# Patient Record
Sex: FEMALE | Race: OTHER | NOT HISPANIC OR LATINO | Employment: FULL TIME | URBAN - METROPOLITAN AREA
[De-identification: names, ages, dates, MRNs, and addresses within clinical notes are randomized per-mention and may not be internally consistent; named-entity substitution may affect disease eponyms.]

---

## 2017-01-23 ENCOUNTER — APPOINTMENT (OUTPATIENT)
Dept: RADIOLOGY | Facility: MEDICAL CENTER | Age: 57
End: 2017-01-23
Attending: SPECIALIST
Payer: COMMERCIAL

## 2017-02-09 ENCOUNTER — HOSPITAL ENCOUNTER (OUTPATIENT)
Dept: RADIOLOGY | Facility: MEDICAL CENTER | Age: 57
End: 2017-02-09
Attending: SPECIALIST
Payer: COMMERCIAL

## 2017-02-09 DIAGNOSIS — D25.9 UTERINE LEIOMYOMA, UNSPECIFIED LOCATION: ICD-10-CM

## 2017-02-09 PROCEDURE — 76830 TRANSVAGINAL US NON-OB: CPT

## 2017-04-05 ENCOUNTER — HOSPITAL ENCOUNTER (OUTPATIENT)
Dept: LAB | Facility: MEDICAL CENTER | Age: 57
End: 2017-04-05
Attending: FAMILY MEDICINE
Payer: COMMERCIAL

## 2017-04-05 DIAGNOSIS — E55.9 VITAMIN D INSUFFICIENCY: ICD-10-CM

## 2017-04-05 DIAGNOSIS — E03.9 HYPOTHYROIDISM, UNSPECIFIED TYPE: ICD-10-CM

## 2017-04-05 DIAGNOSIS — E11.8 TYPE 2 DIABETES MELLITUS WITH COMPLICATION, UNSPECIFIED LONG TERM INSULIN USE STATUS: ICD-10-CM

## 2017-04-05 DIAGNOSIS — E78.5 DYSLIPIDEMIA: ICD-10-CM

## 2017-04-05 LAB
25(OH)D3 SERPL-MCNC: 40 NG/ML (ref 30–100)
ALBUMIN SERPL BCP-MCNC: 4.1 G/DL (ref 3.2–4.9)
ALBUMIN/GLOB SERPL: 1.4 G/DL
ALP SERPL-CCNC: 60 U/L (ref 30–99)
ALT SERPL-CCNC: 23 U/L (ref 2–50)
ANION GAP SERPL CALC-SCNC: 6 MMOL/L (ref 0–11.9)
AST SERPL-CCNC: 20 U/L (ref 12–45)
BASOPHILS # BLD AUTO: 0.5 % (ref 0–1.8)
BASOPHILS # BLD: 0.03 K/UL (ref 0–0.12)
BILIRUB SERPL-MCNC: 0.5 MG/DL (ref 0.1–1.5)
BUN SERPL-MCNC: 12 MG/DL (ref 8–22)
CALCIUM SERPL-MCNC: 9.1 MG/DL (ref 8.5–10.5)
CHLORIDE SERPL-SCNC: 101 MMOL/L (ref 96–112)
CHOLEST SERPL-MCNC: 145 MG/DL (ref 100–199)
CO2 SERPL-SCNC: 27 MMOL/L (ref 20–33)
CREAT SERPL-MCNC: 0.57 MG/DL (ref 0.5–1.4)
EOSINOPHIL # BLD AUTO: 0.12 K/UL (ref 0–0.51)
EOSINOPHIL NFR BLD: 2.1 % (ref 0–6.9)
ERYTHROCYTE [DISTWIDTH] IN BLOOD BY AUTOMATED COUNT: 39.2 FL (ref 35.9–50)
EST. AVERAGE GLUCOSE BLD GHB EST-MCNC: 154 MG/DL
GFR SERPL CREATININE-BSD FRML MDRD: >60 ML/MIN/1.73 M 2
GLOBULIN SER CALC-MCNC: 3 G/DL (ref 1.9–3.5)
GLUCOSE SERPL-MCNC: 136 MG/DL (ref 65–99)
HBA1C MFR BLD: 7 % (ref 0–5.6)
HCT VFR BLD AUTO: 37.9 % (ref 37–47)
HDLC SERPL-MCNC: 38 MG/DL
HGB BLD-MCNC: 12.6 G/DL (ref 12–16)
IMM GRANULOCYTES # BLD AUTO: 0.01 K/UL (ref 0–0.11)
IMM GRANULOCYTES NFR BLD AUTO: 0.2 % (ref 0–0.9)
LDLC SERPL CALC-MCNC: 74 MG/DL
LYMPHOCYTES # BLD AUTO: 2.36 K/UL (ref 1–4.8)
LYMPHOCYTES NFR BLD: 40.5 % (ref 22–41)
MCH RBC QN AUTO: 28.4 PG (ref 27–33)
MCHC RBC AUTO-ENTMCNC: 33.2 G/DL (ref 33.6–35)
MCV RBC AUTO: 85.6 FL (ref 81.4–97.8)
MONOCYTES # BLD AUTO: 0.36 K/UL (ref 0–0.85)
MONOCYTES NFR BLD AUTO: 6.2 % (ref 0–13.4)
NEUTROPHILS # BLD AUTO: 2.94 K/UL (ref 2–7.15)
NEUTROPHILS NFR BLD: 50.5 % (ref 44–72)
NRBC # BLD AUTO: 0 K/UL
NRBC BLD AUTO-RTO: 0 /100 WBC
PLATELET # BLD AUTO: 331 K/UL (ref 164–446)
PMV BLD AUTO: 9.9 FL (ref 9–12.9)
POTASSIUM SERPL-SCNC: 4.1 MMOL/L (ref 3.6–5.5)
PROT SERPL-MCNC: 7.1 G/DL (ref 6–8.2)
RBC # BLD AUTO: 4.43 M/UL (ref 4.2–5.4)
SODIUM SERPL-SCNC: 134 MMOL/L (ref 135–145)
T4 FREE SERPL-MCNC: 1.29 NG/DL (ref 0.53–1.43)
TRIGL SERPL-MCNC: 164 MG/DL (ref 0–149)
TSH SERPL DL<=0.005 MIU/L-ACNC: 3.37 UIU/ML (ref 0.3–3.7)
WBC # BLD AUTO: 5.8 K/UL (ref 4.8–10.8)

## 2017-04-05 PROCEDURE — 82570 ASSAY OF URINE CREATININE: CPT

## 2017-04-05 PROCEDURE — 82043 UR ALBUMIN QUANTITATIVE: CPT

## 2017-04-05 PROCEDURE — 83036 HEMOGLOBIN GLYCOSYLATED A1C: CPT

## 2017-04-05 PROCEDURE — 84443 ASSAY THYROID STIM HORMONE: CPT

## 2017-04-05 PROCEDURE — 36415 COLL VENOUS BLD VENIPUNCTURE: CPT

## 2017-04-05 PROCEDURE — 84439 ASSAY OF FREE THYROXINE: CPT

## 2017-04-05 PROCEDURE — 80061 LIPID PANEL: CPT

## 2017-04-05 PROCEDURE — 82306 VITAMIN D 25 HYDROXY: CPT

## 2017-04-05 PROCEDURE — 80053 COMPREHEN METABOLIC PANEL: CPT

## 2017-04-05 PROCEDURE — 85025 COMPLETE CBC W/AUTO DIFF WBC: CPT

## 2017-04-06 LAB
CREAT UR-MCNC: 27.1 MG/DL
MICROALBUMIN UR-MCNC: <0.7 MG/DL
MICROALBUMIN/CREAT UR: NORMAL MG/G (ref 0–30)

## 2017-04-07 ENCOUNTER — OFFICE VISIT (OUTPATIENT)
Dept: MEDICAL GROUP | Age: 57
End: 2017-04-07
Payer: COMMERCIAL

## 2017-04-07 VITALS
DIASTOLIC BLOOD PRESSURE: 72 MMHG | SYSTOLIC BLOOD PRESSURE: 134 MMHG | OXYGEN SATURATION: 97 % | WEIGHT: 139 LBS | HEART RATE: 83 BPM | BODY MASS INDEX: 25.58 KG/M2 | HEIGHT: 62 IN | TEMPERATURE: 97.1 F

## 2017-04-07 DIAGNOSIS — Z23 NEED FOR HEPATITIS B VACCINATION: ICD-10-CM

## 2017-04-07 DIAGNOSIS — E78.5 DYSLIPIDEMIA: ICD-10-CM

## 2017-04-07 DIAGNOSIS — E11.9 TYPE 2 DIABETES MELLITUS WITHOUT COMPLICATION, WITHOUT LONG-TERM CURRENT USE OF INSULIN (HCC): ICD-10-CM

## 2017-04-07 DIAGNOSIS — E78.1 HYPERTRIGLYCERIDEMIA: ICD-10-CM

## 2017-04-07 DIAGNOSIS — E03.9 HYPOTHYROIDISM, UNSPECIFIED TYPE: ICD-10-CM

## 2017-04-07 DIAGNOSIS — R20.2 TINGLING IN EXTREMITIES: ICD-10-CM

## 2017-04-07 DIAGNOSIS — R21 FACIAL RASH: ICD-10-CM

## 2017-04-07 PROCEDURE — 99214 OFFICE O/P EST MOD 30 MIN: CPT | Mod: 25 | Performed by: FAMILY MEDICINE

## 2017-04-07 PROCEDURE — 90746 HEPB VACCINE 3 DOSE ADULT IM: CPT | Performed by: FAMILY MEDICINE

## 2017-04-07 PROCEDURE — 90471 IMMUNIZATION ADMIN: CPT | Performed by: FAMILY MEDICINE

## 2017-04-07 RX ORDER — LANCETS 30 GAUGE
EACH MISCELLANEOUS
Qty: 100 EACH | Refills: 3 | Status: SHIPPED
Start: 2017-04-07 | End: 2017-06-14 | Stop reason: SDUPTHER

## 2017-04-07 ASSESSMENT — PAIN SCALES - GENERAL: PAINLEVEL: NO PAIN

## 2017-04-07 ASSESSMENT — PATIENT HEALTH QUESTIONNAIRE - PHQ9: CLINICAL INTERPRETATION OF PHQ2 SCORE: 0

## 2017-04-07 NOTE — MR AVS SNAPSHOT
"        Shona Sharpl   2017 10:40 AM   Office Visit   MRN: [id number]    Department:  [address] [place] [address]   Dept Phone:  [phone]    Description:  Female : 1960   Provider:  Laisha Tovar M.D.           Reason for Visit     Diabetes DM follow up/Lab review      Allergies as of 2017     Allergen Noted Reactions    Pcn [Penicillins] 2010         You were diagnosed with     Type 2 diabetes mellitus without complication, without long-term current use of insulin (CMS-HCC)   [3081744]       Hypothyroidism, unspecified type   [7024166]       Dyslipidemia   [813239]       Need for hepatitis B vaccination   [347336]         Vital Signs     Blood Pressure Pulse Temperature Height Weight Body Mass Index    134/72 mmHg 83 36.2 °C (97.1 °F) 1.568 m (5' 1.75\") 63.05 kg (139 lb) 25.64 kg/m2    Oxygen Saturation Smoking Status                97% Never Smoker           Basic Information     Date Of Birth Sex Race Ethnicity Preferred Language    1960 Female Other Non- English      Your appointments     2017  4:20 PM   Established Patient with Laisha Tovar M.D.   [address] [address] [phone]           You will be receiving a confirmation call a few days before your appointment from our automated call confirmation system.              Problem List              ICD-10-CM Priority Class Noted - Resolved    Fibroid uterus D25.9   2010 - Present    Iron defic anemia NEC    2010 - Present    Hypothyroidism E03.9   2010 - Present    Abdominal pain, chronic, right lower quadrant R10.31, G89.29   9/15/2011 - Present    GERD (gastroesophageal reflux disease) K21.9   2012 - Present    Suprapubic discomfort R10.2   2012 - Present    Dyslipidemia E78.5   2013 - Present    Diabetes mellitus, type 2 (CMS-HCC) E11.9   2014 - Present    Postmenopausal Z78.0   2014 - Present   " Vitamin D insufficiency E55.9   12/29/2015 - Present    Hypertriglyceridemia E78.1   6/16/2016 - Present    Visit for screening mammogram Z12.31   10/10/2016 - Present    Cervical cancer screening Z12.4   10/10/2016 - Present      Health Maintenance        Date Due Completion Dates    IMM HEP B VACCINE (1 of 3 - Primary Series) 1960 ---    RETINAL SCREENING 8/9/1977 ---    DIABETES MONOFILAMENT / LE EXAM 6/10/2016 6/10/2015, 6/10/2015 (Done), 6/23/2014 (Done)    Override on 6/10/2015: Done    Override on 6/23/2014: Done    PAP SMEAR 6/23/2017 6/23/2014, 7/20/2012, 9/17/2010    A1C SCREENING 10/5/2017 4/5/2017, 9/26/2016, 6/10/2016, 12/11/2015, 6/8/2015, 12/3/2014, 4/22/2014, 8/23/2013, 5/9/2013, 6/8/2012, 9/2/2011, 4/13/2011    MAMMOGRAM 10/20/2017 10/20/2016, 7/24/2014, 5/17/2013, 5/13/2013, 5/13/2013, 9/7/2011 (Done), 8/19/2009, 8/19/2009, 6/16/2008, 6/16/2008, 4/19/2007    Override on 9/7/2011: Done (per patient)    FASTING LIPID PROFILE 4/5/2018 4/5/2017, 9/26/2016, 6/10/2016, 12/11/2015, 6/8/2015, 12/3/2014, 4/22/2014, 8/23/2013, 5/9/2013, 6/8/2012, 9/2/2011, 9/23/2010    URINE ACR / MICROALBUMIN 4/5/2018 4/5/2017, 6/10/2016, 6/8/2015, 4/22/2014, 5/9/2013, 9/2/2011    SERUM CREATININE 4/5/2018 4/5/2017, 9/26/2016, 6/10/2016, 12/11/2015, 6/8/2015, 12/3/2014, 4/22/2014, 8/23/2013, 5/9/2013, 6/8/2012, 9/2/2011, 9/23/2010    COLONOSCOPY 4/23/2020 4/23/2010    IMM DTaP/Tdap/Td Vaccine (2 - Td) 6/23/2024 6/23/2014            Current Immunizations     13-VALENT PCV PREVNAR 6/10/2015    Hepatitis B Vaccine Recombivax (Adol/Adult)  Incomplete    Influenza TIV (IM) 9/30/2015    Influenza Vaccine Quad Inj (Pf) 9/15/2016  9:48 AM    Influenza Vaccine Quad Inj (Preserved) 11/11/2014    Pneumococcal polysaccharide vaccine (PPSV-23) 8/28/2013    Tdap Vaccine 6/23/2014      Below and/or attached are the medications your provider expects you to take. Review all of your home medications and newly ordered medications with  your provider and/or pharmacist. Follow medication instructions as directed by your provider and/or pharmacist. Please keep your medication list with you and share with your provider. Update the information when medications are discontinued, doses are changed, or new medications (including over-the-counter products) are added; and carry medication information at all times in the event of emergency situations     Allergies:  PCN - (reactions not documented)               Medications  Valid as of: April 07, 2017 - 11:32 AM    Generic Name Brand Name Tablet Size Instructions for use    Calcium-Vitamin D   Take 1 Tab by mouth every 48 hours as needed.        Cholecalciferol (Tab) cholecalciferol 1000 UNIT Take 2,000 Units by mouth every day.        Diclofenac Sodium (Gel) Diclofenac Sodium 1 % Apply 4 g to affected area(s) 3 times a day as needed.        Glucose Blood (Strip) glucose blood  100 Strips by Other route every day.        Lancets (Misc) Lancets  Lancets order: Lancets for Accucheck Compact meter. Sig: use daily and prn ssx high or low sugar. #100 RF x 3        Levothyroxine Sodium (Tab) SYNTHROID 75 MCG TAKE 1 TABLET BY MOUTH EVERY DAY        MetFORMIN HCl (TABLET SR 24 HR) GLUCOPHAGE  MG Take 1 Tab by mouth every day.        Multiple Vitamins-Minerals   Take 1 Tab by mouth every day.        Omega-3 Fatty Acids   Take  by mouth.        Triamcinolone Acetonide (Ointment) KENALOG 0.1 % Apply 1 Application to affected area(s) 2 times a day.        .                 Medicines prescribed today were sent to:     [facility][id number] - [place] - [address]    [address] [address]    Phone: [phone] Fax: [phone]    Open 24 Hours?: No      Medication refill instructions:       If your prescription bottle indicates you have medication refills left, it is not necessary to call your provider’s office. Please contact your pharmacy and they will refill your medication.    If your  prescription bottle indicates you do not have any refills left, you may request refills at any time through one of the following ways: The online [employer] system (except Urgent Care), by calling your provider’s office, or by asking your pharmacy to contact your provider’s office with a refill request. Medication refills are processed only during regular business hours and may not be available until the next business day. Your provider may request additional information or to have a follow-up visit with you prior to refilling your medication.   *Please Note: Medication refills are assigned a new Rx number when refilled electronically. Your pharmacy may indicate that no refills were authorized even though a new prescription for the same medication is available at the pharmacy. Please request the medicine by name with the pharmacy before contacting your provider for a refill.        Your To Do List     Future Labs/Procedures Complete By Expires    COMP METABOLIC PANEL  As directed 10/5/2017    HEMOGLOBIN A1C  As directed 4/7/2018    LIPID PROFILE  As directed 10/5/2017         [employer] Access Code: Activation code not generated  Current [employer] Status: Active

## 2017-04-07 NOTE — Clinical Note
[facility]  Laisha Tovar M.D.  [address]  [place] [address]  Fax: [phone]   Authorization for Release/Disclosure of   Protected Health Information   Name: ADELFO KAUFMAN : 1960 SSN: [id number]   Address: [address] [place] [address]   [place] [address] Phone:    [phone] (home)    I authorize the entity listed below to release/disclose the PHI below to:   [facility]/Laisha Tovar M.D. and Laisha Tovar M.D.   Provider or Entity Name:     Address   City, State, Zip   Phone:      Fax:     Reason for request: continuity of care   Information to be released:    [  ] LAST COLONOSCOPY,  including any PATH REPORT and follow-up  [  ] LAST FIT/COLOGUARD RESULT [  ] LAST DEXA  [  ] LAST MAMMOGRAM  [  ] LAST PAP  [  ] LAST LABS [  ] RETINA EXAM REPORT  [  ] IMMUNIZATION RECORDS  [  ] Release all info      [  ] Check here and initial the line next to each item to release ALL health information INCLUDING  _____ Care and treatment for drug and / or alcohol abuse  _____ HIV testing, infection status, or AIDS  _____ Genetic Testing    DATES OF SERVICE OR TIME PERIOD TO BE DISCLOSED: _____________  I understand and acknowledge that:  * This Authorization may be revoked at any time by you in writing, except if your health information has already been used or disclosed.  * Your health information that will be used or disclosed as a result of you signing this authorization could be re-disclosed by the recipient. If this occurs, your re-disclosed health information may no longer be protected by State or Federal laws.  * You may refuse to sign this Authorization. Your refusal will not affect your ability to obtain treatment.  * This Authorization becomes effective upon signing and will  on (date) __________.      If no date is indicated, this Authorization will  one (1) year from the signature date.    Name: Adelfo Kaufman    Signature:   Date:     2017       PLEASE FAX  REQUESTED RECORDS BACK TO: [phone]

## 2017-04-07 NOTE — PROGRESS NOTES
SUBJECTIVE:      Chief Complaint   Patient presents with   • Diabetes     DM follow up/Lab review       HPI:     Shona Garcia is a 56 y.o. female here for follow up of diabetes and labs.     Diabetes- not on medication therapy currently. Last visit started metformin- tried for 2 weeks, felt she had some breathing issues on medication, thus stopped taking medication and symptoms improved. She is taking a bitter melon supplement.   In the past she was on lisinopril 2.5 mg for two weeks but had dry cough.     Dyslipidemia/Hypertriglyceridemia- takes fish oil on occasion. Otherwise not on medication therapy, has been diet controlled.     Occasionally tingling in finger tips and toes, but is not persistent and seems to pass. Uncertain if she is feeling anxious when symptoms occur.     Has noticed a rash in the middle of her forehead near her eyebrow for past 2-3 years. States it tends to be better with cold water. Not dry, itchy or flaky.   States he friend has lupus and she was concerned. No bleeding or scabbing.     Hypothyroidism- labs stable on levothyroxine 75 mcg daily. Has tolerated well.       Diabetes Health Maintenance  On aspirin: has stomach issues on asa.    Foot exam: Due  Eye exam: done about 6 months ago  Vaccinations:    Influenza: 2015; Pneumonia: PPSV 23- 2013; Td/Adacel: 2014  Educated about routine foot exam: recommend routine check       Ref. Range 4/5/2017 07:58   WBC Latest Ref Range: 4.8-10.8 K/uL 5.8   RBC Latest Ref Range: 4.20-5.40 M/uL 4.43   Hemoglobin Latest Ref Range: 12.0-16.0 g/dL 12.6   Hematocrit Latest Ref Range: 37.0-47.0 % 37.9   MCV Latest Ref Range: 81.4-97.8 fL 85.6   MCH Latest Ref Range: 27.0-33.0 pg 28.4   MCHC Latest Ref Range: 33.6-35.0 g/dL 33.2 (L)   RDW Latest Ref Range: 35.9-50.0 fL 39.2   Platelet Count Latest Ref Range: 164-446 K/uL 331   MPV Latest Ref Range: 9.0-12.9 fL 9.9   Neutrophils-Polys Latest Ref Range: 44.00-72.00 % 50.50   Neutrophils  (Absolute) Latest Ref Range: 2.00-7.15 K/uL 2.94   Lymphocytes Latest Ref Range: 22.00-41.00 % 40.50   Lymphs (Absolute) Latest Ref Range: 1.00-4.80 K/uL 2.36   Monocytes Latest Ref Range: 0.00-13.40 % 6.20   Monos (Absolute) Latest Ref Range: 0.00-0.85 K/uL 0.36   Eosinophils Latest Ref Range: 0.00-6.90 % 2.10   Eos (Absolute) Latest Ref Range: 0.00-0.51 K/uL 0.12   Basophils Latest Ref Range: 0.00-1.80 % 0.50   Baso (Absolute) Latest Ref Range: 0.00-0.12 K/uL 0.03   Immature Granulocytes Latest Ref Range: 0.00-0.90 % 0.20   Immature Granulocytes (abs) Latest Ref Range: 0.00-0.11 K/uL 0.01   Nucleated RBC Latest Units: /100 WBC 0.00   NRBC (Absolute) Latest Units: K/uL 0.00   Sodium Latest Ref Range: 135-145 mmol/L 134 (L)   Potassium Latest Ref Range: 3.6-5.5 mmol/L 4.1   Chloride Latest Ref Range:  mmol/L 101   Co2 Latest Ref Range: 20-33 mmol/L 27   Anion Gap Latest Ref Range: 0.0-11.9  6.0   Glucose Latest Ref Range: 65-99 mg/dL 136 (H)   Bun Latest Ref Range: 8-22 mg/dL 12   Creatinine Latest Ref Range: 0.50-1.40 mg/dL 0.57   GFR If  Latest Ref Range: >60 mL/min/1.73 m 2 >60   GFR If Non  Latest Ref Range: >60 mL/min/1.73 m 2 >60   Calcium Latest Ref Range: 8.5-10.5 mg/dL 9.1   AST(SGOT) Latest Ref Range: 12-45 U/L 20   ALT(SGPT) Latest Ref Range: 2-50 U/L 23   Alkaline Phosphatase Latest Ref Range: 30-99 U/L 60   Total Bilirubin Latest Ref Range: 0.1-1.5 mg/dL 0.5   Albumin Latest Ref Range: 3.2-4.9 g/dL 4.1   Total Protein Latest Ref Range: 6.0-8.2 g/dL 7.1   Globulin Latest Ref Range: 1.9-3.5 g/dL 3.0   A-G Ratio Latest Units: g/dL 1.4   Glycohemoglobin Latest Ref Range: 0.0-5.6 % 7.0 (H)   Estim. Avg Glu Latest Units: mg/dL 154   Cholesterol,Tot Latest Ref Range: 100-199 mg/dL 145   Triglycerides Latest Ref Range: 0-149 mg/dL 164 (H)   HDL Latest Ref Range: >=40 mg/dL 38 (A)   LDL Latest Ref Range: <100 mg/dL 74   Micro Alb Creat Ratio Latest Ref Range: 0-30 mg/g see  "below   Creatinine, Urine Latest Units: mg/dL 27.10   Microalbumin, Urine Random Latest Units: mg/dL <0.7   25-Hydroxy   Vitamin D 25 Latest Ref Range:  ng/mL 40   TSH Latest Ref Range: 0.300-3.700 uIU/mL 3.370   Free T-4 Latest Ref Range: 0.53-1.43 ng/dL 1.29       ROS:  Denies any recent fevers or chills. No nausea or vomiting. No diarrhea. No chest pains or shortness of breath. No lower extremity edema.    Current Outpatient Prescriptions on File Prior to Visit   Medication Sig Dispense Refill   • levothyroxine (SYNTHROID) 75 MCG Tab TAKE 1 TABLET BY MOUTH EVERY DAY 30 Tab 3   • vitamin D (CHOLECALCIFEROL) 1000 UNIT TABS Take 2,000 Units by mouth every day.     • Diclofenac Sodium 1 % GEL Apply 4 g to affected area(s) 3 times a day as needed. 1 Tube 2   • triamcinolone acetonide (KENALOG) 0.1 % OINT Apply 1 Application to affected area(s) 2 times a day. 1 Tube 1   • CALCIUM-VITAMIN D PO Take 1 Tab by mouth every 48 hours as needed.     • Multiple Vitamins-Minerals (WOMENS MULTI PO) Take 1 Tab by mouth every day.     • metformin ER (GLUCOPHAGE XR) 500 MG TABLET SR 24 HR Take 1 Tab by mouth every day. 90 Tab 0     No current facility-administered medications on file prior to visit.       Allergies   Allergen Reactions   • Pcn [Penicillins]        Past Medical History   Diagnosis Date   • Uterine fibroid 1986   • Headache(784.0)    • Anemia    • Blood transfusion 1986     Brother's blood   • Allergy    • Measles    • Varicella    • Heart murmur    • GERD (gastroesophageal reflux disease)      Off and on with certain foods.   • Thyroid disease    • Pyelonephritis 2002   • Diabetes mellitus type II 4/18/2011   • Dyslipidemia 8/28/2013   • Uterine fibroid      has seen gynecology   • S/P colonoscopy 2010     negative-repeat 10 years       OBJECTIVE:   /72 mmHg  Pulse 83  Temp(Src) 36.2 °C (97.1 °F)  Ht 1.568 m (5' 1.75\")  Wt 63.05 kg (139 lb)  BMI 25.64 kg/m2  SpO2 97%  General: Well-developed " well-nourished female, no acute distress  HEENT: mid forehead and between eyebrows with large slightly thickened/raised area, erythematous patch.   Neck: supple, no lymphadenopathy- cervical or supraclavicular, no thyromegaly  Cardiovascular: regular rate and rhythm, no murmurs, gallops, rubs  Lungs: clear to auscultation bilaterally, no wheezes, crackles, or rhonchi  Abdomen: +bowel sounds, soft, nontender, nondistended, no rebound, no guarding, no hepatosplenomegaly  Extremities: no cyanosis, clubbing, edema  Skin: Warm and dry  Psych: appropriate mood and affect  Diabetic foot exam: 2+ pedal pulses, no lesions noted, sensation intact with 10 out of 10 monofilament test.      ASSESSMENT/PLAN:    56 year old female.     1. Type 2 diabetes mellitus without complication, without long-term current use of insulin (HCC)- slightly increased hga1c compared to prior.   -Reviewed recommendations. Recommend diabetic diet, routine exercise. Discussed consideration of new medication therapy, did not tolerate metformin. She will continue supplement therapy. Monitor labs, repeat in 3 months.   glucose blood (ACCU-CHEK COMPACT PLUS) strip    COMP METABOLIC PANEL    HEMOGLOBIN A1C    Hep B Adult 20+    Diabetic Monofilament Lower Extremity Exam   2. Dyslipidemia- diet controlled. Stable. Monitor.   COMP METABOLIC PANEL    LIPID PROFILE   3. Hypertriglyceridemia- stable. Discussed dietary changes, continue omega 3 supplement.    4. Tingling in extremities- intermittent. Unclear if possibly associated with anxiety or start of peripheral neuropathy. No significant findings on foot exam today. Monitor at this time.      5. Facial rash -unclear etiology. Slightly hive-like appearance. Appears benign at this time.   -Recommend moisturize adequately. Continue to monitor. Consider dermatology referral as needed in future.     6. Hypothyroidism, unspecified type - stable, continue current medications.      7. Need for hepatitis B  vaccination  Hep B vaccine       Return in about 3 months (around 7/7/2017).

## 2017-04-17 RX ORDER — LEVOTHYROXINE SODIUM 0.07 MG/1
TABLET ORAL
Refills: 3 | OUTPATIENT
Start: 2017-04-17

## 2017-04-20 ENCOUNTER — PATIENT MESSAGE (OUTPATIENT)
Dept: MEDICAL GROUP | Age: 57
End: 2017-04-20

## 2017-04-20 NOTE — TELEPHONE ENCOUNTER
Prescription Question   4/20/2017 9:17 AM Reply   To: [address]     From: Shona Garcia     Created: 4/20/2017 9:17 AM       *-*-*This message has not been handled.*-*-*     Good Morning Dr. Tovar!    Please send a prescription order to [facility] at the [facility], for my medicine LEVOTHYROXINE 75 MCG when you have a chance. Pharmacy said refill requires authorization. I appreciate your help a lot!    Thank you,   Shona

## 2017-04-21 RX ORDER — LEVOTHYROXINE SODIUM 0.07 MG/1
TABLET ORAL
Qty: 30 TAB | Refills: 3 | Status: SHIPPED | OUTPATIENT
Start: 2017-04-21 | End: 2017-08-11 | Stop reason: SDUPTHER

## 2017-05-30 ENCOUNTER — TELEPHONE (OUTPATIENT)
Dept: MEDICAL GROUP | Age: 57
End: 2017-05-30

## 2017-05-30 NOTE — TELEPHONE ENCOUNTER
1. Caller Name:   [id number] - [place] - [address]  [address] [address]  Phone: [phone] Fax: [phone]        Pharmacy would like to verify how many times a day the pt is testing.

## 2017-05-31 NOTE — TELEPHONE ENCOUNTER
Phone Number Called:   [id number] - [place], [place] - [address]  [address]  [place] [address]  Phone: [phone] Fax: [phone]    Message: Pharmacy notified.    Left Message for patient to call back: no

## 2017-06-14 DIAGNOSIS — E11.9 TYPE 2 DIABETES MELLITUS WITHOUT COMPLICATION, WITHOUT LONG-TERM CURRENT USE OF INSULIN (HCC): ICD-10-CM

## 2017-06-14 RX ORDER — LANCETS 30 GAUGE
EACH MISCELLANEOUS
Qty: 100 EACH | Refills: 3 | Status: SHIPPED | OUTPATIENT
Start: 2017-06-14 | End: 2017-07-13 | Stop reason: SDUPTHER

## 2017-06-14 NOTE — TELEPHONE ENCOUNTER
----- Message from Your Healthcare Team sent at 6/14/2017  3:56 PM PDT -----  Regarding: Prescription Question  Contact: [phone]  Dear Dr. Tovar,    The prescription you ordered for my diabetic supplies is not approved by my insurance.    Per the [facility] provider [facility] need a new prescription & authorization direct from my doctor.   I do my blood test 2 to 3 times daily so I need a 'One Touch' machine, 90 strips, lancets for one month. They said they can do mail order for 90days if the doctor authorizes.      Can you please email the above supplies prescription direct to ‘[facility]?       Thank you  Luz

## 2017-07-07 ENCOUNTER — HOSPITAL ENCOUNTER (OUTPATIENT)
Dept: LAB | Facility: MEDICAL CENTER | Age: 57
End: 2017-07-07
Attending: FAMILY MEDICINE
Payer: COMMERCIAL

## 2017-07-07 DIAGNOSIS — E78.5 DYSLIPIDEMIA: ICD-10-CM

## 2017-07-07 DIAGNOSIS — E11.9 TYPE 2 DIABETES MELLITUS WITHOUT COMPLICATION, WITHOUT LONG-TERM CURRENT USE OF INSULIN (HCC): ICD-10-CM

## 2017-07-07 LAB
ALBUMIN SERPL BCP-MCNC: 4 G/DL (ref 3.2–4.9)
ALBUMIN/GLOB SERPL: 1.3 G/DL
ALP SERPL-CCNC: 56 U/L (ref 30–99)
ALT SERPL-CCNC: 20 U/L (ref 2–50)
ANION GAP SERPL CALC-SCNC: 8 MMOL/L (ref 0–11.9)
AST SERPL-CCNC: 18 U/L (ref 12–45)
BILIRUB SERPL-MCNC: 0.5 MG/DL (ref 0.1–1.5)
BUN SERPL-MCNC: 10 MG/DL (ref 8–22)
CALCIUM SERPL-MCNC: 9.2 MG/DL (ref 8.5–10.5)
CHLORIDE SERPL-SCNC: 101 MMOL/L (ref 96–112)
CHOLEST SERPL-MCNC: 155 MG/DL (ref 100–199)
CO2 SERPL-SCNC: 25 MMOL/L (ref 20–33)
CREAT SERPL-MCNC: 0.61 MG/DL (ref 0.5–1.4)
EST. AVERAGE GLUCOSE BLD GHB EST-MCNC: 160 MG/DL
GFR SERPL CREATININE-BSD FRML MDRD: >60 ML/MIN/1.73 M 2
GLOBULIN SER CALC-MCNC: 3 G/DL (ref 1.9–3.5)
GLUCOSE SERPL-MCNC: 135 MG/DL (ref 65–99)
HBA1C MFR BLD: 7.2 % (ref 0–5.6)
HDLC SERPL-MCNC: 39 MG/DL
LDLC SERPL CALC-MCNC: 62 MG/DL
POTASSIUM SERPL-SCNC: 4.4 MMOL/L (ref 3.6–5.5)
PROT SERPL-MCNC: 7 G/DL (ref 6–8.2)
SODIUM SERPL-SCNC: 134 MMOL/L (ref 135–145)
TRIGL SERPL-MCNC: 270 MG/DL (ref 0–149)

## 2017-07-07 PROCEDURE — 80053 COMPREHEN METABOLIC PANEL: CPT

## 2017-07-07 PROCEDURE — 36415 COLL VENOUS BLD VENIPUNCTURE: CPT

## 2017-07-07 PROCEDURE — 80061 LIPID PANEL: CPT

## 2017-07-07 PROCEDURE — 83036 HEMOGLOBIN GLYCOSYLATED A1C: CPT

## 2017-07-12 ENCOUNTER — TELEPHONE (OUTPATIENT)
Dept: MEDICAL GROUP | Age: 57
End: 2017-07-12

## 2017-07-12 NOTE — TELEPHONE ENCOUNTER
ESTABLISHED PATIENT PRE-VISIT PLANNING     Note: Patient will not be contacted if there is no indication to call.     1.  Reviewed notes from the last few office visits within the medical group: Yes    2.  If any orders were placed at last visit or intended to be done for this visit (i.e. 6 mos follow-up), do we have Results/Consult Notes?        •  Labs - Labs ordered, completed and results are in chart.       •  Imaging - Imaging was not ordered at last office visit.       •  Referrals - No referrals were ordered at last office visit.    3. Is this appointment scheduled as a Hospital Follow-Up? No    4.  Immunizations were updated in Epic using [employer]?: Epic matches [employer]       •  [employer] Recommendations: FLU, HEPATITIS A , HEPATITIS B, MMR , TD and VARICELLA (Chicken Pox)     5.  Patient is due for the following Health Maintenance Topics:   Health Maintenance Due   Topic Date Due   • RETINAL SCREENING  08/09/1977   • IMM HEP B VACCINE (2 of 3 - Primary Series) 05/05/2017   • PAP SMEAR  06/23/2017           6.  Patient was NOT informed to arrive 15 min prior to their scheduled appointment and bring in their medication bottles.

## 2017-07-13 ENCOUNTER — OFFICE VISIT (OUTPATIENT)
Dept: MEDICAL GROUP | Age: 57
End: 2017-07-13
Payer: COMMERCIAL

## 2017-07-13 VITALS
WEIGHT: 136.8 LBS | BODY MASS INDEX: 25.17 KG/M2 | HEIGHT: 62 IN | HEART RATE: 78 BPM | SYSTOLIC BLOOD PRESSURE: 120 MMHG | DIASTOLIC BLOOD PRESSURE: 86 MMHG | TEMPERATURE: 97.2 F | OXYGEN SATURATION: 98 %

## 2017-07-13 DIAGNOSIS — E78.5 DYSLIPIDEMIA: ICD-10-CM

## 2017-07-13 DIAGNOSIS — E11.9 TYPE 2 DIABETES MELLITUS WITHOUT COMPLICATION, WITHOUT LONG-TERM CURRENT USE OF INSULIN (HCC): ICD-10-CM

## 2017-07-13 DIAGNOSIS — R21 RASH AND NONSPECIFIC SKIN ERUPTION: ICD-10-CM

## 2017-07-13 DIAGNOSIS — Z23 NEED FOR VACCINATION: ICD-10-CM

## 2017-07-13 DIAGNOSIS — R51.9 NONINTRACTABLE EPISODIC HEADACHE, UNSPECIFIED HEADACHE TYPE: ICD-10-CM

## 2017-07-13 DIAGNOSIS — E78.1 HYPERTRIGLYCERIDEMIA: ICD-10-CM

## 2017-07-13 PROCEDURE — 90471 IMMUNIZATION ADMIN: CPT | Performed by: FAMILY MEDICINE

## 2017-07-13 PROCEDURE — 99214 OFFICE O/P EST MOD 30 MIN: CPT | Mod: 25 | Performed by: FAMILY MEDICINE

## 2017-07-13 PROCEDURE — 90746 HEPB VACCINE 3 DOSE ADULT IM: CPT | Performed by: FAMILY MEDICINE

## 2017-07-13 RX ORDER — BLOOD-GLUCOSE METER
EACH MISCELLANEOUS
Refills: 3 | COMMUNITY
Start: 2017-07-03 | End: 2018-07-19

## 2017-07-13 NOTE — MR AVS SNAPSHOT
"Shona Garcia   2017 4:20 PM   Office Visit   MRN: [id number]    Department:  [address] [place] [address]   Dept Phone:  [phone]    Description:  Female : 1960   Provider:  Laisha Tovar M.D.           Reason for Visit     Diabetes Mellitus     Results Blood work done on 17    Headache last 2-3 weeks she has had a headache 2-3 times per week.      Allergies as of 2017     Allergen Noted Reactions    Pcn [Penicillins] 2010         You were diagnosed with     Need for vaccination   [052160]       Rash and nonspecific skin eruption   [499309]       Type 2 diabetes mellitus with complication, unspecified long term insulin use status (CMS-HCC)   [1315224]       Type 2 diabetes mellitus without complication, without long-term current use of insulin (CMS-HCC)   [3551296]         Vital Signs     Blood Pressure Pulse Temperature Height Weight Body Mass Index    120/86 mmHg 78 36.2 °C (97.2 °F) 1.568 m (5' 1.75\") 62.052 kg (136 lb 12.8 oz) 25.24 kg/m2    Oxygen Saturation Smoking Status                98% Never Smoker           Basic Information     Date Of Birth Sex Race Ethnicity Preferred Language    1960 Female Other Non- English      Your appointments     Oct 19, 2017  4:20 PM   Established Patient with Laisha Tovar M.D.   [address] [id number] [phone]           You will be receiving a confirmation call a few days before your appointment from our automated call confirmation system.              Problem List              ICD-10-CM Priority Class Noted - Resolved    Fibroid uterus D25.9   2010 - Present    Iron defic anemia NEC    2010 - Present    Hypothyroidism E03.9   2010 - Present    Abdominal pain, chronic, right lower quadrant R10.31, G89.29   9/15/2011 - Present    GERD (gastroesophageal reflux disease) K21.9   2012 - Present    Suprapubic discomfort R10.2   " 8/16/2012 - Present    Dyslipidemia E78.5   8/28/2013 - Present    Diabetes mellitus, type 2 (CMS-HCC) E11.9   4/21/2014 - Present    Postmenopausal Z78.0   6/23/2014 - Present    Vitamin D insufficiency E55.9   12/29/2015 - Present    Hypertriglyceridemia E78.1   6/16/2016 - Present    Visit for screening mammogram Z12.31   10/10/2016 - Present    Cervical cancer screening Z12.4   10/10/2016 - Present      Health Maintenance        Date Due Completion Dates    RETINAL SCREENING 8/9/1977 ---    IMM HEP B VACCINE (2 of 3 - Primary Series) 5/5/2017 4/7/2017    PAP SMEAR 6/23/2017 6/23/2014, 7/20/2012, 9/17/2010    IMM INFLUENZA (1) 9/1/2017 9/15/2016, 9/30/2015, 11/11/2014    MAMMOGRAM 10/20/2017 10/20/2016, 7/24/2014, 5/17/2013, 9/7/2011 (Done), 8/19/2009, 8/19/2009, 6/16/2008, 6/16/2008, 4/19/2007    Override on 9/7/2011: Done (per patient)    A1C SCREENING 1/7/2018 7/7/2017, 4/5/2017, 9/26/2016, 6/10/2016, 12/11/2015, 6/8/2015, 12/3/2014, 4/22/2014, 8/23/2013, 5/9/2013, 6/8/2012, 9/2/2011, 4/13/2011    URINE ACR / MICROALBUMIN 4/5/2018 4/5/2017, 6/10/2016, 6/8/2015, 4/22/2014, 5/9/2013, 9/2/2011    DIABETES MONOFILAMENT / LE EXAM 4/8/2018 4/8/2017, 6/10/2015, 6/10/2015 (Done), 6/23/2014 (Done)    Override on 6/10/2015: Done    Override on 6/23/2014: Done    FASTING LIPID PROFILE 7/7/2018 7/7/2017, 4/5/2017, 9/26/2016, 6/10/2016, 12/11/2015, 6/8/2015, 12/3/2014, 4/22/2014, 8/23/2013, 5/9/2013, 6/8/2012, 9/2/2011, 9/23/2010    SERUM CREATININE 7/7/2018 7/7/2017, 4/5/2017, 9/26/2016, 6/10/2016, 12/11/2015, 6/8/2015, 12/3/2014, 4/22/2014, 8/23/2013, 5/9/2013, 6/8/2012, 9/2/2011, 9/23/2010    COLONOSCOPY 4/23/2020 4/23/2010    IMM DTaP/Tdap/Td Vaccine (2 - Td) 6/23/2024 6/23/2014            Current Immunizations     13-VALENT PCV PREVNAR 6/10/2015    Hepatitis B Vaccine Recombivax (Adol/Adult)  Incomplete, 4/7/2017    Influenza TIV (IM) 9/30/2015    Influenza Vaccine Quad Inj (Pf) 9/15/2016  9:48 AM    Influenza Vaccine  Quad Inj (Preserved) 11/11/2014    Pneumococcal polysaccharide vaccine (PPSV-23) 8/28/2013    Tdap Vaccine 6/23/2014      Below and/or attached are the medications your provider expects you to take. Review all of your home medications and newly ordered medications with your provider and/or pharmacist. Follow medication instructions as directed by your provider and/or pharmacist. Please keep your medication list with you and share with your provider. Update the information when medications are discontinued, doses are changed, or new medications (including over-the-counter products) are added; and carry medication information at all times in the event of emergency situations     Allergies:  PCN - (reactions not documented)               Medications  Valid as of: July 13, 2017 -  5:24 PM    Generic Name Brand Name Tablet Size Instructions for use    Blood Glucose Monitoring Suppl (Misc) Blood Glucose Monitoring Suppl SUPPLIES Sig: use daily and prn ssx high or low sugar #100 RF x 3; 1. The OneTouch Verio Flex® meter- one time; 2. OneTouch Verio® test strips - # 90, RF #3; 3. OneTouch UltraSoft® lancets  -# 90, RF 3        Blood Glucose Monitoring Suppl (Kit) ONETOUCH VERIO W/DEVICE USE TO TEST HIGH OR LOW SUGAR DAILY AS NEEDED        Calcium-Vitamin D   Take 1 Tab by mouth every 48 hours as needed.        Cholecalciferol (Tab) cholecalciferol 1000 UNIT Take 2,000 Units by mouth every day.        Diclofenac Sodium (Gel) Diclofenac Sodium 1 % Apply 4 g to affected area(s) 3 times a day as needed.        Glucose Blood (Strip) glucose blood  100 Strips by Other route every day.        Lancets (Misc) Lancets  Lancets order: Lancets for Accucheck Compact meter. Sig: use daily and prn ssx high or low sugar. #100 RF x 3        Levothyroxine Sodium (Tab) SYNTHROID 75 MCG TAKE 1 TABLET BY MOUTH EVERY DAY        MetFORMIN HCl (TABLET SR 24 HR) GLUCOPHAGE  MG Take 1 Tab by mouth every day.        MetFORMIN HCl (Tab)  GLUCOPHAGE 500 MG Take 1 Tab by mouth every day.        Multiple Vitamins-Minerals   Take 1 Tab by mouth every day.        Omega-3 Fatty Acids   Take  by mouth.        Triamcinolone Acetonide (Ointment) KENALOG 0.1 % Apply 1 Application to affected area(s) 2 times a day.        .                 Medicines prescribed today were sent to:     [facility][id number] - [place], [place] - [address]    [address] [place] [address]    Phone: [phone] Fax: [phone]    Open 24 Hours?: No    [facility] - [place], [place] - [address] [facility] [address] [facility]    [address] [facility] [address]    Phone: [phone] Fax: [phone]    Open 24 Hours?: No      Medication refill instructions:       If your prescription bottle indicates you have medication refills left, it is not necessary to call your provider’s office. Please contact your pharmacy and they will refill your medication.    If your prescription bottle indicates you do not have any refills left, you may request refills at any time through one of the following ways: The online [email or web address] system (except Urgent Care), by calling your provider’s office, or by asking your pharmacy to contact your provider’s office with a refill request. Medication refills are processed only during regular business hours and may not be available until the next business day. Your provider may request additional information or to have a follow-up visit with you prior to refilling your medication.   *Please Note: Medication refills are assigned a new Rx number when refilled electronically. Your pharmacy may indicate that no refills were authorized even though a new prescription for the same medication is available at the pharmacy. Please request the medicine by name with the pharmacy before contacting your provider for a refill.        Your To Do List     Future Labs/Procedures Complete By Expires    ANTI-NUCLEAR ANTIBODY SERUM  As directed 7/13/2018      Referral      A referral request has been sent to our patient care coordination department. Please allow 3-5 business days for us to process this request and contact you either by phone or mail. If you do not hear from us by the 5th business day, please call us at [phone].           [employer] Access Code: Activation code not generated  Current [employer] Status: Active

## 2017-07-13 NOTE — PROGRESS NOTES
SUBJECTIVE:        Chief Complaint   Patient presents with   • Diabetes Mellitus   • Results     Blood work done on 07/07/17   • Headache     last 2-3 weeks she has had a headache 2-3 times per week.       HPI:     Shona Garcia is a 56 y.o. female here for follow up of diabetes and labs.     Diabetes- was eating more fried food at work lately. Slightly increase hgba1c. Previously felt she may have had some breathing issues after starting metformin xr, which seemed to stop after she discontinued. She would like to retry therapy as her brother is on the medication.  She has taken bitter melon supplements.   In the past she was on lisinopril 2.5 mg for two weeks but had dry cough.     Dyslipidemia/Hypertriglyceridemia- takes fish oil on occasion a few times a week. She is otherwise not on medication therapy, has been diet controlled.   She would like to start a probiotic.     Getting some headaches about 2-3 times a week lately. Sometimes on side and sometimes all over. Gets headaches with sun exposure. Similar to before. Sometimes advil helps. Achy. Sometimes sensitive to light.  Stress is worse. Saturdays- more so with headaches, forgets to drink water.    Rash in mid forehead area between eye brow region. She has had the rash for a while- years Would like to have lab work testing for possible lupus. No unusual joint pain symptoms. May have occasional right lower leg numbness if sitting for a while, but that improves. Occasional tingling in toes.     Will see gynecology- has fibroid. Will plan to have pap completed then.       Diabetes Health Maintenance  On aspirin: has stomach issues on asa.    Foot exam: 4/2017  Eye exam: mild cataracts- last week had exam.   Vaccinations:    Influenza: 2015; Pneumonia: PPSV 23- 2013; Td/Adacel: 2014  Educated about routine foot exam: recommend routine check         Ref. Range 7/7/2017 08:13   Sodium Latest Ref Range: 135-145 mmol/L 134 (L)   Potassium Latest Ref  Range: 3.6-5.5 mmol/L 4.4   Chloride Latest Ref Range:  mmol/L 101   Co2 Latest Ref Range: 20-33 mmol/L 25   Anion Gap Latest Ref Range: 0.0-11.9  8.0   Glucose Latest Ref Range: 65-99 mg/dL 135 (H)   Bun Latest Ref Range: 8-22 mg/dL 10   Creatinine Latest Ref Range: 0.50-1.40 mg/dL 0.61   GFR If  Latest Ref Range: >60 mL/min/1.73 m 2 >60   GFR If Non  Latest Ref Range: >60 mL/min/1.73 m 2 >60   Calcium Latest Ref Range: 8.5-10.5 mg/dL 9.2   AST(SGOT) Latest Ref Range: 12-45 U/L 18   ALT(SGPT) Latest Ref Range: 2-50 U/L 20   Alkaline Phosphatase Latest Ref Range: 30-99 U/L 56   Total Bilirubin Latest Ref Range: 0.1-1.5 mg/dL 0.5   Albumin Latest Ref Range: 3.2-4.9 g/dL 4.0   Total Protein Latest Ref Range: 6.0-8.2 g/dL 7.0   Globulin Latest Ref Range: 1.9-3.5 g/dL 3.0   A-G Ratio Latest Units: g/dL 1.3   Glycohemoglobin Latest Ref Range: 0.0-5.6 % 7.2 (H)   Estim. Avg Glu Latest Units: mg/dL 160   Cholesterol,Tot Latest Ref Range: 100-199 mg/dL 155   Triglycerides Latest Ref Range: 0-149 mg/dL 270 (H)   HDL Latest Ref Range: >=40 mg/dL 39 (A)   LDL Latest Ref Range: <100 mg/dL 62       ROS:  Denies any recent fevers or chills. No nausea or vomiting. No diarrhea. No chest pains or shortness of breath. No lower extremity edema.    Current Outpatient Prescriptions on File Prior to Visit   Medication Sig Dispense Refill   • glucose blood (ACCU-CHEK COMPACT PLUS) strip 100 Strips by Other route every day. 100 Strip 3   • levothyroxine (SYNTHROID) 75 MCG Tab TAKE 1 TABLET BY MOUTH EVERY DAY 30 Tab 3   • Omega-3 Fatty Acids (OMEGA 3 PO) Take  by mouth.     • vitamin D (CHOLECALCIFEROL) 1000 UNIT TABS Take 2,000 Units by mouth every day.     • triamcinolone acetonide (KENALOG) 0.1 % OINT Apply 1 Application to affected area(s) 2 times a day. 1 Tube 1   • Multiple Vitamins-Minerals (WOMENS MULTI PO) Take 1 Tab by mouth every day.     • metformin ER (GLUCOPHAGE XR) 500 MG TABLET SR 24 HR  "Take 1 Tab by mouth every day. (Patient not taking: Reported on 7/13/2017) 90 Tab 0   • Diclofenac Sodium 1 % GEL Apply 4 g to affected area(s) 3 times a day as needed. (Patient not taking: Reported on 7/13/2017) 1 Tube 2   • CALCIUM-VITAMIN D PO Take 1 Tab by mouth every 48 hours as needed.       No current facility-administered medications on file prior to visit.       Allergies   Allergen Reactions   • Pcn [Penicillins]        Patient Active Problem List    Diagnosis Date Noted   • Type 2 diabetes mellitus without complication, without long-term current use of insulin ([facility]) 07/14/2017   • Nonintractable episodic headache 07/14/2017   • Visit for screening mammogram 10/10/2016   • Cervical cancer screening 10/10/2016   • Hypertriglyceridemia 06/16/2016   • Vitamin D insufficiency 12/29/2015   • Postmenopausal 06/23/2014   • Diabetes mellitus, type 2 (CMS-HCC) 04/21/2014   • Dyslipidemia 08/28/2013   • Suprapubic discomfort 08/16/2012   • GERD (gastroesophageal reflux disease) 06/05/2012   • Abdominal pain, chronic, right lower quadrant 09/15/2011   • Fibroid uterus 09/14/2010   • Iron defic anemia NEC 09/14/2010   • Hypothyroidism 09/14/2010       Past Medical History   Diagnosis Date   • Uterine fibroid 1986   • Headache    • Anemia    • Blood transfusion 1986     Brother's blood   • Allergy    • Measles    • Varicella    • Heart murmur    • GERD (gastroesophageal reflux disease)      Off and on with certain foods.   • Thyroid disease    • Pyelonephritis 2002   • Diabetes mellitus type II 4/18/2011   • Dyslipidemia 8/28/2013   • Uterine fibroid      has seen gynecology   • S/P colonoscopy 2010     negative-repeat 10 years             OBJECTIVE:   /86 mmHg  Pulse 78  Temp(Src) 36.2 °C (97.2 °F)  Ht 1.568 m (5' 1.75\")  Wt 62.052 kg (136 lb 12.8 oz)  BMI 25.24 kg/m2  SpO2 98%  General: Well-developed well-nourished female, no acute distress  Neck: supple, no lymphadenopathy- cervical or supraclavicular, " no thyromegaly  Cardiovascular: regular rate and rhythm, no murmurs, gallops, rubs  Lungs: clear to auscultation bilaterally, no wheezes, crackles, or rhonchi  Abdomen: +bowel sounds, soft, nontender, nondistended, no rebound, no guarding, no hepatosplenomegaly  Extremities: no cyanosis, clubbing, edema  Skin: Warm and dry. Mid forehead- glabella region with slightly raised patch of erythema   Psych: appropriate mood and affect      ASSESSMENT/PLAN:    56 y.o.female    1. Type 2 diabetes mellitus without complication, without long-term current use of insulin (HCC) - slightly elevated hgba1c. Will give trial of metformin- 500 mg daily. Monitor glucose daily.  metformin (GLUCOPHAGE) 500 MG Tab    Blood Glucose Monitoring Suppl SUPPLIES Misc    Lancets Misc   2. Dyslipidemia - uncontrolled- recommend diet changes, increase fish oils to 2 times a day as tolerated. Monitor. Routine exercise.     3. Hypertriglyceridemia - as above.     4. Nonintractable episodic headache, unspecified headache type- appears multifactorial- stress related and associated with lack of fluid intake.   -Recommend stress modification and adequate fluid intake. OTC medication as needed. Monitor.     5. Rash and nonspecific skin eruption- unclear etiology of rash. Obtain lab. Refer to dermatology otherwise, may consider biopsy of area.   ANTI-NUCLEAR ANTIBODY SERUM    REFERRAL TO DERMATOLOGY   6. Need for vaccination  Hepatitis B Vaccine Adult IM   Patient to obtain pap with gynecology.     Return in about 3 months (around 10/13/2017).    This medical record contains text that has been entered with the assistance of computer voice recognition and dictation software.  Therefore, it may contain unintended errors in text, spelling, punctuation, or grammar.

## 2017-07-14 PROBLEM — R51.9 NONINTRACTABLE EPISODIC HEADACHE: Status: ACTIVE | Noted: 2017-07-14

## 2017-07-14 PROBLEM — E11.9 TYPE 2 DIABETES MELLITUS WITHOUT COMPLICATION, WITHOUT LONG-TERM CURRENT USE OF INSULIN (HCC): Status: ACTIVE | Noted: 2017-07-14

## 2017-07-14 RX ORDER — LANCETS 30 GAUGE
EACH MISCELLANEOUS
Qty: 100 EACH | Refills: 3 | Status: SHIPPED | OUTPATIENT
Start: 2017-07-14 | End: 2017-12-08 | Stop reason: SDUPTHER

## 2017-08-14 RX ORDER — LEVOTHYROXINE SODIUM 0.07 MG/1
TABLET ORAL
Qty: 30 TAB | Refills: 6 | Status: SHIPPED | OUTPATIENT
Start: 2017-08-14 | End: 2018-03-14 | Stop reason: SDUPTHER

## 2017-09-20 ENCOUNTER — APPOINTMENT (OUTPATIENT)
Dept: SOCIAL WORK | Facility: CLINIC | Age: 57
End: 2017-09-20

## 2017-09-20 PROCEDURE — 90686 IIV4 VACC NO PRSV 0.5 ML IM: CPT | Performed by: REGISTERED NURSE

## 2017-10-16 ENCOUNTER — HOSPITAL ENCOUNTER (OUTPATIENT)
Dept: LAB | Facility: MEDICAL CENTER | Age: 57
End: 2017-10-16
Attending: FAMILY MEDICINE
Payer: COMMERCIAL

## 2017-10-16 DIAGNOSIS — E11.8 TYPE 2 DIABETES MELLITUS WITH COMPLICATION, UNSPECIFIED LONG TERM INSULIN USE STATUS: ICD-10-CM

## 2017-10-16 DIAGNOSIS — E78.5 DYSLIPIDEMIA: ICD-10-CM

## 2017-10-16 DIAGNOSIS — R21 RASH AND NONSPECIFIC SKIN ERUPTION: ICD-10-CM

## 2017-10-16 LAB
ALBUMIN SERPL BCP-MCNC: 3.9 G/DL (ref 3.2–4.9)
ALBUMIN/GLOB SERPL: 1.3 G/DL
ALP SERPL-CCNC: 54 U/L (ref 30–99)
ALT SERPL-CCNC: 17 U/L (ref 2–50)
ANION GAP SERPL CALC-SCNC: 7 MMOL/L (ref 0–11.9)
AST SERPL-CCNC: 18 U/L (ref 12–45)
BILIRUB SERPL-MCNC: 0.5 MG/DL (ref 0.1–1.5)
BUN SERPL-MCNC: 8 MG/DL (ref 8–22)
CALCIUM SERPL-MCNC: 9.3 MG/DL (ref 8.5–10.5)
CHLORIDE SERPL-SCNC: 100 MMOL/L (ref 96–112)
CHOLEST SERPL-MCNC: 143 MG/DL (ref 100–199)
CO2 SERPL-SCNC: 25 MMOL/L (ref 20–33)
CREAT SERPL-MCNC: 0.5 MG/DL (ref 0.5–1.4)
EST. AVERAGE GLUCOSE BLD GHB EST-MCNC: 154 MG/DL
GFR SERPL CREATININE-BSD FRML MDRD: >60 ML/MIN/1.73 M 2
GLOBULIN SER CALC-MCNC: 2.9 G/DL (ref 1.9–3.5)
GLUCOSE SERPL-MCNC: 110 MG/DL (ref 65–99)
HBA1C MFR BLD: 7 % (ref 0–5.6)
HDLC SERPL-MCNC: 41 MG/DL
LDLC SERPL CALC-MCNC: 71 MG/DL
POTASSIUM SERPL-SCNC: 4.1 MMOL/L (ref 3.6–5.5)
PROT SERPL-MCNC: 6.8 G/DL (ref 6–8.2)
SODIUM SERPL-SCNC: 132 MMOL/L (ref 135–145)
TRIGL SERPL-MCNC: 154 MG/DL (ref 0–149)

## 2017-10-16 PROCEDURE — 36415 COLL VENOUS BLD VENIPUNCTURE: CPT

## 2017-10-16 PROCEDURE — 83036 HEMOGLOBIN GLYCOSYLATED A1C: CPT

## 2017-10-16 PROCEDURE — 80053 COMPREHEN METABOLIC PANEL: CPT

## 2017-10-16 PROCEDURE — 86038 ANTINUCLEAR ANTIBODIES: CPT

## 2017-10-16 PROCEDURE — 80061 LIPID PANEL: CPT

## 2017-10-17 LAB — NUCLEAR IGG SER QL IA: NORMAL

## 2017-10-18 ENCOUNTER — TELEPHONE (OUTPATIENT)
Dept: MEDICAL GROUP | Age: 57
End: 2017-10-18

## 2017-10-19 ENCOUNTER — OFFICE VISIT (OUTPATIENT)
Dept: MEDICAL GROUP | Age: 57
End: 2017-10-19
Payer: COMMERCIAL

## 2017-10-19 VITALS
DIASTOLIC BLOOD PRESSURE: 90 MMHG | WEIGHT: 132.6 LBS | SYSTOLIC BLOOD PRESSURE: 140 MMHG | OXYGEN SATURATION: 98 % | BODY MASS INDEX: 24.4 KG/M2 | TEMPERATURE: 97.8 F | HEIGHT: 62 IN | HEART RATE: 61 BPM

## 2017-10-19 DIAGNOSIS — E11.8 TYPE 2 DIABETES MELLITUS WITH COMPLICATION, UNSPECIFIED LONG TERM INSULIN USE STATUS: ICD-10-CM

## 2017-10-19 DIAGNOSIS — R03.0 ELEVATED BP WITHOUT DIAGNOSIS OF HYPERTENSION: ICD-10-CM

## 2017-10-19 PROCEDURE — 99214 OFFICE O/P EST MOD 30 MIN: CPT | Performed by: FAMILY MEDICINE

## 2017-10-19 RX ORDER — MECLIZINE HYDROCHLORIDE 25 MG/1
25 TABLET ORAL 3 TIMES DAILY PRN
Qty: 30 TAB | Refills: 0 | Status: SHIPPED | OUTPATIENT
Start: 2017-10-19 | End: 2017-10-26 | Stop reason: SDUPTHER

## 2017-10-19 NOTE — PROGRESS NOTES
SUBJECTIVE:      Chief Complaint   Patient presents with   • Dizziness       HPI:     Shona Garcia is a 57 y.o. female here for symptoms of dizziness. She is here with her  today.   States she was trying to get up was dizzy since this morning. Notices symptoms when she turns her head. Notices significant spinning sensation and feels she may fall over. No headache currently. No eye pain or visual changes otherwise.   No chest pain or shortness of breath.  No nausea/vomiting.   Has had some neck pain, using a lower pillow and is better today.   Sometimes feels her head is numb in the morning.   BP was 155/88s this morning. Last 2 weeks with not enough sleep, has been stressed. Has had about 4 hours of sleep at night. BP has been normal in the past.   Next week has eye doctor appointment- possible glaucoma.   She has diabetes- no significant low glucose levels.       ROS:  Denies any recent fevers or chills. No nausea or vomiting. No diarrhea. No chest pains or shortness of breath. No lower extremity edema.    Current Outpatient Prescriptions on File Prior to Visit   Medication Sig Dispense Refill   • levothyroxine (SYNTHROID) 75 MCG Tab TAKE 1 TABLET BY MOUTH EVERY DAY 30 Tab 6   • Blood Glucose Monitoring Suppl SUPPLIES Misc Sig: use daily and prn ssx high or low sugar two times a day. OneTouch Verio test strips  # 90, RF #3; 1 Each 3   • Lancets Misc OneTouch UltraSoft  lancets # 90, RF 3 Sig: use daily and prn ssx high or low sugar two times a day. #100 RF x 3 100 Each 3   • Blood Glucose Monitoring Suppl (ONETOUCH VERIO) W/DEVICE Kit USE TO TEST HIGH OR LOW SUGAR DAILY AS NEEDED  3   • metformin (GLUCOPHAGE) 500 MG Tab Take 1 Tab by mouth every day. 30 Tab 3   • glucose blood (ACCU-CHEK COMPACT PLUS) strip 100 Strips by Other route every day. 100 Strip 3   • Omega-3 Fatty Acids (OMEGA 3 PO) Take  by mouth.     • vitamin D (CHOLECALCIFEROL) 1000 UNIT TABS Take 2,000 Units by mouth every day.      • triamcinolone acetonide (KENALOG) 0.1 % OINT Apply 1 Application to affected area(s) 2 times a day. 1 Tube 1   • Multiple Vitamins-Minerals (WOMENS MULTI PO) Take 1 Tab by mouth every day.     • metformin ER (GLUCOPHAGE XR) 500 MG TABLET SR 24 HR Take 1 Tab by mouth every day. (Patient not taking: Reported on 7/13/2017) 90 Tab 0   • Diclofenac Sodium 1 % GEL Apply 4 g to affected area(s) 3 times a day as needed. 1 Tube 2   • CALCIUM-VITAMIN D PO Take 1 Tab by mouth every 48 hours as needed.       No current facility-administered medications on file prior to visit.        Allergies   Allergen Reactions   • Pcn [Penicillins]        Patient Active Problem List    Diagnosis Date Noted   • Type 2 diabetes mellitus without complication, without long-term current use of insulin ([facility]) 07/14/2017   • Nonintractable episodic headache 07/14/2017   • Visit for screening mammogram 10/10/2016   • Cervical cancer screening 10/10/2016   • Hypertriglyceridemia 06/16/2016   • Vitamin D insufficiency 12/29/2015   • Postmenopausal 06/23/2014   • Diabetes mellitus, type 2 (CMS-[facility]) 04/21/2014   • Dyslipidemia 08/28/2013   • Suprapubic discomfort 08/16/2012   • GERD (gastroesophageal reflux disease) 06/05/2012   • Abdominal pain, chronic, right lower quadrant 09/15/2011   • Fibroid uterus 09/14/2010   • Iron defic anemia NEC 09/14/2010   • Hypothyroidism 09/14/2010       Past Medical History:   Diagnosis Date   • Allergy    • Anemia    • Blood transfusion 1986    Brother's blood   • Diabetes mellitus type II 4/18/2011   • Dyslipidemia 8/28/2013   • GERD (gastroesophageal reflux disease)     Off and on with certain foods.   • Headache(784.0)    • Heart murmur    • Measles    • Pyelonephritis 2002   • S/P colonoscopy 2010    negative-repeat 10 years   • Thyroid disease    • Uterine fibroid 1986   • Uterine fibroid     has seen gynecology   • Varicella            OBJECTIVE:   /90   Pulse 61   Temp 36.6 °C (97.8 °F)   Ht 1.568  "m (5' 1.75\")   Wt 60.1 kg (132 lb 9.6 oz)   SpO2 98%   BMI 24.45 kg/m²   General: Well-developed well-nourished female, no acute distress  Neck: supple, no lymphadenopathy- cervical or supraclavicular, no thyromegaly  Cardiovascular: regular rate and rhythm, no murmurs, gallops, rubs  Lungs: clear to auscultation bilaterally, no wheezes, crackles, or rhonchi  Abdomen: +bowel sounds, soft, nontender, nondistended, no rebound, no guarding, no hepatosplenomegaly  Extremities: no cyanosis, clubbing, edema  Skin: Warm and dry  Psych: appropriate mood and affect    + to right side Hannacroix Hallpike maneuver is strongly positive.   Epley maneuver done in clinic.      Ref. Range 10/16/2017 08:23   Sodium Latest Ref Range: 135 - 145 mmol/L 132 (L)   Potassium Latest Ref Range: 3.6 - 5.5 mmol/L 4.1   Chloride Latest Ref Range: 96 - 112 mmol/L 100   Co2 Latest Ref Range: 20 - 33 mmol/L 25   Anion Gap Latest Ref Range: 0.0 - 11.9  7.0   Glucose Latest Ref Range: 65 - 99 mg/dL 110 (H)   Bun Latest Ref Range: 8 - 22 mg/dL 8   Creatinine Latest Ref Range: 0.50 - 1.40 mg/dL 0.50   GFR If  Latest Ref Range: >60 mL/min/1.73 m 2 >60   GFR If Non  Latest Ref Range: >60 mL/min/1.73 m 2 >60   Calcium Latest Ref Range: 8.5 - 10.5 mg/dL 9.3   AST(SGOT) Latest Ref Range: 12 - 45 U/L 18   ALT(SGPT) Latest Ref Range: 2 - 50 U/L 17   Alkaline Phosphatase Latest Ref Range: 30 - 99 U/L 54   Total Bilirubin Latest Ref Range: 0.1 - 1.5 mg/dL 0.5   Albumin Latest Ref Range: 3.2 - 4.9 g/dL 3.9   Total Protein Latest Ref Range: 6.0 - 8.2 g/dL 6.8   Globulin Latest Ref Range: 1.9 - 3.5 g/dL 2.9   A-G Ratio Latest Units: g/dL 1.3   Glycohemoglobin Latest Ref Range: 0.0 - 5.6 % 7.0 (H)   Estim. Avg Glu Latest Units: mg/dL 154   Cholesterol,Tot Latest Ref Range: 100 - 199 mg/dL 143   Triglycerides Latest Ref Range: 0 - 149 mg/dL 154 (H)   HDL Latest Ref Range: >=40 mg/dL 41   LDL Latest Ref Range: <100 mg/dL 71   Antinuclear " Antibody Latest Ref Range: None Detected  None Detected     ASSESSMENT/PLAN:    57 y.o.female    1. Positional vertigo of right ear- appears due to BPPV, but symptoms are moderate to severe at this time. Recommend use of meclizine therapy. Fall precautions. Rest.  Handout of home epley exercises given to patient. Monitor.  Meclizine 25 mg   2. Elevated BP without diagnosis of hypertension-mild, likely due to recent vertigo symptoms and discomfort.  Will monitor at this time.     3. Type 2 diabetes mellitus with complication, unspecified long term insulin use status (CMS-[facility]) - stable. Monitor glucose levels routinely.         Return in about 1 week (around 10/26/2017).    This medical record contains text that has been entered with the assistance of computer voice recognition and dictation software.  Therefore, it may contain unintended errors in text, spelling, punctuation, or grammar.      > 25 minutes spent with this patient of which > 15 minutes spent on counseling and coordination of care.

## 2017-10-19 NOTE — TELEPHONE ENCOUNTER
ESTABLISHED PATIENT PRE-VISIT PLANNING     Note: Patient will not be contacted if there is no indication to call.     1.  Reviewed notes from the last few office visits within the medical group: Yes    2.  If any orders were placed at last visit or intended to be done for this visit (i.e. 6 mos follow-up), do we have Results/Consult Notes?        •  Labs - Labs ordered, completed on 10/16 and results are in chart.   Note: If patient appointment is for lab review and patient did not complete labs,                check with provider if OK to reschedule patient until labs completed.       •  Imaging - scheduled       •  Referrals - No referrals were ordered at last office visit.    3. Is this appointment scheduled as a Hospital Follow-Up? No    4.  Immunizations were updated in Epic using WebIZ?: Epic matches WebIZ       •  Web Iz Recommendations: HEPATITIS A , MMR , TD and VARICELLA (Chicken Pox)     5.  Patient is due for the following Health Maintenance Topics:   Health Maintenance Due   Topic Date Due   • PAP SMEAR  06/23/2017   • IMM HEP B VACCINE (3 of 3 - Primary Series) 09/07/2017           6.  Patient was NOT informed to arrive 15 min prior to their scheduled appointment and bring in their medication bottles.

## 2017-10-23 ENCOUNTER — HOSPITAL ENCOUNTER (OUTPATIENT)
Dept: RADIOLOGY | Facility: MEDICAL CENTER | Age: 57
End: 2017-10-23
Attending: FAMILY MEDICINE
Payer: COMMERCIAL

## 2017-10-23 DIAGNOSIS — Z12.39 BREAST CANCER SCREENING: ICD-10-CM

## 2017-10-23 PROCEDURE — G0202 SCR MAMMO BI INCL CAD: HCPCS

## 2017-10-24 ENCOUNTER — TELEPHONE (OUTPATIENT)
Dept: MEDICAL GROUP | Age: 57
End: 2017-10-24

## 2017-10-24 NOTE — LETTER
October 25, 2017        Shona Garcia  [address] [place] [address]        Dear Anathasia:    Dr. Tovar's office has been unable to reach you. Please call our office at [phone]. Thank you.      If you have any questions or concerns, please don't hesitate to call.        Sincerely,        Alphonso Gray Ass't      Electronically Signed

## 2017-10-26 ENCOUNTER — OFFICE VISIT (OUTPATIENT)
Dept: MEDICAL GROUP | Age: 57
End: 2017-10-26
Payer: COMMERCIAL

## 2017-10-26 VITALS
OXYGEN SATURATION: 98 % | BODY MASS INDEX: 25.06 KG/M2 | SYSTOLIC BLOOD PRESSURE: 120 MMHG | WEIGHT: 136.2 LBS | HEIGHT: 62 IN | HEART RATE: 73 BPM | TEMPERATURE: 98 F | DIASTOLIC BLOOD PRESSURE: 78 MMHG

## 2017-10-26 DIAGNOSIS — E11.9 TYPE 2 DIABETES MELLITUS WITHOUT COMPLICATION, WITHOUT LONG-TERM CURRENT USE OF INSULIN (HCC): ICD-10-CM

## 2017-10-26 DIAGNOSIS — R03.0 ELEVATED BP WITHOUT DIAGNOSIS OF HYPERTENSION: ICD-10-CM

## 2017-10-26 PROCEDURE — 99214 OFFICE O/P EST MOD 30 MIN: CPT | Performed by: FAMILY MEDICINE

## 2017-10-26 RX ORDER — LOSARTAN POTASSIUM 25 MG/1
25 TABLET ORAL DAILY
Qty: 30 TAB | Refills: 3 | Status: SHIPPED | OUTPATIENT
Start: 2017-10-26 | End: 2018-01-18

## 2017-10-26 RX ORDER — MECLIZINE HYDROCHLORIDE 25 MG/1
25 TABLET ORAL 3 TIMES DAILY PRN
Qty: 30 TAB | Refills: 0 | Status: SHIPPED | OUTPATIENT
Start: 2017-10-26 | End: 2018-01-18

## 2017-10-26 NOTE — PROGRESS NOTES
Reviewed with patient recent mammogram which is negative but with dense breast tissue. Recommend annual routine screening.  Discussed with patient option of obtaining sonocine ultrasound if desired.

## 2017-10-26 NOTE — PROGRESS NOTES
SUBJECTIVE:        Chief Complaint   Patient presents with   • Vertigo     follow up       HPI:     Shona Garcia is a 57 y.o. female here for follow-up of vertigo. She is here today with her .  States that her symptoms are currently about 50% better. She notices some symptoms when she looks up or down. She has been doing the home Epley exercises about twice a day currently. She has been taking meclizine 3 times a day which she has tolerated well.  After last visit her blood pressure was in the 150s a couple times.  states he did give her losartan a couple times, but since then her blood pressure has improved and has been mostly in the 110s to 120s that has not given her any further medication at this time. She does not have a prior history of hypertension.   is concerned the metformin could be causing her vertigo. She has since discontinued metformin at this time. She was taking bitter melon previously and will plan to restart on the plantar melon supplement.  She is asking about taking biotin supplement gummies.       ROS:  Denies any recent fevers or chills. No nausea or vomiting. No diarrhea. No chest pains or shortness of breath. No lower extremity edema.    Current Outpatient Prescriptions on File Prior to Visit   Medication Sig Dispense Refill   • meclizine (ANTIVERT) 25 MG Tab Take 1 Tab by mouth 3 times a day as needed. 30 Tab 0   • levothyroxine (SYNTHROID) 75 MCG Tab TAKE 1 TABLET BY MOUTH EVERY DAY 30 Tab 6   • Blood Glucose Monitoring Suppl SUPPLIES Misc Sig: use daily and prn ssx high or low sugar two times a day. OneTouch Verio test strips  # 90, RF #3; 1 Each 3   • Lancets Misc OneTouch UltraSoft  lancets # 90, RF 3 Sig: use daily and prn ssx high or low sugar two times a day. #100 RF x 3 100 Each 3   • Blood Glucose Monitoring Suppl (ONETOUCH VERIO) W/DEVICE Kit USE TO TEST HIGH OR LOW SUGAR DAILY AS NEEDED  3   • glucose blood (ACCU-CHEK COMPACT PLUS) strip 100  Strips by Other route every day. 100 Strip 3   • Omega-3 Fatty Acids (OMEGA 3 PO) Take  by mouth.     • vitamin D (CHOLECALCIFEROL) 1000 UNIT TABS Take 2,000 Units by mouth every day.     • CALCIUM-VITAMIN D PO Take 1 Tab by mouth every 48 hours as needed.     • Multiple Vitamins-Minerals (WOMENS MULTI PO) Take 1 Tab by mouth every day.     • metformin (GLUCOPHAGE) 500 MG Tab Take 1 Tab by mouth every day. 30 Tab 3   • metformin ER (GLUCOPHAGE XR) 500 MG TABLET SR 24 HR Take 1 Tab by mouth every day. (Patient not taking: Reported on 7/13/2017) 90 Tab 0   • Diclofenac Sodium 1 % GEL Apply 4 g to affected area(s) 3 times a day as needed. 1 Tube 2   • triamcinolone acetonide (KENALOG) 0.1 % OINT Apply 1 Application to affected area(s) 2 times a day. 1 Tube 1     No current facility-administered medications on file prior to visit.        Allergies   Allergen Reactions   • Pcn [Penicillins]        Patient Active Problem List    Diagnosis Date Noted   • Type 2 diabetes mellitus without complication, without long-term current use of insulin ([facility]) 07/14/2017   • Nonintractable episodic headache 07/14/2017   • Visit for screening mammogram 10/10/2016   • Cervical cancer screening 10/10/2016   • Hypertriglyceridemia 06/16/2016   • Vitamin D insufficiency 12/29/2015   • Postmenopausal 06/23/2014   • Diabetes mellitus, type 2 (CMS-[facility]) 04/21/2014   • Dyslipidemia 08/28/2013   • Suprapubic discomfort 08/16/2012   • GERD (gastroesophageal reflux disease) 06/05/2012   • Abdominal pain, chronic, right lower quadrant 09/15/2011   • Fibroid uterus 09/14/2010   • Iron defic anemia NEC 09/14/2010   • Hypothyroidism 09/14/2010       Past Medical History:   Diagnosis Date   • Allergy    • Anemia    • Blood transfusion 1986    Brother's blood   • Diabetes mellitus type II 4/18/2011   • Dyslipidemia 8/28/2013   • GERD (gastroesophageal reflux disease)     Off and on with certain foods.   • Headache(784.0)    • Heart murmur    • Measles   "  • Pyelonephritis 2002   • S/P colonoscopy 2010    negative-repeat 10 years   • Thyroid disease    • Uterine fibroid 1986   • Uterine fibroid     has seen gynecology   • Varicella          OBJECTIVE:   /78   Pulse 73   Temp 36.7 °C (98 °F)   Ht 1.568 m (5' 1.75\")   Wt 61.8 kg (136 lb 3.2 oz)   SpO2 98%   BMI 25.11 kg/m²   General: Well-developed well-nourished female, no acute distress  Neck: supple, no lymphadenopathy- cervical or supraclavicular, no thyromegaly  Cardiovascular: regular rate and rhythm, no murmurs, gallops, rubs  Lungs: clear to auscultation bilaterally, no wheezes, crackles, or rhonchi  Extremities: no cyanosis, clubbing, edema  Skin: Warm and dry  Psych: appropriate mood and affect        ASSESSMENT/PLAN:    57 y.o.female    1. Positional vertigo of right ear- Currently with good improvement in symptoms. She will continue with home exercises. Meclizine as needed. We'll continue to monitor at this time.     2. Type 2 diabetes mellitus without complication, without long-term current use of insulin ([facility])- she is currently off metformin due to recent symptoms of vertigo.   She will hold off on medication therapy at this time. Her diabetes has been overall well controlled. She will restart that her balance appointment at this time. Monitor glucose levels. Diabetic diet discussed. Routine exercises tolerated.     3. Elevated BP without diagnosis of hypertension- Blood pressure appears to have normalized. Discussed taking losartan 25 mg daily tolerated due to patient's diabetes. Continue to monitor blood pressure.           Return in about 3 weeks (around 11/16/2017).    This medical record contains text that has been entered with the assistance of computer voice recognition and dictation software.  Therefore, it may contain unintended errors in text, spelling, punctuation, or grammar.                          "

## 2017-10-30 ENCOUNTER — TELEPHONE (OUTPATIENT)
Dept: MEDICAL GROUP | Age: 57
End: 2017-10-30

## 2017-10-30 NOTE — TELEPHONE ENCOUNTER
Notes Recorded by Laisha Tovar M.D. on 10/28/2017 at 11:25 AM PDT  Please let pt know mammogram negative, but she has dense breasts which can lower sensitivity of the test. Recommend routine screening in one year.

## 2017-11-30 ENCOUNTER — OFFICE VISIT (OUTPATIENT)
Dept: MEDICAL GROUP | Age: 57
End: 2017-11-30
Payer: COMMERCIAL

## 2017-11-30 VITALS
BODY MASS INDEX: 25.28 KG/M2 | HEART RATE: 87 BPM | DIASTOLIC BLOOD PRESSURE: 68 MMHG | SYSTOLIC BLOOD PRESSURE: 110 MMHG | TEMPERATURE: 97.9 F | HEIGHT: 62 IN | WEIGHT: 137.4 LBS | OXYGEN SATURATION: 98 %

## 2017-11-30 DIAGNOSIS — E11.8 TYPE 2 DIABETES MELLITUS WITH COMPLICATION, UNSPECIFIED LONG TERM INSULIN USE STATUS: ICD-10-CM

## 2017-11-30 DIAGNOSIS — E78.5 DYSLIPIDEMIA: ICD-10-CM

## 2017-11-30 DIAGNOSIS — E03.9 HYPOTHYROIDISM, UNSPECIFIED TYPE: ICD-10-CM

## 2017-11-30 DIAGNOSIS — Z23 NEED FOR HEPATITIS B VACCINATION: ICD-10-CM

## 2017-11-30 DIAGNOSIS — R01.1 CARDIAC MURMUR: ICD-10-CM

## 2017-11-30 PROCEDURE — 90471 IMMUNIZATION ADMIN: CPT | Performed by: FAMILY MEDICINE

## 2017-11-30 PROCEDURE — 99214 OFFICE O/P EST MOD 30 MIN: CPT | Mod: 25 | Performed by: FAMILY MEDICINE

## 2017-11-30 PROCEDURE — 90746 HEPB VACCINE 3 DOSE ADULT IM: CPT | Performed by: FAMILY MEDICINE

## 2017-12-01 NOTE — PROGRESS NOTES
SUBJECTIVE:        Chief Complaint   Patient presents with   • Vertigo       HPI:     Shona Garcia is a 57 y.o. female here for BPPV.   She has been doing her exercises at home.   She is 90% better currently.   Occasionally with looking up or turning to right side may have symptoms.   Sometimes if she does not sleep well her symptoms can be worse and further provoked. Occasionally takes meclizine as needed. No significant headaches or hearing changes currently.     Cardiac murmur- she has had a murmur for some time, but does not recall her last echocardiogram. No chest pain or shortness of breath. No significant palpitations.     Diabetes- there was a concern the metformin could have triggered her vertigo thus she is off the medication and back on bitter melon supplements.   Off losartan currently. BP was stable. Was on medication for a month. She will stay off medications for now due to her recent symptoms as above.   She will need future labs for her hyperlipidemia.   Hypothyroidism- stable on current therapy.     States her brother had a brain tumor, he had seizures but was a benign tumor.   Occasionally ha without drinking not enough or being stressed.       Diabetes Health Maintenance  On aspirin: has stomach issues on asa.    Foot exam: 4/2017  Eye exam: mild cataracts-had eye exam.   Vaccinations:    Influenza: 2015; Pneumonia: PPSV 23- 2013; Td/Adacel: 2014, Hep B due.   Educated about routine foot exam: recommend routine check  Pap-per Dr. Westfall    ROS:  Denies any recent fevers or chills. No nausea or vomiting. No diarrhea. No chest pains or shortness of breath. No lower extremity edema.    Current Outpatient Prescriptions on File Prior to Visit   Medication Sig Dispense Refill   • meclizine (ANTIVERT) 25 MG Tab Take 1 Tab by mouth 3 times a day as needed. 30 Tab 0   • levothyroxine (SYNTHROID) 75 MCG Tab TAKE 1 TABLET BY MOUTH EVERY DAY 30 Tab 6   • Blood Glucose Monitoring Suppl  SUPPLIES Misc Sig: use daily and prn ssx high or low sugar two times a day. OneTouch Verio test strips  # 90, RF #3; 1 Each 3   • Lancets Misc OneTouch UltraSoft  lancets # 90, RF 3 Sig: use daily and prn ssx high or low sugar two times a day. #100 RF x 3 100 Each 3   • Blood Glucose Monitoring Suppl (ONETOUCH VERIO) W/DEVICE Kit USE TO TEST HIGH OR LOW SUGAR DAILY AS NEEDED  3   • glucose blood (ACCU-CHEK COMPACT PLUS) strip 100 Strips by Other route every day. 100 Strip 3   • Omega-3 Fatty Acids (OMEGA 3 PO) Take  by mouth.     • vitamin D (CHOLECALCIFEROL) 1000 UNIT TABS Take 2,000 Units by mouth every day.     • Diclofenac Sodium 1 % GEL Apply 4 g to affected area(s) 3 times a day as needed. 1 Tube 2   • triamcinolone acetonide (KENALOG) 0.1 % OINT Apply 1 Application to affected area(s) 2 times a day. 1 Tube 1   • CALCIUM-VITAMIN D PO Take 1 Tab by mouth every 48 hours as needed.     • Multiple Vitamins-Minerals (WOMENS MULTI PO) Take 1 Tab by mouth every day.     • losartan (COZAAR) 25 MG Tab Take 1 Tab by mouth every day. (Patient not taking: Reported on 11/30/2017) 30 Tab 3   • metformin (GLUCOPHAGE) 500 MG Tab Take 1 Tab by mouth every day. (Patient not taking: Reported on 11/30/2017) 30 Tab 3   • metformin ER (GLUCOPHAGE XR) 500 MG TABLET SR 24 HR Take 1 Tab by mouth every day. (Patient not taking: Reported on 11/30/2017) 90 Tab 0     No current facility-administered medications on file prior to visit.        Allergies   Allergen Reactions   • Pcn [Penicillins]        Patient Active Problem List    Diagnosis Date Noted   • Type 2 diabetes mellitus without complication, without long-term current use of insulin ([facility]) 07/14/2017   • Nonintractable episodic headache 07/14/2017   • Visit for screening mammogram 10/10/2016   • Cervical cancer screening 10/10/2016   • Hypertriglyceridemia 06/16/2016   • Vitamin D insufficiency 12/29/2015   • Postmenopausal 06/23/2014   • Diabetes mellitus, type 2 (CMS-[facility])  "04/21/2014   • Dyslipidemia 08/28/2013   • Suprapubic discomfort 08/16/2012   • GERD (gastroesophageal reflux disease) 06/05/2012   • Abdominal pain, chronic, right lower quadrant 09/15/2011   • Fibroid uterus 09/14/2010   • Iron defic anemia NEC 09/14/2010   • Hypothyroidism 09/14/2010       Past Medical History:   Diagnosis Date   • Allergy    • Anemia    • Blood transfusion 1986    Brother's blood   • Diabetes mellitus type II 4/18/2011   • Dyslipidemia 8/28/2013   • GERD (gastroesophageal reflux disease)     Off and on with certain foods.   • Headache(784.0)    • Heart murmur    • Measles    • Pyelonephritis 2002   • S/P colonoscopy 2010    negative-repeat 10 years   • Thyroid disease    • Uterine fibroid 1986   • Uterine fibroid     has seen gynecology   • Varicella              OBJECTIVE:   /68   Pulse 87   Temp 36.6 °C (97.9 °F)   Ht 1.568 m (5' 1.75\")   Wt 62.3 kg (137 lb 6.4 oz)   SpO2 98%   BMI 25.33 kg/m²   General: Well-developed well-nourished female, no acute distress  Neck: supple, no lymphadenopathy- cervical or supraclavicular, no thyromegaly  Cardiovascular: regular rate and rhythm, 2/6 SHANKAR, no gallops, rubs  Lungs: clear to auscultation bilaterally, no wheezes, crackles, or rhonchi  Abdomen: +bowel sounds, soft, nontender, nondistended, no rebound, no guarding, no hepatosplenomegaly  Extremities: no cyanosis, clubbing, edema  Skin: Warm and dry  Psych: appropriate mood and affect    Dru hallpike to right- improved.      Ref. Range 10/16/2017 08:23 10/23/2017 16:33   Sodium Latest Ref Range: 135 - 145 mmol/L 132 (L)    Potassium Latest Ref Range: 3.6 - 5.5 mmol/L 4.1    Chloride Latest Ref Range: 96 - 112 mmol/L 100    Co2 Latest Ref Range: 20 - 33 mmol/L 25    Anion Gap Latest Ref Range: 0.0 - 11.9  7.0    Glucose Latest Ref Range: 65 - 99 mg/dL 110 (H)    Bun Latest Ref Range: 8 - 22 mg/dL 8    Creatinine Latest Ref Range: 0.50 - 1.40 mg/dL 0.50    GFR If  Latest Ref " Range: >60 mL/min/1.73 m 2 >60    GFR If Non  Latest Ref Range: >60 mL/min/1.73 m 2 >60    Calcium Latest Ref Range: 8.5 - 10.5 mg/dL 9.3    AST(SGOT) Latest Ref Range: 12 - 45 U/L 18    ALT(SGPT) Latest Ref Range: 2 - 50 U/L 17    Alkaline Phosphatase Latest Ref Range: 30 - 99 U/L 54    Total Bilirubin Latest Ref Range: 0.1 - 1.5 mg/dL 0.5    Albumin Latest Ref Range: 3.2 - 4.9 g/dL 3.9    Total Protein Latest Ref Range: 6.0 - 8.2 g/dL 6.8    Globulin Latest Ref Range: 1.9 - 3.5 g/dL 2.9    A-G Ratio Latest Units: g/dL 1.3    Glycohemoglobin Latest Ref Range: 0.0 - 5.6 % 7.0 (H)    Estim. Avg Glu Latest Units: mg/dL 154    Cholesterol,Tot Latest Ref Range: 100 - 199 mg/dL 143    Triglycerides Latest Ref Range: 0 - 149 mg/dL 154 (H)    HDL Latest Ref Range: >=40 mg/dL 41    LDL Latest Ref Range: <100 mg/dL 71    Antinuclear Antibody Latest Ref Range: None Detected  None Detected    MA-MAMMO SCREENING BILAT W/TOMOSYNTHESIS W/CAD Unknown  Attch     ASSESSMENT/PLAN:    57 y.o.female      1. Positional vertigo of right ear- significantly improved. Currently appears benign. Continue current exercises, meclizine as needed.  Monitor.     2. Cardiac murmur  Will obtain echocardiogram for evaluation and current status.   ECHOCARDIOGRAM COMP W/O CONT   3. Type 2 diabetes mellitus with complication, unspecified long term insulin use status (CMS-HCC) - she will continue on current supplement and remain off metformin therapy. Diabetic diet.  COMP METABOLIC PANEL    HEMOGLOBIN A1C   4. Hypothyroidism, unspecified type - stable, continue current medication.     5. Dyslipidemia- stable, low cholesterol, high fiber diet.   LIPID PROFILE   6. Need for hepatitis B vaccination  Hep B Adult 20+     Return in about 6 weeks (around 1/11/2018).    This medical record contains text that has been entered with the assistance of computer voice recognition and dictation software.  Therefore, it may contain unintended errors in  text, spelling, punctuation, or grammar.

## 2017-12-08 ENCOUNTER — PATIENT MESSAGE (OUTPATIENT)
Dept: MEDICAL GROUP | Age: 57
End: 2017-12-08

## 2017-12-08 DIAGNOSIS — E11.9 TYPE 2 DIABETES MELLITUS WITHOUT COMPLICATION, WITHOUT LONG-TERM CURRENT USE OF INSULIN (HCC): ICD-10-CM

## 2017-12-14 ENCOUNTER — HOSPITAL ENCOUNTER (OUTPATIENT)
Dept: CARDIOLOGY | Facility: MEDICAL CENTER | Age: 57
End: 2017-12-14
Attending: FAMILY MEDICINE
Payer: COMMERCIAL

## 2017-12-14 DIAGNOSIS — R01.1 CARDIAC MURMUR: ICD-10-CM

## 2017-12-14 LAB
LV EJECT FRACT  99904: 65
LV EJECT FRACT MOD 2C 99903: 73.85
LV EJECT FRACT MOD 4C 99902: 74.83
LV EJECT FRACT MOD BP 99901: 74.53

## 2017-12-14 PROCEDURE — 93306 TTE W/DOPPLER COMPLETE: CPT

## 2017-12-14 PROCEDURE — 93306 TTE W/DOPPLER COMPLETE: CPT | Mod: 26 | Performed by: INTERNAL MEDICINE

## 2017-12-14 RX ORDER — LANCETS 30 GAUGE
EACH MISCELLANEOUS
Qty: 100 EACH | Refills: 0 | Status: SHIPPED
Start: 2017-12-14 | End: 2018-01-18 | Stop reason: SDUPTHER

## 2018-01-15 ENCOUNTER — HOSPITAL ENCOUNTER (OUTPATIENT)
Dept: LAB | Facility: MEDICAL CENTER | Age: 58
End: 2018-01-15
Attending: FAMILY MEDICINE
Payer: COMMERCIAL

## 2018-01-15 DIAGNOSIS — E11.8 TYPE 2 DIABETES MELLITUS WITH COMPLICATION, UNSPECIFIED LONG TERM INSULIN USE STATUS: ICD-10-CM

## 2018-01-15 DIAGNOSIS — E78.5 DYSLIPIDEMIA: ICD-10-CM

## 2018-01-15 LAB
ALBUMIN SERPL BCP-MCNC: 4.3 G/DL (ref 3.2–4.9)
ALBUMIN/GLOB SERPL: 1.7 G/DL
ALP SERPL-CCNC: 49 U/L (ref 30–99)
ALT SERPL-CCNC: 19 U/L (ref 2–50)
ANION GAP SERPL CALC-SCNC: 8 MMOL/L (ref 0–11.9)
AST SERPL-CCNC: 16 U/L (ref 12–45)
BILIRUB SERPL-MCNC: 0.5 MG/DL (ref 0.1–1.5)
BUN SERPL-MCNC: 10 MG/DL (ref 8–22)
CALCIUM SERPL-MCNC: 9 MG/DL (ref 8.5–10.5)
CHLORIDE SERPL-SCNC: 101 MMOL/L (ref 96–112)
CHOLEST SERPL-MCNC: 156 MG/DL (ref 100–199)
CO2 SERPL-SCNC: 24 MMOL/L (ref 20–33)
CREAT SERPL-MCNC: 0.63 MG/DL (ref 0.5–1.4)
EST. AVERAGE GLUCOSE BLD GHB EST-MCNC: 160 MG/DL
GLOBULIN SER CALC-MCNC: 2.5 G/DL (ref 1.9–3.5)
GLUCOSE SERPL-MCNC: 137 MG/DL (ref 65–99)
HBA1C MFR BLD: 7.2 % (ref 0–5.6)
HDLC SERPL-MCNC: 42 MG/DL
LDLC SERPL CALC-MCNC: 86 MG/DL
POTASSIUM SERPL-SCNC: 4 MMOL/L (ref 3.6–5.5)
PROT SERPL-MCNC: 6.8 G/DL (ref 6–8.2)
SODIUM SERPL-SCNC: 133 MMOL/L (ref 135–145)
TRIGL SERPL-MCNC: 138 MG/DL (ref 0–149)

## 2018-01-15 PROCEDURE — 80053 COMPREHEN METABOLIC PANEL: CPT

## 2018-01-15 PROCEDURE — 80061 LIPID PANEL: CPT

## 2018-01-15 PROCEDURE — 83036 HEMOGLOBIN GLYCOSYLATED A1C: CPT

## 2018-01-15 PROCEDURE — 36415 COLL VENOUS BLD VENIPUNCTURE: CPT

## 2018-01-18 ENCOUNTER — OFFICE VISIT (OUTPATIENT)
Dept: MEDICAL GROUP | Age: 58
End: 2018-01-18
Payer: COMMERCIAL

## 2018-01-18 VITALS
DIASTOLIC BLOOD PRESSURE: 72 MMHG | OXYGEN SATURATION: 98 % | WEIGHT: 138.8 LBS | HEART RATE: 74 BPM | HEIGHT: 63 IN | TEMPERATURE: 97.8 F | BODY MASS INDEX: 24.59 KG/M2 | SYSTOLIC BLOOD PRESSURE: 126 MMHG

## 2018-01-18 DIAGNOSIS — E11.9 TYPE 2 DIABETES MELLITUS WITHOUT COMPLICATION, WITHOUT LONG-TERM CURRENT USE OF INSULIN (HCC): Primary | ICD-10-CM

## 2018-01-18 DIAGNOSIS — D25.9 UTERINE LEIOMYOMA, UNSPECIFIED LOCATION: ICD-10-CM

## 2018-01-18 DIAGNOSIS — E03.9 HYPOTHYROIDISM, UNSPECIFIED TYPE: ICD-10-CM

## 2018-01-18 PROBLEM — R51.9 NONINTRACTABLE EPISODIC HEADACHE: Status: RESOLVED | Noted: 2017-07-14 | Resolved: 2018-01-18

## 2018-01-18 PROCEDURE — 99214 OFFICE O/P EST MOD 30 MIN: CPT | Performed by: FAMILY MEDICINE

## 2018-01-18 RX ORDER — LANCETS 30 GAUGE
EACH MISCELLANEOUS
Qty: 100 EACH | Refills: 2 | Status: SHIPPED | OUTPATIENT
Start: 2018-01-18 | End: 2018-07-19 | Stop reason: SDUPTHER

## 2018-01-18 RX ORDER — METFORMIN HYDROCHLORIDE 500 MG/1
500 TABLET, EXTENDED RELEASE ORAL DAILY
Qty: 180 TAB | Refills: 1 | Status: SHIPPED | OUTPATIENT
Start: 2018-01-18 | End: 2018-04-19

## 2018-01-18 NOTE — LETTER
[facility]  Jasmin Hall M.D.  [address] [place] [address]  Fax: [phone]   Authorization for Release/Disclosure of   Protected Health Information   Name: SHONA KAUFMAN : 1960 SSN: [id number]   Address: [address] [place] [address] [address] [place] [address] Phone:    [phone] (home)    I authorize the entity listed below to release/disclose the PHI below to:   [facility]/Jasmin Hall M.D. and Jasmin Hall M.D.   Provider or Entity Name:  Dr. Clancy   Address   [facility], [facility], [facility]   Phone:      Fax:  [phone]   Reason for request: continuity of care   Information to be released:    [  ] LAST COLONOSCOPY,  including any PATH REPORT and follow-up  [  ] LAST FIT/COLOGUARD RESULT [  ] LAST DEXA  [  ] LAST MAMMOGRAM  [ x ] LAST PAP  [  ] LAST LABS [  ] RETINA EXAM REPORT  [  ] IMMUNIZATION RECORDS  [  ] Release all info      [  ] Check here and initial the line next to each item to release ALL health information INCLUDING  _____ Care and treatment for drug and / or alcohol abuse  _____ HIV testing, infection status, or AIDS  _____ Genetic Testing    DATES OF SERVICE OR TIME PERIOD TO BE DISCLOSED: _____________  I understand and acknowledge that:  * This Authorization may be revoked at any time by you in writing, except if your health information has already been used or disclosed.  * Your health information that will be used or disclosed as a result of you signing this authorization could be re-disclosed by the recipient. If this occurs, your re-disclosed health information may no longer be protected by State or Federal laws.  * You may refuse to sign this Authorization. Your refusal will not affect your ability to obtain treatment.  * This Authorization becomes effective upon signing and will  on (date) __________.      If no date is indicated, this Authorization will  one (1) year from the signature date.    Name: Shona Kaufman    Signature:   Date:     2018            PLEASE FAX REQUESTED RECORDS BACK TO: [phone]

## 2018-01-18 NOTE — PROGRESS NOTES
Subjective:   CC: Establish care    HPI:     Shona Garcia is a 57 y.o. female, established patient of the clinic, presents with the following concerns:     1. Type 2 diabetes mellitus without complication, without long-term current use of insulin (HCC)  Chronic, previously taking metformin. However, she developed acute vertigo with metformin, so she stopped taking metformin. Vertigo resolved with meclizine. Patient is symptom-free and interested in restarting metformin again. She is working on lifestyle modification. She is taking bitter marlon shakes daily for diabetes.    2. Hypothyroidism, unspecified type  Chronic, taking levothyroxine 75 µg daily, denies any side effect.    3. Uterine leiomyoma, unspecified location  Patient has history of chronic, recurrent uterine fibroid, status post fibroidectomy during pregnancy in [place]. She had repeat pelvic ultrasound in February 2017, which noted for one large fibroid approximately 8 cm and 2 small endocervical canal masses. Patient is asymptomatic. She has consistent follow-up with Dr. Clancy.    Current medicines (including changes today)  Current Outpatient Prescriptions   Medication Sig Dispense Refill   • Probiotic Product (PROBIOTIC ADVANCED PO) Take  by mouth.     • metformin ER (GLUCOPHAGE XR) 500 MG TABLET SR 24 HR Take 1 Tab by mouth every day. 180 Tab 1   • glucose blood (ACCU-CHEK COMPACT PLUS) strip 100 Strips by Other route every day. 100 Strip 3   • Lancets Misc OneTouch UltraSoft  lancets # 90, RF 3 Sig: use daily and prn ssx high or low sugar two times a day. #100 RF x 3 100 Each 2   • levothyroxine (SYNTHROID) 75 MCG Tab TAKE 1 TABLET BY MOUTH EVERY DAY 30 Tab 6   • Blood Glucose Monitoring Suppl (ONETOUCH VERIO) W/DEVICE Kit USE TO TEST HIGH OR LOW SUGAR DAILY AS NEEDED  3   • Omega-3 Fatty Acids (OMEGA 3 PO) Take  by mouth.     • vitamin D (CHOLECALCIFEROL) 1000 UNIT TABS Take 2,000 Units by mouth every day.       No current  "facility-administered medications for this visit.      She  has a past medical history of Allergy; Anemia; Blood transfusion (1986); Diabetes mellitus type II (4/18/2011); Dyslipidemia (8/28/2013); GERD (gastroesophageal reflux disease); Headache(784.0); Heart murmur; Measles; Pyelonephritis (2002); S/P colonoscopy (2010); Thyroid disease; Uterine fibroid (1986); Uterine fibroid; and Varicella. She also has no past medical history of Addisons disease (CMS-HCC); Adrenal disorder (CMS-HCC); Anxiety; Arrhythmia; ASTHMA; Cancer (CMS-HCC); CATARACT; CHF (congestive heart failure) (CMS-HCC); Clotting disorder (CMS-HCC); COPD; Cushings syndrome (CMS-HCC); Depression; EMPHYSEMA; Glaucoma; Goiter; Heart attack; Hypertension; IBD (inflammatory bowel disease); Meningitis; Migraine; Muscle disorder; OSTEOPOROSIS; Parathyroid disorder (CMS-HCC); Pituitary disease (CMS-HCC); Seizure (CMS-HCC); Stroke (CMS-HCC); Substance abuse; Tuberculosis; Ulcer (CMS-HCC); or Urinary tract infection, site not specified.    I personally reviewed patient's problem list, allergies, medications, family hx, social hx with patient and update EPIC.     REVIEW OF SYSTEMS:  CONSTITUTIONAL:  Denies night sweats, fatigue, malaise, lethargy, fever or chills.  RESPIRATORY:  Denies cough, wheeze, hemoptysis, or shortness of breath.  CARDIOVASCULAR:  Denies chest pains, palpitations, pedal edema     Objective:     Blood pressure 126/72, pulse 74, temperature 36.6 °C (97.8 °F), height 1.6 m (5' 3\"), weight 63 kg (138 lb 12.8 oz), SpO2 98 %. Body mass index is 24.59 kg/m².    Physical Exam:  Constitutional: awake, alert, in no distress.  Skin: Warm, dry, good turgor, no rashes, bruises, ulcers in visible areas.  Eye: conjunctiva clear, lids neg for edema or lesions.  Neck: Trachea midline, no masses, no thyromegaly. No cervical or supraclavicular lymphadenopathy  Respiratory: Unlabored respiratory effort, lungs clear to auscultation, no wheezes, no rhonchi, no " rales.  Cardiovascular: Normal S1, S2, no murmur, no pedal edema.  Psych: Oriented x3, affect and mood wnl, intact judgement and insight.       Assessment and Plan:   The following treatment plan was discussed    1. Type 2 diabetes mellitus without complication, without long-term current use of insulin (HCC)  Chronic, diet control, A1c 7.2. Denies visual changes, symptoms of polyneuropathy, and concerning lesions on her feet. She was started on Metformin couple months ago, but self discontinued due to concerns of developing vertigo. Vertigo has resolved with Meclizine. She is interested in restarting Metformin.   - metformin ER (GLUCOPHAGE XR) 500 MG TABLET SR 24 HR; Take 1 Tab by mouth every day.  Dispense: 180 Tab; Refill: 1  - glucose blood (ACCU-CHEK COMPACT PLUS) strip; 100 Strips by Other route every day.  Dispense: 100 Strip; Refill: 3  - Lancets Misc; OneTouch UltraSoft  lancets # 90, RF 3 Sig: use daily and prn ssx high or low sugar two times a day. #100 RF x 3  Dispense: 100 Each; Refill: 2  - HEMOGLOBIN A1C; Future  - Discussed dietary modification, exercise, weight loss  - Annual eye exam  - Regular foot exam  - Side effects of all medications discussed with patient  - Follow up in 3 months.    2. Hypothyroidism, unspecified type  Chronic, well controlled with levothyroxine 75 µg daily, due for lab.   - continue Levothyroxine 75 mcg daily, will adjust if indicated.   - TSH; Future    3. Uterine leiomyoma, unspecified location  Patient has history of chronic, recurrent uterine fibroid, status post fibroidectomy during pregnancy in [place]. She had repeat pelvic ultrasound in February 2017, which noted for one large fibroid approximately 8 cm and 2 small endocervical canal masses. Patient is asymptomatic. She has consistent follow-up with Dr. Clancy.  - f/u with Dr. Aston Hall M.D.      Followup: Return in about 3 months (around 4/18/2018) for Diabetes.    Please note that this dictation was  created using voice recognition software. I have made every reasonable attempt to correct obvious errors, but I expect that there are errors of grammar and possibly content that I did not discover before finalizing the note.

## 2018-03-09 ENCOUNTER — OFFICE VISIT (OUTPATIENT)
Dept: URGENT CARE | Facility: CLINIC | Age: 58
End: 2018-03-09
Payer: COMMERCIAL

## 2018-03-09 ENCOUNTER — HOSPITAL ENCOUNTER (OUTPATIENT)
Facility: MEDICAL CENTER | Age: 58
End: 2018-03-09
Attending: PHYSICIAN ASSISTANT
Payer: COMMERCIAL

## 2018-03-09 VITALS
WEIGHT: 140 LBS | SYSTOLIC BLOOD PRESSURE: 120 MMHG | RESPIRATION RATE: 16 BRPM | HEART RATE: 78 BPM | BODY MASS INDEX: 24.8 KG/M2 | OXYGEN SATURATION: 98 % | TEMPERATURE: 98.7 F | DIASTOLIC BLOOD PRESSURE: 78 MMHG | HEIGHT: 63 IN

## 2018-03-09 DIAGNOSIS — N30.00 ACUTE CYSTITIS WITHOUT HEMATURIA: ICD-10-CM

## 2018-03-09 LAB
APPEARANCE UR: NORMAL
BILIRUB UR STRIP-MCNC: NORMAL MG/DL
COLOR UR AUTO: YELLOW
GLUCOSE UR STRIP.AUTO-MCNC: NORMAL MG/DL
KETONES UR STRIP.AUTO-MCNC: NORMAL MG/DL
LEUKOCYTE ESTERASE UR QL STRIP.AUTO: NORMAL
NITRITE UR QL STRIP.AUTO: NORMAL
PH UR STRIP.AUTO: NORMAL [PH] (ref 5–8)
PROT UR QL STRIP: 5 MG/DL
RBC UR QL AUTO: NORMAL
SP GR UR STRIP.AUTO: 1
UROBILINOGEN UR STRIP-MCNC: NORMAL MG/DL

## 2018-03-09 PROCEDURE — 87086 URINE CULTURE/COLONY COUNT: CPT

## 2018-03-09 PROCEDURE — 99214 OFFICE O/P EST MOD 30 MIN: CPT | Performed by: PHYSICIAN ASSISTANT

## 2018-03-09 PROCEDURE — 81002 URINALYSIS NONAUTO W/O SCOPE: CPT | Performed by: PHYSICIAN ASSISTANT

## 2018-03-09 RX ORDER — NITROFURANTOIN 25; 75 MG/1; MG/1
100 CAPSULE ORAL EVERY 12 HOURS
Qty: 10 CAP | Refills: 0 | Status: SHIPPED | OUTPATIENT
Start: 2018-03-09 | End: 2018-03-14

## 2018-03-09 RX ORDER — LANCETS 21 GAUGE
EACH MISCELLANEOUS
Refills: 2 | COMMUNITY
Start: 2018-01-29 | End: 2018-07-19

## 2018-03-09 RX ORDER — PHENAZOPYRIDINE HYDROCHLORIDE 200 MG/1
200 TABLET, FILM COATED ORAL 3 TIMES DAILY
Qty: 6 TAB | Refills: 0 | Status: SHIPPED | OUTPATIENT
Start: 2018-03-09 | End: 2018-03-11

## 2018-03-09 ASSESSMENT — ENCOUNTER SYMPTOMS
SHORTNESS OF BREATH: 0
FEVER: 0
BACK PAIN: 0
PALPITATIONS: 0
COUGH: 0
FLANK PAIN: 0
CHILLS: 0

## 2018-03-09 ASSESSMENT — PAIN SCALES - GENERAL: PAINLEVEL: 5=MODERATE PAIN

## 2018-03-09 NOTE — PROGRESS NOTES
Subjective:      Shona Garcia is a 57 y.o. female who presents with Dysuria (began 3 days ago but was treating it as yeast infection but today the pain is worse )            Dysuria    This is a new problem. The current episode started in the past 7 days. The problem has been unchanged. The pain is moderate. Associated symptoms include frequency, hesitancy and urgency. Pertinent negatives include no chills, flank pain or hematuria. She has tried nothing for the symptoms.       Review of Systems   Constitutional: Negative for chills and fever.   Respiratory: Negative for cough and shortness of breath.    Cardiovascular: Negative for chest pain and palpitations.   Genitourinary: Positive for dysuria, frequency, hesitancy and urgency. Negative for flank pain and hematuria.   Musculoskeletal: Negative for back pain.   All other systems reviewed and are negative.    PMH:  has a past medical history of Allergy; Anemia; Blood transfusion (1986); Diabetes mellitus type II (4/18/2011); Dyslipidemia (8/28/2013); GERD (gastroesophageal reflux disease); Headache(784.0); Heart murmur; Measles; Pyelonephritis (2002); S/P colonoscopy (2010); Thyroid disease; Uterine fibroid (1986); Uterine fibroid; and Varicella. She also has no past medical history of Addisons disease (CMS-HCC); Adrenal disorder (CMS-HCC); Anxiety; Arrhythmia; ASTHMA; Cancer (CMS-HCC); CATARACT; CHF (congestive heart failure) (CMS-HCC); Clotting disorder (CMS-HCC); COPD; Cushings syndrome (CMS-HCC); Depression; EMPHYSEMA; Glaucoma; Goiter; Heart attack; Hypertension; IBD (inflammatory bowel disease); Meningitis; Migraine; Muscle disorder; OSTEOPOROSIS; Parathyroid disorder (CMS-HCC); Pituitary disease (CMS-HCC); Seizure (CMS-HCC); Stroke (CMS-HCC); Substance abuse; Tuberculosis; Ulcer (CMS-HCC); or Urinary tract infection, site not specified.  MEDS:   Current Outpatient Prescriptions:   •  nitrofurantoin monohydr macro (MACROBID) 100 MG Cap, Take  "1 Cap by mouth every 12 hours for 5 days., Disp: 10 Cap, Rfl: 0  •  phenazopyridine (PYRIDIUM) 200 MG Tab, Take 1 Tab by mouth 3 times a day for 2 days., Disp: 6 Tab, Rfl: 0  •  Probiotic Product (PROBIOTIC ADVANCED PO), Take  by mouth., Disp: , Rfl:   •  metformin ER (GLUCOPHAGE XR) 500 MG TABLET SR 24 HR, Take 1 Tab by mouth every day., Disp: 180 Tab, Rfl: 1  •  levothyroxine (SYNTHROID) 75 MCG Tab, TAKE 1 TABLET BY MOUTH EVERY DAY, Disp: 30 Tab, Rfl: 6  •  Omega-3 Fatty Acids (OMEGA 3 PO), Take  by mouth., Disp: , Rfl:   •  vitamin D (CHOLECALCIFEROL) 1000 UNIT TABS, Take 2,000 Units by mouth every day., Disp: , Rfl:   •  Lancets Misc. (ONE TOUCH SURESOFT) Misc, USE TO TEST DAILY AS NEEDED FOR SYMPTOMS OF HIGH OR LOW SUGAR TWICE A DAY, Disp: , Rfl: 2  •  glucose blood (ACCU-CHEK COMPACT PLUS) strip, 100 Strips by Other route every day., Disp: 100 Strip, Rfl: 3  •  Lancets Misc, OneTouch UltraSoft  lancets # 90, RF 3 Sig: use daily and prn ssx high or low sugar two times a day. #100 RF x 3, Disp: 100 Each, Rfl: 2  •  Blood Glucose Monitoring Suppl (ONETOUCH VERIO) W/DEVICE Kit, USE TO TEST HIGH OR LOW SUGAR DAILY AS NEEDED, Disp: , Rfl: 3  ALLERGIES:   Allergies   Allergen Reactions   • Pcn [Penicillins]      SURGHX:   Past Surgical History:   Procedure Laterality Date   • LUNG BIOPSY OPEN  1995    Parasite   • TUBE & ECTOPIC PREG., REMOVAL  1989   • PRIMARY C SECTION       SOCHX:  reports that she has never smoked. She has never used smokeless tobacco. She reports that she does not drink alcohol or use drugs.  FH: Family history was reviewed, no pertinent findings to report  Medications, Allergies, and current problem list reviewed today in Epic     Objective:     /78   Pulse 78   Temp 37.1 °C (98.7 °F)   Resp 16   Ht 1.6 m (5' 3\")   Wt 63.5 kg (140 lb)   SpO2 98%   Breastfeeding? No   BMI 24.80 kg/m²      Physical Exam   Constitutional: She is oriented to person, place, and time. She appears " well-developed and well-nourished.   Neck: Normal range of motion. Neck supple.   Cardiovascular: Normal rate, regular rhythm and normal heart sounds.    Pulmonary/Chest: Effort normal and breath sounds normal.   Musculoskeletal: She exhibits no tenderness.   No CVA tenderness   Neurological: She is alert and oriented to person, place, and time.   Skin: Skin is warm and dry.   Psychiatric: She has a normal mood and affect. Her behavior is normal. Judgment and thought content normal.   Vitals reviewed.              Assessment/Plan:     1. Acute cystitis without hematuria    - POCT Urinalysis  - Urine Culture; Future  - nitrofurantoin monohydr macro (MACROBID) 100 MG Cap; Take 1 Cap by mouth every 12 hours for 5 days.  Dispense: 10 Cap; Refill: 0  - phenazopyridine (PYRIDIUM) 200 MG Tab; Take 1 Tab by mouth 3 times a day for 2 days.  Dispense: 6 Tab; Refill: 0    Differential diagnosis, natural history, supportive care discussed. Follow-up with primary care provider within 7-10 days, emergency room precautions discussed.  Patient and/or family appears understanding of information.

## 2018-03-10 DIAGNOSIS — N30.00 ACUTE CYSTITIS WITHOUT HEMATURIA: ICD-10-CM

## 2018-03-12 LAB
BACTERIA UR CULT: NORMAL
SIGNIFICANT IND 70042: NORMAL
SITE SITE: NORMAL
SOURCE SOURCE: NORMAL

## 2018-03-14 RX ORDER — LEVOTHYROXINE SODIUM 0.07 MG/1
TABLET ORAL
Qty: 30 TAB | Refills: 6 | Status: SHIPPED | OUTPATIENT
Start: 2018-03-14 | End: 2018-07-19 | Stop reason: SDUPTHER

## 2018-04-17 ENCOUNTER — HOSPITAL ENCOUNTER (OUTPATIENT)
Dept: LAB | Facility: MEDICAL CENTER | Age: 58
End: 2018-04-17
Attending: FAMILY MEDICINE
Payer: COMMERCIAL

## 2018-04-17 DIAGNOSIS — E03.9 HYPOTHYROIDISM, UNSPECIFIED TYPE: ICD-10-CM

## 2018-04-17 DIAGNOSIS — E11.9 TYPE 2 DIABETES MELLITUS WITHOUT COMPLICATION, WITHOUT LONG-TERM CURRENT USE OF INSULIN (HCC): ICD-10-CM

## 2018-04-17 LAB
EST. AVERAGE GLUCOSE BLD GHB EST-MCNC: 151 MG/DL
HBA1C MFR BLD: 6.9 % (ref 0–5.6)
TSH SERPL DL<=0.005 MIU/L-ACNC: 1.99 UIU/ML (ref 0.38–5.33)

## 2018-04-17 PROCEDURE — 84443 ASSAY THYROID STIM HORMONE: CPT

## 2018-04-17 PROCEDURE — 36415 COLL VENOUS BLD VENIPUNCTURE: CPT

## 2018-04-17 PROCEDURE — 83036 HEMOGLOBIN GLYCOSYLATED A1C: CPT

## 2018-04-19 ENCOUNTER — HOSPITAL ENCOUNTER (OUTPATIENT)
Facility: MEDICAL CENTER | Age: 58
End: 2018-04-19
Attending: FAMILY MEDICINE
Payer: COMMERCIAL

## 2018-04-19 ENCOUNTER — OFFICE VISIT (OUTPATIENT)
Dept: MEDICAL GROUP | Age: 58
End: 2018-04-19
Payer: COMMERCIAL

## 2018-04-19 VITALS
TEMPERATURE: 98.3 F | BODY MASS INDEX: 24.27 KG/M2 | WEIGHT: 137 LBS | SYSTOLIC BLOOD PRESSURE: 122 MMHG | HEIGHT: 63 IN | DIASTOLIC BLOOD PRESSURE: 70 MMHG | OXYGEN SATURATION: 95 % | HEART RATE: 87 BPM

## 2018-04-19 DIAGNOSIS — D25.9 UTERINE LEIOMYOMA, UNSPECIFIED LOCATION: ICD-10-CM

## 2018-04-19 DIAGNOSIS — E11.9 TYPE 2 DIABETES MELLITUS WITHOUT COMPLICATION, WITHOUT LONG-TERM CURRENT USE OF INSULIN (HCC): Primary | ICD-10-CM

## 2018-04-19 DIAGNOSIS — E11.9 TYPE 2 DIABETES MELLITUS WITHOUT COMPLICATION, WITHOUT LONG-TERM CURRENT USE OF INSULIN (HCC): ICD-10-CM

## 2018-04-19 DIAGNOSIS — E03.9 HYPOTHYROIDISM, UNSPECIFIED TYPE: ICD-10-CM

## 2018-04-19 DIAGNOSIS — L71.9 ROSACEA: ICD-10-CM

## 2018-04-19 DIAGNOSIS — N95.2 ATROPHIC VAGINITIS: ICD-10-CM

## 2018-04-19 LAB
CREAT UR-MCNC: 19.2 MG/DL
MICROALBUMIN UR-MCNC: <0.7 MG/DL
MICROALBUMIN/CREAT UR: NORMAL MG/G (ref 0–30)

## 2018-04-19 PROCEDURE — 99214 OFFICE O/P EST MOD 30 MIN: CPT | Performed by: FAMILY MEDICINE

## 2018-04-19 PROCEDURE — 82043 UR ALBUMIN QUANTITATIVE: CPT

## 2018-04-19 PROCEDURE — 82570 ASSAY OF URINE CREATININE: CPT

## 2018-04-19 RX ORDER — NITROFURANTOIN 25; 75 MG/1; MG/1
CAPSULE ORAL
Refills: 0 | COMMUNITY
Start: 2018-03-09 | End: 2018-07-19

## 2018-04-19 RX ORDER — PHENAZOPYRIDINE HYDROCHLORIDE 200 MG/1
TABLET, FILM COATED ORAL
Refills: 0 | COMMUNITY
Start: 2018-03-09 | End: 2018-04-19

## 2018-04-19 RX ORDER — METFORMIN HYDROCHLORIDE 500 MG/1
500 TABLET, EXTENDED RELEASE ORAL DAILY
Qty: 180 TAB | Refills: 1
Start: 2018-04-19 | End: 2018-07-19 | Stop reason: SDUPTHER

## 2018-04-19 ASSESSMENT — PATIENT HEALTH QUESTIONNAIRE - PHQ9: CLINICAL INTERPRETATION OF PHQ2 SCORE: 0

## 2018-04-20 NOTE — PROGRESS NOTES
Subjective:   CC: DM f/u    HPI:     Shona Garcia is a 57 y.o. female, established patient of the clinic, presents with the following concerns:     1. Type 2 diabetes mellitus without complication, without long-term current use of insulin (HCC)  Chronic, on Metformin 500 mg BID, she has been off metformin for 2 months due to some confusion with the drug and is working on dietary modification and exercise. She lost 3 lbs since las OV. Her recent A1C improves from 7.2 to 6.9. Denies visual changes, symptoms of polyneuropathy, or concerning lesions on her feets.     2. Hypothyroidism, unspecified type  Chronic, on Levothyroxine 75 mcg daily, doing well.     3. Uterine leiomyoma, unspecified location  Patient has history of chronic, recurrent uterine fibroid, status post fibroidectomy during pregnancy in [place]. She had repeat pelvic ultrasound in February 2017, which noted for one large fibroid approximately 8 cm and 2 small endocervical canal masses. Patient is asymptomatic. She is postmenopause. She has consistent follow-up with Dr. Clancy.    4. Atrophic vaginitis  Diagnosed by Dr. Clancy. She was prescribed intravaginal estrogen, but has not used it as she does not know how to use it. She c/o vaginal dryness, intermittent itching and dysuria. She went to urgent care last week for mild dysuria and received treatment for UTI. Urinalysis and culture done by  were negative. She tried OTC antifungal cream w/o relief.     5. Rosacea  Pt c/o central face erythema especially her forehead, worse with heat, sun exposure, alcohol, and spicy foods. Pt is embarrassed by this as she is constantly being questioned by family and friends.     Current medicines (including changes today)  Current Outpatient Prescriptions   Medication Sig Dispense Refill   • nitrofurantoin monohydr macro (MACROBID) 100 MG Cap TAKE 1 CAPSULE BY MOUTH EVERY 12 HOURS FOR 5 DAYS.  0   • metFORMIN ER (GLUCOPHAGE XR) 500 MG TABLET SR  24 HR Take 1 Tab by mouth every day. 180 Tab 1   • metronidazole (METROCREAM) 0.75 % cream Apply 1 Application to affected area(s) 2 times a day. 1 Tube 1   • levothyroxine (SYNTHROID) 75 MCG Tab TAKE 1 TABLET BY MOUTH EVERY DAY 30 Tab 6   • Lancets Misc. (ONE TOUCH SURESOFT) Misc USE TO TEST DAILY AS NEEDED FOR SYMPTOMS OF HIGH OR LOW SUGAR TWICE A DAY  2   • Probiotic Product (PROBIOTIC ADVANCED PO) Take  by mouth.     • glucose blood (ACCU-CHEK COMPACT PLUS) strip 100 Strips by Other route every day. 100 Strip 3   • Lancets Misc OneTouch UltraSoft  lancets # 90, RF 3 Sig: use daily and prn ssx high or low sugar two times a day. #100 RF x 3 100 Each 2   • Blood Glucose Monitoring Suppl (ONETOUCH VERIO) W/DEVICE Kit USE TO TEST HIGH OR LOW SUGAR DAILY AS NEEDED  3   • Omega-3 Fatty Acids (OMEGA 3 PO) Take  by mouth.     • vitamin D (CHOLECALCIFEROL) 1000 UNIT TABS Take 2,000 Units by mouth every day.       No current facility-administered medications for this visit.      She  has a past medical history of Allergy; Anemia; Blood transfusion (1986); Diabetes mellitus type II (4/18/2011); Dyslipidemia (8/28/2013); GERD (gastroesophageal reflux disease); Headache(784.0); Heart murmur; Measles; Pyelonephritis (2002); S/P colonoscopy (2010); Thyroid disease; Uterine fibroid (1986); Uterine fibroid; and Varicella. She also has no past medical history of Addisons disease (CMS-HCC); Adrenal disorder (CMS-HCC); Anxiety; Arrhythmia; ASTHMA; Cancer (CMS-HCC); CATARACT; CHF (congestive heart failure) (CMS-HCC); Clotting disorder (CMS-HCC); COPD; Cushings syndrome (CMS-HCC); Depression; EMPHYSEMA; Glaucoma; Goiter; Heart attack; Hypertension; IBD (inflammatory bowel disease); Meningitis; Migraine; Muscle disorder; OSTEOPOROSIS; Parathyroid disorder (CMS-HCC); Pituitary disease (CMS-HCC); Seizure (CMS-HCC); Stroke (CMS-HCC); Substance abuse; Tuberculosis; Ulcer (CMS-HCC); or Urinary tract infection, site not specified.    I  "personally reviewed patient's problem list, allergies, medications, family hx, social hx with patient and update EPIC.     REVIEW OF SYSTEMS:  CONSTITUTIONAL:  Denies night sweats, fatigue, malaise, lethargy, fever or chills.  RESPIRATORY:  Denies cough, wheeze, hemoptysis, or shortness of breath.  CARDIOVASCULAR:  Denies chest pains, palpitations, pedal edema     Objective:     Blood pressure 122/70, pulse 87, temperature 36.8 °C (98.3 °F), height 1.6 m (5' 3\"), weight 62.1 kg (137 lb), SpO2 95 %. Body mass index is 24.27 kg/m².    Physical Exam:  Constitutional: awake, alert, in no distress.  Skin: Warm, dry, good turgor, no rashes, bruises, ulcers in visible areas.  - mild erythema of cheeks, nose, and forehead  Eye: conjunctiva clear, lids neg for edema or lesions.  Neck: Trachea midline, no masses, no thyromegaly. No cervical or supraclavicular lymphadenopathy  Respiratory: Unlabored respiratory effort, lungs clear to auscultation, no wheezes, no rhonchi, no rales.  Cardiovascular: Normal S1, S2, no murmur, no pedal edema.  Psych: Oriented x3, affect and mood wnl, intact judgement and insight.     Monofilament testing with a 10 gram force: sensation intact: intact bilaterally  Visual Inspection: Feet without maceration, ulcers, fissures.  Pedal pulses: intact bilaterally    Assessment and Plan:   The following treatment plan was discussed    1. Type 2 diabetes mellitus without complication, without long-term current use of insulin (HCC)  Chronic, in good control, A1C 6.9, lost 3 lbs since last visit from diet and exercise. Plans:   - metFORMIN ER (GLUCOPHAGE XR) 500 MG TABLET SR 24 HR; Take 1 Tab by mouth every day.  Dispense: 180 Tab; Refill: 1  - MICROALBUMIN CREAT RATIO URINE (CLINIC COLLECT); Future  - Discussed dietary modification, exercise, weight loss  - Annual eye exam  - Regular foot exam  - Side effects of all medications discussed with patient  - Follow up in 3 months.    2. Hypothyroidism, " unspecified type  Chronic, well controlled with Levothyroxine 75 mcg, will continue.     3. Uterine leiomyoma, unspecified location  Chronic, asymptomatic, will monitor.     4. Atrophic vaginitis  Chronic, diagnosed by Dr. Clancy, vaginal estrogen prescribed but pt does not know how to use it. It appears that she does have persistent symptoms of atrophic vaginitis.   Instructions of how to use vaginal estrogen provided.     5. Rosacea  - metronidazole (METROCREAM) 0.75 % cream; Apply 1 Application to affected area(s) 2 times a day.  Dispense: 1 Tube; Refill: 1  - preventive measures discussed with patient.       Jasmin Hall M.D.      Followup: Return in about 3 months (around 7/19/2018) for Diabetes.    Please note that this dictation was created using voice recognition software. I have made every reasonable attempt to correct obvious errors, but I expect that there are errors of grammar and possibly content that I did not discover before finalizing the note.

## 2018-07-19 ENCOUNTER — OFFICE VISIT (OUTPATIENT)
Dept: MEDICAL GROUP | Age: 58
End: 2018-07-19
Payer: COMMERCIAL

## 2018-07-19 VITALS
BODY MASS INDEX: 24.59 KG/M2 | DIASTOLIC BLOOD PRESSURE: 70 MMHG | TEMPERATURE: 99 F | HEIGHT: 63 IN | WEIGHT: 138.8 LBS | HEART RATE: 78 BPM | SYSTOLIC BLOOD PRESSURE: 112 MMHG | RESPIRATION RATE: 16 BRPM | OXYGEN SATURATION: 98 %

## 2018-07-19 DIAGNOSIS — E78.00 PURE HYPERCHOLESTEROLEMIA: ICD-10-CM

## 2018-07-19 DIAGNOSIS — E03.9 HYPOTHYROIDISM, UNSPECIFIED TYPE: ICD-10-CM

## 2018-07-19 DIAGNOSIS — L30.9 DERMATITIS: ICD-10-CM

## 2018-07-19 DIAGNOSIS — E11.9 TYPE 2 DIABETES MELLITUS WITHOUT COMPLICATION, WITHOUT LONG-TERM CURRENT USE OF INSULIN (HCC): Primary | ICD-10-CM

## 2018-07-19 LAB
HBA1C MFR BLD: 7.1 % (ref ?–5.8)
INT CON NEG: NEGATIVE
INT CON POS: POSITIVE

## 2018-07-19 PROCEDURE — 99214 OFFICE O/P EST MOD 30 MIN: CPT | Performed by: FAMILY MEDICINE

## 2018-07-19 PROCEDURE — 83036 HEMOGLOBIN GLYCOSYLATED A1C: CPT | Performed by: FAMILY MEDICINE

## 2018-07-19 RX ORDER — METFORMIN HYDROCHLORIDE 500 MG/1
500 TABLET, EXTENDED RELEASE ORAL DAILY
Qty: 90 TAB | Refills: 1 | Status: SHIPPED | OUTPATIENT
Start: 2018-07-19 | End: 2019-01-22

## 2018-07-19 RX ORDER — LEVOTHYROXINE SODIUM 0.07 MG/1
75 TABLET ORAL
Qty: 90 TAB | Refills: 1 | Status: SHIPPED | OUTPATIENT
Start: 2018-07-19 | End: 2019-01-30 | Stop reason: SDUPTHER

## 2018-07-19 RX ORDER — LANCETS 30 GAUGE
EACH MISCELLANEOUS
Qty: 100 EACH | Refills: 2 | Status: SHIPPED | OUTPATIENT
Start: 2018-07-19 | End: 2018-10-13 | Stop reason: SDUPTHER

## 2018-07-19 RX ORDER — TRIAMCINOLONE ACETONIDE 1 MG/G
CREAM TOPICAL
Qty: 1 TUBE | Refills: 2 | Status: SHIPPED | OUTPATIENT
Start: 2018-07-19 | End: 2023-02-10

## 2018-07-20 NOTE — PROGRESS NOTES
"Subjective:   CC: Diabetes follow-up    HPI:     Shona Gacria is a 57 y.o. female, established patient of the clinic, presents with the following concerns:     1. Type 2 diabetes mellitus without complication, without long-term current use of insulin (HCC)  Chronic, controlled with metformin 500 mg daily, her last A1c was 6.9.  Patient states that she has not been able to take metformin over but the past few months due to confusion over metformin ER vs. XR.  Patient states that she normally take metformin ER.  However, metformin XR was dispensed to her by her pharmacy couple months ago.  Patient decided not to take Metformin and tries to work on dietary modification instead. She did not contact the office for clarification.  She denies any visual changes, symptoms of polyneuropathy, concerning lesion on her feet, polyuria, dysuria    2. Dermatitis  Patient complaint of \"heat rash\" over the past few week.  She states that she develops small bumps on her shoulders and upper arms when she is he exposed to high temperature.  The rest is nontender, mildly pruritic and bothersome to patient.  Patient has been trying over-the-counter hydrocortisone with moderate relief.  She denies recent changes of medication, body hygiene products, lotion, or moisturizers.    3. Hypothyroidism, unspecified type  Chronic, currently taking levothyroxine 75 mcg daily, normal TSH in April 2018.    4. Pure hypercholesterolemia_diet controlled  Chronic, diet-controlled, resistant to medication.  Patient is active, but does not exercise regularly.  BMI 24.59    Current medicines (including changes today)  Current Outpatient Prescriptions   Medication Sig Dispense Refill   • triamcinolone acetonide (KENALOG) 0.1 % Cream Apply to affected area twice daily 1 Tube 2   • glucose blood (ACCU-CHEK COMPACT PLUS) strip 100 Strips by Other route every day. 100 Strip 3   • Lancets Misc OneTouch UltraSoft  lancets # 90, RF 3 Sig: use daily " and prn ssx high or low sugar two times a day. #100 RF x 3 100 Each 2   • metFORMIN ER (GLUCOPHAGE XR) 500 MG TABLET SR 24 HR Take 1 Tab by mouth every day. 90 Tab 1   • levothyroxine (SYNTHROID) 75 MCG Tab Take 1 Tab by mouth every day. 90 Tab 1   • metronidazole (METROCREAM) 0.75 % cream Apply 1 Application to affected area(s) 2 times a day. 1 Tube 1   • Probiotic Product (PROBIOTIC ADVANCED PO) Take  by mouth.     • Omega-3 Fatty Acids (OMEGA 3 PO) Take  by mouth.     • vitamin D (CHOLECALCIFEROL) 1000 UNIT TABS Take 2,000 Units by mouth every day.       No current facility-administered medications for this visit.      She  has a past medical history of Allergy; Anemia; Blood transfusion (1986); Diabetes mellitus type II (4/18/2011); Dyslipidemia (8/28/2013); GERD (gastroesophageal reflux disease); Headache(784.0); Heart murmur; Measles; Pyelonephritis (2002); S/P colonoscopy (2010); Thyroid disease; Uterine fibroid (1986); Uterine fibroid; and Varicella. She also has no past medical history of Addisons disease ([facility]); Adrenal disorder ([facility]); Anxiety; Arrhythmia; ASTHMA; Cancer ([facility]); CATARACT; CHF (congestive heart failure) ([facility]); Clotting disorder ([facility]); COPD; Cushings syndrome ([facility]); Depression; EMPHYSEMA; Glaucoma; Goiter; Heart attack ([facility]); Hypertension; IBD (inflammatory bowel disease); Meningitis; Migraine; Muscle disorder; OSTEOPOROSIS; Parathyroid disorder ([facility]); Pituitary disease ([facility]); Seizure ([facility]); Stroke ([facility]); Substance abuse; Tuberculosis; Ulcer; or Urinary tract infection, site not specified.    I personally reviewed patient's problem list, allergies, medications, family hx, social hx with patient and update [facility].     REVIEW OF SYSTEMS:  CONSTITUTIONAL:  Denies night sweats, fatigue, malaise, lethargy, fever or chills.  RESPIRATORY:  Denies cough, wheeze, hemoptysis, or shortness of breath.  CARDIOVASCULAR:  Denies chest pains, palpitations, pedal edema     Objective:     Blood pressure 112/70,  "pulse 78, temperature 37.2 °C (99 °F), resp. rate 16, height 1.6 m (5' 3\"), weight 63 kg (138 lb 12.8 oz), SpO2 98 %, not currently breastfeeding. Body mass index is 24.59 kg/m².    Physical Exam:  Constitutional: awake, alert, in no distress.  Skin: Warm, dry, good turgor, no rashes, bruises, ulcers in visible areas.  - mildly erythematous papular rash on upper arms and shoulders.   Eye: conjunctiva clear, lids neg for edema or lesions.  Neck: Trachea midline, no masses, no thyromegaly. No cervical or supraclavicular lymphadenopathy  Respiratory: Unlabored respiratory effort, lungs clear to auscultation, no wheezes, no rales.  Cardiovascular: Normal S1, S2, no murmur, no pedal edema.  Psych: Oriented x3, affect and mood wnl, intact judgement and insight.       Assessment and Plan:   The following treatment plan was discussed    1. Type 2 diabetes mellitus without complication, without long-term current use of insulin (HCC)  Chronic, A1c 7.1 today, appears to control w/o Metformin. However, pt is interested in restarting Metformin to bring A1C to less than 7. Plans:   - glucose blood (ACCU-CHEK COMPACT PLUS) strip; 100 Strips by Other route every day.  Dispense: 100 Strip; Refill: 3  - Lancets Misc; OneTouch UltraSoft  lancets # 90, RF 3 Sig: use daily and prn ssx high or low sugar two times a day. #100 RF x 3  Dispense: 100 Each; Refill: 2  - metFORMIN ER (GLUCOPHAGE XR) 500 MG TABLET SR 24 HR; Take 1 Tab by mouth every day.  Dispense: 90 Tab; Refill: 1  - HEMOGLOBIN A1C; Future  - MICROALBUMIN CREAT RATIO URINE; Future  - COMP METABOLIC PANEL; Future  - Discussed dietary modification, exercise, weight loss  - Annual eye exam  - Regular foot exam  - Discussed prevention and management of hypoglycemia  - Discussed vaccines recommendations: PPSV 23 and hepatitis B.   - Side effects of all medications discussed with patient  - Follow up in 6 months.    2. Dermatitis  - triamcinolone acetonide (KENALOG) 0.1 % Cream; " Apply to affected area twice daily  Dispense: 1 Tube; Refill: 2    3. Hypothyroidism, unspecified type  Chronic, well controlled with levothyroxine 75 mcg daily, normal TSH in April 2018, will continue levothyroxine at current dose.  - levothyroxine (SYNTHROID) 75 MCG Tab; Take 1 Tab by mouth every day.  Dispense: 90 Tab; Refill: 1  - TSH; Future    4. Pure hypercholesterolemia_diet controlled  Chronic, diet-controlled, resistant to medication.  Plans:  - Pt was counseled on dietary modification, weight loss, smoking cessation, and avoidance of excessive alcohol consumption.    - Recommended moderate intensity exercise at least 30 minutes per day x 5 days per week.  - LIPID PROFILE; Future      Ly ALEJANDRA Hall M.D.      Followup: Return in about 6 months (around 1/19/2019) for Annual wellness visit.    Please note that this dictation was created using voice recognition software. I have made every reasonable attempt to correct obvious errors, but I expect that there are errors of grammar and possibly content that I did not discover before finalizing the note.

## 2018-08-09 ENCOUNTER — HOSPITAL ENCOUNTER (OUTPATIENT)
Dept: LAB | Facility: MEDICAL CENTER | Age: 58
End: 2018-08-09
Attending: SPECIALIST
Payer: COMMERCIAL

## 2018-08-09 PROCEDURE — 88175 CYTOPATH C/V AUTO FLUID REDO: CPT

## 2018-08-09 PROCEDURE — 87624 HPV HI-RISK TYP POOLED RSLT: CPT

## 2018-08-10 LAB
CYTOLOGY REG CYTOL: NORMAL
HPV HR 12 DNA CVX QL NAA+PROBE: NEGATIVE
HPV16 DNA SPEC QL NAA+PROBE: NEGATIVE
HPV18 DNA SPEC QL NAA+PROBE: NEGATIVE
SPECIMEN SOURCE: NORMAL

## 2018-08-26 ENCOUNTER — OFFICE VISIT (OUTPATIENT)
Dept: URGENT CARE | Facility: CLINIC | Age: 58
End: 2018-08-26
Payer: COMMERCIAL

## 2018-08-26 ENCOUNTER — HOSPITAL ENCOUNTER (OUTPATIENT)
Facility: MEDICAL CENTER | Age: 58
End: 2018-08-26
Attending: FAMILY MEDICINE
Payer: COMMERCIAL

## 2018-08-26 VITALS
SYSTOLIC BLOOD PRESSURE: 124 MMHG | OXYGEN SATURATION: 97 % | RESPIRATION RATE: 16 BRPM | DIASTOLIC BLOOD PRESSURE: 80 MMHG | BODY MASS INDEX: 22.53 KG/M2 | TEMPERATURE: 97.7 F | HEIGHT: 64 IN | HEART RATE: 64 BPM | WEIGHT: 132 LBS

## 2018-08-26 DIAGNOSIS — R30.0 DYSURIA: ICD-10-CM

## 2018-08-26 LAB
APPEARANCE UR: CLEAR
BILIRUB UR STRIP-MCNC: NORMAL MG/DL
COLOR UR AUTO: NORMAL
GLUCOSE UR STRIP.AUTO-MCNC: NORMAL MG/DL
KETONES UR STRIP.AUTO-MCNC: NORMAL MG/DL
LEUKOCYTE ESTERASE UR QL STRIP.AUTO: NORMAL
NITRITE UR QL STRIP.AUTO: NORMAL
PH UR STRIP.AUTO: 7.5 [PH] (ref 5–8)
PROT UR QL STRIP: NORMAL MG/DL
RBC UR QL AUTO: NORMAL
SP GR UR STRIP.AUTO: 1
UROBILINOGEN UR STRIP-MCNC: NORMAL MG/DL

## 2018-08-26 PROCEDURE — 81002 URINALYSIS NONAUTO W/O SCOPE: CPT | Performed by: FAMILY MEDICINE

## 2018-08-26 PROCEDURE — 99214 OFFICE O/P EST MOD 30 MIN: CPT | Performed by: FAMILY MEDICINE

## 2018-08-26 PROCEDURE — 87086 URINE CULTURE/COLONY COUNT: CPT

## 2018-08-26 RX ORDER — SULFAMETHOXAZOLE AND TRIMETHOPRIM 800; 160 MG/1; MG/1
1 TABLET ORAL EVERY 12 HOURS
Qty: 14 TAB | Refills: 0 | Status: SHIPPED | OUTPATIENT
Start: 2018-08-26 | End: 2018-09-02

## 2018-08-26 RX ORDER — PHENAZOPYRIDINE HYDROCHLORIDE 200 MG/1
200 TABLET, FILM COATED ORAL 3 TIMES DAILY
Qty: 6 TAB | Refills: 0 | Status: SHIPPED | OUTPATIENT
Start: 2018-08-26 | End: 2018-08-28

## 2018-08-26 ASSESSMENT — ENCOUNTER SYMPTOMS
FEVER: 0
NAUSEA: 0
CHILLS: 0
VOMITING: 0
DIZZINESS: 0
FOCAL WEAKNESS: 0

## 2018-08-28 LAB
BACTERIA UR CULT: NORMAL
SIGNIFICANT IND 70042: NORMAL
SITE SITE: NORMAL
SOURCE SOURCE: NORMAL

## 2018-10-13 DIAGNOSIS — E11.9 TYPE 2 DIABETES MELLITUS WITHOUT COMPLICATION, WITHOUT LONG-TERM CURRENT USE OF INSULIN (HCC): ICD-10-CM

## 2018-10-15 RX ORDER — LANCETS
EACH MISCELLANEOUS
Qty: 100 EACH | Refills: 2 | Status: SHIPPED | OUTPATIENT
Start: 2018-10-15 | End: 2019-02-10 | Stop reason: SDUPTHER

## 2018-12-21 NOTE — PROGRESS NOTES
Pre-Operative Diagnosis: HYPERTROPHY OF TONSILS AND ADENOIDS, OBSTRUCTIVE SLEEP APNEA     Post-Operative Diagnosis: HYPERTROPHY OF TONSILS AND ADENOIDS, OBSTRUCTIVE SLEEP APNEA      Procedure Performed:   Procedure(s):  BILATERAL TONSILLECTOMY AND ADENOIDE Subjective:      Shona Garcia is a 58 y.o. female who presents with Pain (Started last saturday, pelvic pain, low back pain, dysuria)    Chief Complaint   Patient presents with   • Pain     Started last saturday, pelvic pain, low back pain, dysuria        - This is a very pleasant 58 y.o. female with complaints of urinary freq/burn x 3 days, no NVFC           ALLERGIES:  Pcn [penicillins]     PMH:  Past Medical History:   Diagnosis Date   • Allergy    • Anemia    • Blood transfusion 1986    Brother's blood   • Diabetes mellitus type II 4/18/2011   • Dyslipidemia 8/28/2013   • GERD (gastroesophageal reflux disease)     Off and on with certain foods.   • Headache(784.0)    • Heart murmur    • Measles    • Pyelonephritis 2002   • S/P colonoscopy 2010    negative-repeat 10 years   • Thyroid disease    • Uterine fibroid 1986   • Uterine fibroid     has seen gynecology   • Varicella         MEDS:    Current Outpatient Prescriptions:   •  sulfamethoxazole-trimethoprim (BACTRIM DS) 800-160 MG tablet, Take 1 Tab by mouth every 12 hours for 7 days., Disp: 14 Tab, Rfl: 0  •  phenazopyridine (PYRIDIUM) 200 MG Tab, Take 1 Tab by mouth 3 times a day for 2 days., Disp: 6 Tab, Rfl: 0  •  levothyroxine (SYNTHROID) 75 MCG Tab, Take 1 Tab by mouth every day., Disp: 90 Tab, Rfl: 1  •  triamcinolone acetonide (KENALOG) 0.1 % Cream, Apply to affected area twice daily, Disp: 1 Tube, Rfl: 2  •  glucose blood (ACCU-CHEK COMPACT PLUS) strip, 100 Strips by Other route every day., Disp: 100 Strip, Rfl: 3  •  Lancets Misc, OneTouch UltraSoft  lancets # 90, RF 3 Sig: use daily and prn ssx high or low sugar two times a day. #100 RF x 3, Disp: 100 Each, Rfl: 2  •  metFORMIN ER (GLUCOPHAGE XR) 500 MG TABLET SR 24 HR, Take 1 Tab by mouth every day. (Patient not taking: Reported on 8/26/2018), Disp: 90 Tab, Rfl: 1  •  metronidazole (METROCREAM) 0.75 % cream, Apply 1 Application to affected area(s) 2 times a day., Disp: 1 Tube, Rfl:  "1  •  Probiotic Product (PROBIOTIC ADVANCED PO), Take  by mouth., Disp: , Rfl:   •  Omega-3 Fatty Acids (OMEGA 3 PO), Take  by mouth., Disp: , Rfl:   •  vitamin D (CHOLECALCIFEROL) 1000 UNIT TABS, Take 2,000 Units by mouth every day., Disp: , Rfl:     ** I have documented what I find to be significant in regards to past medical, social, family and surgical history  in my HPI or under PMH/PSH/FH review section, otherwise it is contributory **           HPI    Review of Systems   Constitutional: Negative for chills and fever.   Gastrointestinal: Negative for nausea and vomiting.   Genitourinary: Positive for dysuria and frequency.   Neurological: Negative for dizziness and focal weakness.          Objective:     /80   Pulse 64   Temp 36.5 °C (97.7 °F)   Resp 16   Ht 1.626 m (5' 4\")   Wt 59.9 kg (132 lb)   SpO2 97%   Breastfeeding? No   BMI 22.66 kg/m²      Physical Exam   Constitutional: She appears well-developed. No distress.   HENT:   Head: Normocephalic and atraumatic.   Cardiovascular: Regular rhythm.    No murmur heard.  Pulmonary/Chest: Effort normal. No respiratory distress.   Abdominal: Soft. She exhibits no distension. There is no tenderness. There is no rebound and no guarding.   Neurological: She is alert. She exhibits normal muscle tone.   Skin: Skin is warm and dry.   Psychiatric: She has a normal mood and affect. Judgment normal.   Nursing note and vitals reviewed.              Assessment/Plan:         1. Dysuria  POCT Urinalysis    Urine Culture    sulfamethoxazole-trimethoprim (BACTRIM DS) 800-160 MG tablet    phenazopyridine (PYRIDIUM) 200 MG Tab             Dx & d/c instructions discussed w/ patient and/or family members. Follow up w/ Prvt Dr in 3-4 days, sooner if needed,  ER if worse and UC/PCP unavailable.        Possible side effects (i.e. Rash, GI upset/constipation, sedation, elevation of BP or sugars) of any medications given discussed.                "

## 2019-01-18 ENCOUNTER — HOSPITAL ENCOUNTER (OUTPATIENT)
Dept: LAB | Facility: MEDICAL CENTER | Age: 59
End: 2019-01-18
Attending: FAMILY MEDICINE
Payer: COMMERCIAL

## 2019-01-18 DIAGNOSIS — E03.9 HYPOTHYROIDISM, UNSPECIFIED TYPE: ICD-10-CM

## 2019-01-18 DIAGNOSIS — E78.00 PURE HYPERCHOLESTEROLEMIA: ICD-10-CM

## 2019-01-18 DIAGNOSIS — E11.9 TYPE 2 DIABETES MELLITUS WITHOUT COMPLICATION, WITHOUT LONG-TERM CURRENT USE OF INSULIN (HCC): ICD-10-CM

## 2019-01-18 LAB
ALBUMIN SERPL BCP-MCNC: 4.2 G/DL (ref 3.2–4.9)
ALBUMIN/GLOB SERPL: 1.6 G/DL
ALP SERPL-CCNC: 56 U/L (ref 30–99)
ALT SERPL-CCNC: 18 U/L (ref 2–50)
ANION GAP SERPL CALC-SCNC: 8 MMOL/L (ref 0–11.9)
AST SERPL-CCNC: 16 U/L (ref 12–45)
BILIRUB SERPL-MCNC: 0.4 MG/DL (ref 0.1–1.5)
BUN SERPL-MCNC: 12 MG/DL (ref 8–22)
CALCIUM SERPL-MCNC: 9.6 MG/DL (ref 8.5–10.5)
CHLORIDE SERPL-SCNC: 101 MMOL/L (ref 96–112)
CHOLEST SERPL-MCNC: 150 MG/DL (ref 100–199)
CO2 SERPL-SCNC: 27 MMOL/L (ref 20–33)
CREAT SERPL-MCNC: 0.59 MG/DL (ref 0.5–1.4)
CREAT UR-MCNC: 29.7 MG/DL
EST. AVERAGE GLUCOSE BLD GHB EST-MCNC: 160 MG/DL
FASTING STATUS PATIENT QL REPORTED: NORMAL
GLOBULIN SER CALC-MCNC: 2.7 G/DL (ref 1.9–3.5)
GLUCOSE SERPL-MCNC: 150 MG/DL (ref 65–99)
HBA1C MFR BLD: 7.2 % (ref 0–5.6)
HDLC SERPL-MCNC: 42 MG/DL
LDLC SERPL CALC-MCNC: 75 MG/DL
MICROALBUMIN UR-MCNC: <0.7 MG/DL
MICROALBUMIN/CREAT UR: NORMAL MG/G (ref 0–30)
POTASSIUM SERPL-SCNC: 4.2 MMOL/L (ref 3.6–5.5)
PROT SERPL-MCNC: 6.9 G/DL (ref 6–8.2)
SODIUM SERPL-SCNC: 136 MMOL/L (ref 135–145)
TRIGL SERPL-MCNC: 167 MG/DL (ref 0–149)
TSH SERPL DL<=0.005 MIU/L-ACNC: 3.27 UIU/ML (ref 0.38–5.33)

## 2019-01-18 PROCEDURE — 80053 COMPREHEN METABOLIC PANEL: CPT

## 2019-01-18 PROCEDURE — 82043 UR ALBUMIN QUANTITATIVE: CPT

## 2019-01-18 PROCEDURE — 82570 ASSAY OF URINE CREATININE: CPT

## 2019-01-18 PROCEDURE — 83036 HEMOGLOBIN GLYCOSYLATED A1C: CPT

## 2019-01-18 PROCEDURE — 80061 LIPID PANEL: CPT

## 2019-01-18 PROCEDURE — 36415 COLL VENOUS BLD VENIPUNCTURE: CPT

## 2019-01-18 PROCEDURE — 84443 ASSAY THYROID STIM HORMONE: CPT

## 2019-01-22 ENCOUNTER — OFFICE VISIT (OUTPATIENT)
Dept: MEDICAL GROUP | Age: 59
End: 2019-01-22
Payer: COMMERCIAL

## 2019-01-22 VITALS
BODY MASS INDEX: 23.63 KG/M2 | HEIGHT: 64 IN | HEART RATE: 73 BPM | TEMPERATURE: 97.7 F | OXYGEN SATURATION: 98 % | DIASTOLIC BLOOD PRESSURE: 80 MMHG | WEIGHT: 138.4 LBS | SYSTOLIC BLOOD PRESSURE: 130 MMHG

## 2019-01-22 DIAGNOSIS — E11.9 TYPE 2 DIABETES MELLITUS WITHOUT COMPLICATION, WITHOUT LONG-TERM CURRENT USE OF INSULIN (HCC): ICD-10-CM

## 2019-01-22 DIAGNOSIS — E78.00 PURE HYPERCHOLESTEROLEMIA: ICD-10-CM

## 2019-01-22 DIAGNOSIS — Z00.00 PE (PHYSICAL EXAM), ANNUAL: Primary | ICD-10-CM

## 2019-01-22 DIAGNOSIS — D25.9 UTERINE LEIOMYOMA, UNSPECIFIED LOCATION: ICD-10-CM

## 2019-01-22 DIAGNOSIS — L71.9 ROSACEA: ICD-10-CM

## 2019-01-22 DIAGNOSIS — E03.9 HYPOTHYROIDISM, UNSPECIFIED TYPE: ICD-10-CM

## 2019-01-22 PROBLEM — N95.2 ATROPHIC VAGINITIS: Status: RESOLVED | Noted: 2018-04-19 | Resolved: 2019-01-22

## 2019-01-22 PROCEDURE — 99396 PREV VISIT EST AGE 40-64: CPT | Performed by: FAMILY MEDICINE

## 2019-01-22 RX ORDER — INFLUENZA A VIRUS A/BRISBANE/02/2018 IVR-190 (H1N1) ANTIGEN (PROPIOLACTONE INACTIVATED), INFLUENZA A VIRUS A/KANSAS/14/2017 X-327 (H3N2) ANTIGEN (PROPIOLACTONE INACTIVATED), INFLUENZA B VIRUS B/MARYLAND/15/2016 ANTIGEN (PROPIOLACTONE INACTIVATED), INFLUENZA B VIRUS B/PHUKET/3073/2013 BVR-1B ANTIGEN (PROPIOLACTONE INACTIVATED) 15; 15; 15; 15 UG/.5ML; UG/.5ML; UG/.5ML; UG/.5ML
INJECTION, SUSPENSION INTRAMUSCULAR
Refills: 0 | COMMUNITY
Start: 2018-11-05 | End: 2019-10-22

## 2019-01-22 ASSESSMENT — PATIENT HEALTH QUESTIONNAIRE - PHQ9: CLINICAL INTERPRETATION OF PHQ2 SCORE: 0

## 2019-01-23 NOTE — PROGRESS NOTES
Subjective:   CC: Annual PE    HPI:     Shona Garcia is a 58 y.o. female, established patient of the clinic, presents with the following concerns:     Patient is feeling well today.  She does not have any specific concerns.  She has history of diabetes, hyperlipidemia, hypothyroidism.  Patient was taking metformin for diabetes.  However, she discontinued after couple weeks due to concerns of vertigo.  Patient has been working on dietary modification and increase physical activity.  Her A1c today is 7.2, which is essentially the same as before.  She denies any visual changes, concerning lesion on her feet, symptoms of polyneuropathy.  Patient continues to decline pharmacotherapy today.  She is interested in lifestyle modification to control her blood sugar.  Patient does not take medication for hyperlipidemia due to fear of side effects.  Patient is on 75 mcg of levothyroxine daily.  Patient has history of rosacea, which is under good control with metronidazole cream.  Patient also has history of uterine fibroid per previous ultrasound.  She is postmenopausal and asymptomatic.    Current medicines (including changes today)  Current Outpatient Prescriptions   Medication Sig Dispense Refill   • AFLURIA QUADRIVALENT 0.5 ML Suspension Prefilled Syringe injection TO BE ADMINISTERED BY PHARMACIST FOR IMMUNIZATION  0   • ONE TOUCH ULTRASOFT LANCETS Misc Check your blood sugar once daily 100 Each 2   • triamcinolone acetonide (KENALOG) 0.1 % Cream Apply to affected area twice daily 1 Tube 2   • glucose blood (ACCU-CHEK COMPACT PLUS) strip 100 Strips by Other route every day. 100 Strip 3   • levothyroxine (SYNTHROID) 75 MCG Tab Take 1 Tab by mouth every day. 90 Tab 1   • metronidazole (METROCREAM) 0.75 % cream Apply 1 Application to affected area(s) 2 times a day. 1 Tube 1   • Probiotic Product (PROBIOTIC ADVANCED PO) Take  by mouth.     • Omega-3 Fatty Acids (OMEGA 3 PO) Take  by mouth.     • vitamin D  "(CHOLECALCIFEROL) 1000 UNIT TABS Take 2,000 Units by mouth every day.       No current facility-administered medications for this visit.      She  has a past medical history of Allergy; Anemia; Blood transfusion (1986); Diabetes mellitus type II (4/18/2011); Dyslipidemia (8/28/2013); GERD (gastroesophageal reflux disease); Headache(784.0); Heart murmur; Measles; Pyelonephritis (2002); S/P colonoscopy (2010); Thyroid disease; Uterine fibroid (1986); Uterine fibroid; and Varicella. She also has no past medical history of Addisons disease (HCC); Adrenal disorder (HCC); Anxiety; Arrhythmia; ASTHMA; Cancer (HCC); CATARACT; CHF (congestive heart failure) ([facility]); Clotting disorder (HCC); COPD; Cushings syndrome (HCC); Depression; EMPHYSEMA; Glaucoma; Goiter; Heart attack (HCC); Hypertension; IBD (inflammatory bowel disease); Meningitis; Migraine; Muscle disorder; OSTEOPOROSIS; Parathyroid disorder (HCC); Pituitary disease (HCC); Seizure (HCC); Stroke ([facility]); Substance abuse (HCC); Tuberculosis; Ulcer; or Urinary tract infection, site not specified.    I personally reviewed patient's problem list, allergies, medications, family hx, social hx with patient and update EPIC.     REVIEW OF SYSTEMS:  CONSTITUTIONAL:  Denies night sweats, fatigue, malaise, lethargy, fever or chills.  RESPIRATORY:  Denies cough, wheeze, hemoptysis, or shortness of breath.  CARDIOVASCULAR:  Denies chest pains, palpitations, pedal edema     Objective:     Blood pressure 130/80, pulse 73, temperature 36.5 °C (97.7 °F), temperature source Temporal, height 1.626 m (5' 4\"), weight 62.8 kg (138 lb 6.4 oz), SpO2 98 %, not currently breastfeeding. Body mass index is 23.76 kg/m².    Physical Exam:  Constitutional: awake, alert, in no distress.  Skin: Warm, dry, good turgor, no rashes, bruises, ulcers in visible areas.  Eye: conjunctiva clear, lids neg for edema or lesions.  ENMT: TM and auditory canals wnl. Oral and nasal mucosa wnl. Lips without lesions, good " dentition, oropharynx clear.  Neck: Trachea midline, no masses, no thyromegaly. No cervical or supraclavicular lymphadenopathy  Respiratory: Unlabored respiratory effort, lungs clear to auscultation, no wheezes, no rales.  Cardiovascular: Normal S1, S2, no murmur, no pedal edema.  Psych: Oriented x3, affect and mood wnl, intact judgement and insight.       Assessment and Plan:   The following treatment plan was discussed    1. Type 2 diabetes mellitus without complication, without long-term current use of insulin (HCC)  Chronic, A1c was 7.2 today.  Patient was taking metformin for several weeks but discontinued due to side effects.  Patient declined pharmacotherapy today.  She is interested in lifestyle modification to control her blood sugar.  Plans:  - Discussed dietary modification, exercise, weight loss  - Annual eye exam  - Regular foot exam  - Discussed vaccines recommendations: PPSV 23 and hepatitis B.   - Side effects of all medications discussed with patient  - Follow up in 3 months.     2. Pure hypercholesterolemia_diet controlled  Chronic, declined pharmacotherapy, recommended lifestyle modification and weight loss.    3. Hypothyroidism, unspecified type  Chronic, controlled with levothyroxine 75 mcg daily normal TSH in January 2018.  Patient was advised to continue metformin.    4. Uterine leiomyoma, unspecified location  Chronic, postmenopausal, asymptomatic, will continue routine monitoring.    5. Rosacea  Chronic, controlled with metronidazole cream, will continue.    6. PE (physical exam), annual  - pt is counseled on diet, exercise, vitamin supplement, mental health, sleep, stress  - discussed screening guidelines for pap, STD, mammogram, colonoscopy and vaccine recommendations.        Jasmin Hall M.D.      Followup: Return in about 3 months (around 4/22/2019) for Diabetes.    Please note that this dictation was created using voice recognition software. I have made every reasonable attempt to  correct obvious errors, but I expect that there are errors of grammar and possibly content that I did not discover before finalizing the note.

## 2019-01-30 DIAGNOSIS — E03.9 HYPOTHYROIDISM, UNSPECIFIED TYPE: ICD-10-CM

## 2019-01-31 RX ORDER — LEVOTHYROXINE SODIUM 0.07 MG/1
TABLET ORAL
Qty: 90 TAB | Refills: 1 | Status: SHIPPED | OUTPATIENT
Start: 2019-01-31 | End: 2019-04-23 | Stop reason: SDUPTHER

## 2019-02-07 ENCOUNTER — HOSPITAL ENCOUNTER (OUTPATIENT)
Dept: RADIOLOGY | Facility: MEDICAL CENTER | Age: 59
End: 2019-02-07
Attending: FAMILY MEDICINE
Payer: COMMERCIAL

## 2019-02-07 DIAGNOSIS — Z12.39 SCREENING BREAST EXAMINATION: ICD-10-CM

## 2019-02-07 PROCEDURE — 77063 BREAST TOMOSYNTHESIS BI: CPT

## 2019-02-10 DIAGNOSIS — E11.9 TYPE 2 DIABETES MELLITUS WITHOUT COMPLICATION, WITHOUT LONG-TERM CURRENT USE OF INSULIN (HCC): ICD-10-CM

## 2019-02-12 RX ORDER — LANCETS
EACH MISCELLANEOUS
Qty: 100 EACH | Refills: 2 | Status: SHIPPED | OUTPATIENT
Start: 2019-02-12 | End: 2019-06-10 | Stop reason: SDUPTHER

## 2019-04-23 ENCOUNTER — OFFICE VISIT (OUTPATIENT)
Dept: MEDICAL GROUP | Age: 59
End: 2019-04-23
Payer: COMMERCIAL

## 2019-04-23 VITALS
HEIGHT: 64 IN | HEART RATE: 95 BPM | WEIGHT: 138 LBS | TEMPERATURE: 98 F | OXYGEN SATURATION: 97 % | DIASTOLIC BLOOD PRESSURE: 80 MMHG | SYSTOLIC BLOOD PRESSURE: 122 MMHG | BODY MASS INDEX: 23.56 KG/M2

## 2019-04-23 DIAGNOSIS — E78.00 PURE HYPERCHOLESTEROLEMIA: ICD-10-CM

## 2019-04-23 DIAGNOSIS — E03.9 HYPOTHYROIDISM, UNSPECIFIED TYPE: ICD-10-CM

## 2019-04-23 DIAGNOSIS — E11.9 TYPE 2 DIABETES MELLITUS WITHOUT COMPLICATION, WITHOUT LONG-TERM CURRENT USE OF INSULIN (HCC): Primary | ICD-10-CM

## 2019-04-23 DIAGNOSIS — J30.9 ALLERGIC RHINITIS, UNSPECIFIED SEASONALITY, UNSPECIFIED TRIGGER: ICD-10-CM

## 2019-04-23 LAB
HBA1C MFR BLD: 7 % (ref 0–5.6)
INT CON NEG: ABNORMAL
INT CON POS: ABNORMAL

## 2019-04-23 PROCEDURE — 83036 HEMOGLOBIN GLYCOSYLATED A1C: CPT | Performed by: FAMILY MEDICINE

## 2019-04-23 PROCEDURE — 99214 OFFICE O/P EST MOD 30 MIN: CPT | Performed by: FAMILY MEDICINE

## 2019-04-23 RX ORDER — MONTELUKAST SODIUM 10 MG/1
10 TABLET ORAL DAILY
Qty: 90 TAB | Refills: 1 | Status: SHIPPED | OUTPATIENT
Start: 2019-04-23 | End: 2020-05-15

## 2019-04-23 RX ORDER — LEVOTHYROXINE SODIUM 0.07 MG/1
75 TABLET ORAL
Qty: 90 TAB | Refills: 1 | Status: SHIPPED | OUTPATIENT
Start: 2019-04-23 | End: 2019-10-22 | Stop reason: SDUPTHER

## 2019-04-23 RX ORDER — TRIAMCINOLONE ACETONIDE 40 MG/ML
40 INJECTION, SUSPENSION INTRA-ARTICULAR; INTRAMUSCULAR ONCE
Status: COMPLETED | OUTPATIENT
Start: 2019-04-23 | End: 2019-04-23

## 2019-04-23 RX ORDER — AZELASTINE HYDROCHLORIDE, FLUTICASONE PROPIONATE 137; 50 UG/1; UG/1
1 SPRAY, METERED NASAL 2 TIMES DAILY
Qty: 1 BOTTLE | Refills: 2 | Status: SHIPPED | OUTPATIENT
Start: 2019-04-23 | End: 2020-05-15

## 2019-04-23 RX ADMIN — TRIAMCINOLONE ACETONIDE 40 MG: 40 INJECTION, SUSPENSION INTRA-ARTICULAR; INTRAMUSCULAR at 17:37

## 2019-04-23 NOTE — PROGRESS NOTES
Subjective:   CC: DM f/u     HPI:     Shona Garcia is a 58 y.o. female who is an established patient of the clinic, presents with the following concerns:     The patient has history of type II diabetes and is not taking medications for treatment at this time. She was previously on Metformin, but discontinued after a few weeks due to concerns of vertigo. A1C today was 7.0, which has improved from 7.2 in January 2019. She states she has not been eating healthy recently, but will resume healthy diet soon. She continues to decline diabetic treatment. She has not had a recent retinal exam.     In regards of her hypothyroidism, she is taking Levothyroxine 75mcg and has been tolerating this well without side effects.  Normal TSH in January 2019.    She has history of hypercholesterolemia, but not taking medications for it due to fear of side effects. She has been managing through self efforts by diet and exercise.      The patient has been experiencing intermittent headaches for the last few months that occur primarily when waking up in the morning.  Associated symptoms includes sinus pressure, nasal congestion.  She states symptoms initially occurred when she had flu-like symptoms, but the headache persisted afterwards when the flu-like symptoms resolved. She has been medicating with Advil, which helps to resolve the symptoms, but inquiring about medication prevention.      Current medicines (including changes today)  Current Outpatient Prescriptions   Medication Sig Dispense Refill   • levothyroxine (SYNTHROID) 75 MCG Tab Take 1 Tab by mouth every day. 90 Tab 1   • ONE TOUCH ULTRASOFT LANCETS Misc Check your blood sugar once daily 100 Each 2   • AFLURIA QUADRIVALENT 0.5 ML Suspension Prefilled Syringe injection TO BE ADMINISTERED BY PHARMACIST FOR IMMUNIZATION  0   • triamcinolone acetonide (KENALOG) 0.1 % Cream Apply to affected area twice daily 1 Tube 2   • glucose blood (ACCU-CHEK COMPACT PLUS) strip  "100 Strips by Other route every day. 100 Strip 3   • metronidazole (METROCREAM) 0.75 % cream Apply 1 Application to affected area(s) 2 times a day. 1 Tube 1   • Probiotic Product (PROBIOTIC ADVANCED PO) Take  by mouth.     • Omega-3 Fatty Acids (OMEGA 3 PO) Take  by mouth.     • vitamin D (CHOLECALCIFEROL) 1000 UNIT TABS Take 2,000 Units by mouth every day.       No current facility-administered medications for this visit.      She  has a past medical history of Allergy; Anemia; Blood transfusion (1986); Diabetes mellitus type II (4/18/2011); Dyslipidemia (8/28/2013); GERD (gastroesophageal reflux disease); Headache(784.0); Heart murmur; Measles; Pyelonephritis (2002); S/P colonoscopy (2010); Thyroid disease; Uterine fibroid (1986); Uterine fibroid; and Varicella. She also has no past medical history of Addisons disease (HCC); Adrenal disorder ([facility]); Anxiety; Arrhythmia; ASTHMA; CATARACT; CHF (congestive heart failure) ([facility]); Clotting disorder ([facility]); COPD; Cushings syndrome ([facility]); Depression; EMPHYSEMA; Glaucoma; Goiter; Heart attack (HCC); Hypertension; IBD (inflammatory bowel disease); Meningitis; Migraine; Muscle disorder; OSTEOPOROSIS; Parathyroid disorder ([facility]); Pituitary disease ([facility]); Seizure ([facility]); Stroke ([facility]); Substance abuse ([facility]); Tuberculosis; Ulcer; or Urinary tract infection, site not specified.    I personally reviewed patient's problem list, allergies, medications, family hx, social hx with patient and update [facility].     REVIEW OF SYSTEMS:  CONSTITUTIONAL:  Denies night sweats, fatigue, malaise, lethargy, fever or chills.  RESPIRATORY:  Denies cough, wheeze, hemoptysis, or shortness of breath.  CARDIOVASCULAR:  Denies chest pains, palpitations, pedal edema     Objective:     /80 (BP Location: Left arm, Patient Position: Sitting, BP Cuff Size: Adult)   Pulse 95   Temp 36.7 °C (98 °F) (Temporal)   Ht 1.626 m (5' 4\")   Wt 62.6 kg (138 lb)   SpO2 97%  Body mass index is 23.69 kg/m².    Physical " Exam:  Constitutional: awake, alert, in no distress.  Skin: Warm, dry, good turgor, no rashes, bruises, ulcers in visible areas.  Eye: conjunctiva clear, lids neg for edema or lesions.  ENMT: TM and auditory canals wnl. Severe bilateral turbinate hypertrophy, hyperemic oropharynx with moderate bilateral tonsillar hypertrophy. Lips without lesions, good dentition, oropharynx clear.  Neck: Trachea midline, no masses, no thyromegaly. No cervical or supraclavicular lymphadenopathy  Respiratory: Unlabored respiratory effort, lungs clear to auscultation, no wheezes, no rales.  Cardiovascular: Normal S1, S2, no murmur, no pedal edema.  Psych: Oriented x3, affect and mood wnl, intact judgement and insight.     Monofilament testing with a 10 gram force: sensation intact: intact bilaterally  Visual Inspection: Feet without maceration, ulcers, fissures.  Pedal pulses: intact bilaterally       Assessment and Plan:   The following treatment plan was discussed    1. Type 2 diabetes mellitus without complication, without long-term current use of insulin (HCC)  Chronic, diet controlled, A1c was 7.0, improving from 7.3 couple months ago.  Patient declines pharmacotherapy.  Plans:  - Discussed dietary modification, exercise, weight loss  - Annual eye exam  - Regular foot exam  - Discussed prevention and management of hypoglycemia  - Discussed vaccines recommendations: PPSV 23 and hepatitis B.   - Side effects of all medications discussed with patient  - Follow up in 6 months.   - Diabetic Monofilament Lower Extremity Exam  - POCT Hemoglobin A1C  - HEMOGLOBIN A1C; Future    2. Pure hypercholesterolemia_diet controlled, declines med  Chronic, diet controlled, declined pharmacotherapy despite history of diabetes.  Appropriate counseling provided.    3. Hypothyroidism, unspecified type  Chronic, controlled with levothyroxine 75 mcg daily, normal TSH in January 2019.  - levothyroxine (SYNTHROID) 75 MCG Tab; Take 1 Tab by mouth every day.   Dispense: 90 Tab; Refill: 1    4. Allergic rhinitis, unspecified seasonality, unspecified trigger  - Azelastine-Fluticasone 137-50 MCG/ACT Suspension; Spray 1 Spray in nose 2 Times a Day.  Dispense: 1 Bottle; Refill: 2  - triamcinolone acetonide (KENALOG-40) injection 40 mg; 1 mL by Intramuscular route Once.  - montelukast (SINGULAIR) 10 MG Tab; Take 1 Tab by mouth every day.  Dispense: 90 Tab; Refill: 1   -Nasal saline irrigation      Jasmin Hall M.D.    Followup: Return in about 6 months (around 10/23/2019) for Diabetes.    Please note that this dictation was created using voice recognition software and/or scribes. I have made every reasonable attempt to correct obvious errors, but I expect that there are errors of grammar and possibly content that I did not discover before finalizing the note.     IDalton (Scribe), am scribing for, and in the presence of, Jasmin Hall M.D.    Electronically signed by: Dalton Chadwick (Schuyleribjude), 4/23/2019    IJasmin M.D. personally performed the services described in this documentation, as scribed by Dalton Chadwick in my presence, and it is both accurate and complete.

## 2019-06-10 DIAGNOSIS — E11.9 TYPE 2 DIABETES MELLITUS WITHOUT COMPLICATION, WITHOUT LONG-TERM CURRENT USE OF INSULIN (HCC): ICD-10-CM

## 2019-06-11 RX ORDER — LANCETS
EACH MISCELLANEOUS
Qty: 100 EACH | Refills: 3 | Status: SHIPPED | OUTPATIENT
Start: 2019-06-11 | End: 2019-11-04 | Stop reason: SDUPTHER

## 2019-06-18 ENCOUNTER — OFFICE VISIT (OUTPATIENT)
Dept: MEDICAL GROUP | Age: 59
End: 2019-06-18
Payer: COMMERCIAL

## 2019-06-18 ENCOUNTER — HOSPITAL ENCOUNTER (OUTPATIENT)
Facility: MEDICAL CENTER | Age: 59
End: 2019-06-18
Attending: FAMILY MEDICINE
Payer: COMMERCIAL

## 2019-06-18 VITALS
OXYGEN SATURATION: 99 % | TEMPERATURE: 98 F | WEIGHT: 139 LBS | HEART RATE: 77 BPM | HEIGHT: 64 IN | SYSTOLIC BLOOD PRESSURE: 114 MMHG | DIASTOLIC BLOOD PRESSURE: 72 MMHG | BODY MASS INDEX: 23.73 KG/M2

## 2019-06-18 DIAGNOSIS — D48.5 NEOPLASM OF UNCERTAIN BEHAVIOR OF SKIN: ICD-10-CM

## 2019-06-18 DIAGNOSIS — L71.9 ROSACEA: ICD-10-CM

## 2019-06-18 DIAGNOSIS — E03.9 HYPOTHYROIDISM, UNSPECIFIED TYPE: ICD-10-CM

## 2019-06-18 DIAGNOSIS — E11.9 TYPE 2 DIABETES MELLITUS WITHOUT COMPLICATION, WITHOUT LONG-TERM CURRENT USE OF INSULIN (HCC): ICD-10-CM

## 2019-06-18 PROBLEM — R23.8 ERYTHEMATOUS PAPULES OF SKIN: Status: ACTIVE | Noted: 2019-06-18

## 2019-06-18 PROBLEM — R23.8 ERYTHEMATOUS PAPULES OF SKIN: Status: RESOLVED | Noted: 2019-06-18 | Resolved: 2019-06-18

## 2019-06-18 PROCEDURE — 99000 SPECIMEN HANDLING OFFICE-LAB: CPT | Performed by: FAMILY MEDICINE

## 2019-06-18 PROCEDURE — 88305 TISSUE EXAM BY PATHOLOGIST: CPT

## 2019-06-18 PROCEDURE — 99214 OFFICE O/P EST MOD 30 MIN: CPT | Mod: 25 | Performed by: FAMILY MEDICINE

## 2019-06-18 PROCEDURE — 88342 IMHCHEM/IMCYTCHM 1ST ANTB: CPT

## 2019-06-18 PROCEDURE — 88341 IMHCHEM/IMCYTCHM EA ADD ANTB: CPT | Mod: 91

## 2019-06-18 PROCEDURE — 11402 EXC TR-EXT B9+MARG 1.1-2 CM: CPT | Performed by: FAMILY MEDICINE

## 2019-06-18 NOTE — PROGRESS NOTES
Subjective:   CC: Tender skin lesion on the left arm    HPI:     Shona Garcia is a 58 y.o. female who is an established patient of the clinic, presents with the following concerns:     The patient complains of a mildly tender, hyperpigmented skin lesion located on the left lateral upper arm.  The lesion has been present for quite some time, but recently enlarged and becomes more tender.  She denies a personal or familial history of skin cancer.  She denies history of excessive sun exposure without protection.  She would like the mass to be removed.     Patient has history of type 2 diabetes, controlled with dietary/lifestyle modification.  Her last A1c was 7.0.  She denies any visual changes, concerning lesion on her feet, symptoms of polyneuropathy.    Patient also has history of hypothyroidism, on levothyroxine 75 mcg daily.  Last thyroid function test was normal in January 2019.    She has history of rosacea, under good control with metronidazole cream.  No side effect reported.    Current medicines (including changes today)  Current Outpatient Prescriptions   Medication Sig Dispense Refill   • ONE TOUCH ULTRASOFT LANCETS Misc CHECK YOUR BLOOD SUGAR ONCE DAILY 100 Each 3   • levothyroxine (SYNTHROID) 75 MCG Tab Take 1 Tab by mouth every day. 90 Tab 1   • Azelastine-Fluticasone 137-50 MCG/ACT Suspension Spray 1 Spray in nose 2 Times a Day. 1 Bottle 2   • montelukast (SINGULAIR) 10 MG Tab Take 1 Tab by mouth every day. 90 Tab 1   • AFLURIA QUADRIVALENT 0.5 ML Suspension Prefilled Syringe injection TO BE ADMINISTERED BY PHARMACIST FOR IMMUNIZATION  0   • triamcinolone acetonide (KENALOG) 0.1 % Cream Apply to affected area twice daily 1 Tube 2   • glucose blood (ACCU-CHEK COMPACT PLUS) strip 100 Strips by Other route every day. 100 Strip 3   • metronidazole (METROCREAM) 0.75 % cream Apply 1 Application to affected area(s) 2 times a day. 1 Tube 1   • Probiotic Product (PROBIOTIC ADVANCED PO) Take  by  "mouth.     • Omega-3 Fatty Acids (OMEGA 3 PO) Take  by mouth.     • vitamin D (CHOLECALCIFEROL) 1000 UNIT TABS Take 2,000 Units by mouth every day.       No current facility-administered medications for this visit.      She  has a past medical history of Allergy; Anemia; Blood transfusion (1986); Diabetes mellitus type II (4/18/2011); Dyslipidemia (8/28/2013); GERD (gastroesophageal reflux disease); Headache(784.0); Heart murmur; Measles; Pyelonephritis (2002); S/P colonoscopy (2010); Thyroid disease; Uterine fibroid (1986); Uterine fibroid; and Varicella. She also has no past medical history of Addisons disease (HCC); Adrenal disorder (HCC); Anxiety; Arrhythmia; ASTHMA; CATARACT; CHF (congestive heart failure) ([facility]); Clotting disorder (HCC); COPD; Cushings syndrome ([facility]); Depression; EMPHYSEMA; Glaucoma; Goiter; Heart attack ([facility]); Hypertension; IBD (inflammatory bowel disease); Meningitis; Migraine; Muscle disorder; OSTEOPOROSIS; Parathyroid disorder (HCC); Pituitary disease ([facility]); Seizure (HCC); Stroke ([facility]); Substance abuse (HCC); Tuberculosis; Ulcer; or Urinary tract infection, site not specified.    I personally reviewed patient's problem list, allergies, medications, family hx, social hx with patient and update EPIC.     REVIEW OF SYSTEMS:  CONSTITUTIONAL:  Denies night sweats, fatigue, malaise, lethargy, fever or chills.  RESPIRATORY:  Denies cough, wheeze, hemoptysis, or shortness of breath.  CARDIOVASCULAR:  Denies chest pains, palpitations, pedal edema  INTEGUMENTARY: Denies redness or discharge     Objective:     /72 (BP Location: Right arm, Patient Position: Sitting, BP Cuff Size: Adult)   Pulse 77   Temp 36.7 °C (98 °F) (Temporal)   Ht 1.626 m (5' 4\")   Wt 63 kg (139 lb)   SpO2 99%  Body mass index is 23.86 kg/m².    Physical Exam:  Constitutional: awake, alert, in no distress.  Skin: Warm, dry, good turgor, no rashes, bruises.  Mild central face erythema.  - mildly tender and mobile 0.8 x 9 " cm hyperpigmented mass on the left lateral upper arm.  Eye: conjunctiva clear, lids neg for edema or lesions.  Neck: Trachea midline, no masses, no thyromegaly. No cervical or supraclavicular lymphadenopathy  Respiratory: Unlabored respiratory effort, lungs clear to auscultation, no wheezes, no rales.  Cardiovascular: Normal S1, S2, no murmur, no pedal edema.   Psych: Oriented x3, affect and mood wnl, intact judgement and insight.       Assessment and Plan:   The following treatment plan was discussed    1. Neoplasm of uncertain behavior of skin  - Pathology Specimen; Future    2. Type 2 diabetes mellitus without complication, without long-term current use of insulin (HCC)  Chronic, diet controlled, last A1c was 7.0.  - Discussed dietary modification, exercise, weight loss  - Annual eye exam  - Regular foot exam  -Follow-up in 3 months    3. Rosacea  Chronic, controlled with metronidazole cream 0.75%, no side effect reported, will monitor.    4. Hypothyroidism, unspecified type  Chronic, controlled with levothyroxine 75 mcg daily, will continue.    Procedure note: EXCISION OF SUSPICIOUS SKIN LESION   Indication: tender, enlarging, hyperpigmented skin lesion   Performing physician: Jasmin Hall M.D.   Assistant: Gonzalo  Location: L lateral arm  Risks, benefits, potential complications, and alternative options discussed with patient. Patient consented to the procedure.   Local anesthesia is achieved with 2% Lidocaine w/ EPI.   The lesion was identified, marked, and cleansed thoroughly with Povidone-Iodine sticks x3. The surgical site was prepared and draped in the usual sterile manner. 2.5x1 cm elliptical skin incision was made in a linear fashion. The lesion was dissected and excised using #15 blade.  Hemostasis was achieved with direct pressure. The wound was closed by interrupted sutures. Patient tolerates the procedure well. No immediate complications noted.    EBL: minimal  Surgical specimen sent to pathology.    Follow-up: Standard post-procedure care is explained and return precautions are given. Patient will return to the clinic in 10 days for suture removal.     Jasmin Hall M.D.    Followup: Return in about 2 weeks (around 7/2/2019) for Suture removal.    Please note that this dictation was created using voice recognition software and/or scribes. I have made every reasonable attempt to correct obvious errors, but I expect that there are errors of grammar and possibly content that I did not discover before finalizing the note.     Chris LEYVA (Scribe), am scribing for, and in the presence of, Jasmin Hall M.D.    Electronically signed by: Chris Gore (Scribe), 6/18/2019    Jasmin LEYVA M.D. personally performed the services described in this documentation, as scribed by Chris Gore in my presence, and it is both accurate and complete.

## 2019-06-19 ENCOUNTER — TELEPHONE (OUTPATIENT)
Dept: MEDICAL GROUP | Age: 59
End: 2019-06-19

## 2019-06-19 LAB — PATHOLOGY CONSULT NOTE: NORMAL

## 2019-06-19 NOTE — TELEPHONE ENCOUNTER
1. Caller Name: Lab                                         Call Back Number: [id number]      A source was needed for the biopsy. Source of specimen is left lateral upper arm.

## 2019-07-02 ENCOUNTER — NON-PROVIDER VISIT (OUTPATIENT)
Dept: MEDICAL GROUP | Age: 59
End: 2019-07-02
Payer: COMMERCIAL

## 2019-07-02 NOTE — PROGRESS NOTES
Shona Garcia is a 58 y.o. female here for a Non-Provider Visit for Suture Removal.    Sutures were placed by Dr. Hall on date: 6/18/19  Skin is healed: Yes  Provider notified if skin is not healed, or if there is redness, heat, pain, or drainage from incision: N\A  Sutures removed.   Mastisol and steristips are placed: No    Advised to use emollient (vaseline, aquaphor, etc.) as needed, avoid peroxide and antibiotic ointment to reduce irritation.     Path report has been reviewed by provider.  Path report has been reviewed with patient.

## 2019-07-16 ENCOUNTER — OFFICE VISIT (OUTPATIENT)
Dept: MEDICAL GROUP | Age: 59
End: 2019-07-16
Payer: COMMERCIAL

## 2019-07-16 VITALS
OXYGEN SATURATION: 99 % | BODY MASS INDEX: 23.9 KG/M2 | DIASTOLIC BLOOD PRESSURE: 72 MMHG | WEIGHT: 140 LBS | HEART RATE: 71 BPM | SYSTOLIC BLOOD PRESSURE: 122 MMHG | HEIGHT: 64 IN | TEMPERATURE: 98 F

## 2019-07-16 DIAGNOSIS — T14.8XXA WOUND INFECTION: Primary | ICD-10-CM

## 2019-07-16 DIAGNOSIS — L08.9 WOUND INFECTION: Primary | ICD-10-CM

## 2019-07-16 PROCEDURE — 99213 OFFICE O/P EST LOW 20 MIN: CPT | Performed by: FAMILY MEDICINE

## 2019-07-16 RX ORDER — SULFAMETHOXAZOLE AND TRIMETHOPRIM 800; 160 MG/1; MG/1
1 TABLET ORAL 2 TIMES DAILY
Qty: 10 TAB | Refills: 0 | Status: SHIPPED | OUTPATIENT
Start: 2019-07-16 | End: 2019-07-21

## 2019-07-17 NOTE — PROGRESS NOTES
Subjective:   CC: wound infection    HPI:     Shona Garcia is a 58 y.o. female who is an established patient of the clinic, presents with the following concerns:     Patient here for acute complaint. She had a biopsy done on 6/18th after presenting to the clinic with a mildly tender, hyperpigmented skin lesion located on the left lateral upper arm. The wound was healing well and sutures removed after 2 weeks. She has been experiencing worsening redness to the area. She states that the wound was oozing the other day therefore she treated it with Neosporin and covered it with a band-aid. Unfortunately, she caused superificial skin tear for to the soft tissue surrounding the wound while removing the bandage leading to pain, bleeding, and worsening erythema of the previous and new wound.        Current medicines (including changes today)  Current Outpatient Prescriptions   Medication Sig Dispense Refill   • sulfamethoxazole-trimethoprim (BACTRIM DS) 800-160 MG tablet Take 1 Tab by mouth 2 times a day for 5 days. 10 Tab 0   • ONE TOUCH ULTRASOFT LANCETS Misc CHECK YOUR BLOOD SUGAR ONCE DAILY 100 Each 3   • levothyroxine (SYNTHROID) 75 MCG Tab Take 1 Tab by mouth every day. 90 Tab 1   • Azelastine-Fluticasone 137-50 MCG/ACT Suspension Spray 1 Spray in nose 2 Times a Day. 1 Bottle 2   • montelukast (SINGULAIR) 10 MG Tab Take 1 Tab by mouth every day. 90 Tab 1   • AFLURIA QUADRIVALENT 0.5 ML Suspension Prefilled Syringe injection TO BE ADMINISTERED BY PHARMACIST FOR IMMUNIZATION  0   • triamcinolone acetonide (KENALOG) 0.1 % Cream Apply to affected area twice daily 1 Tube 2   • glucose blood (ACCU-CHEK COMPACT PLUS) strip 100 Strips by Other route every day. 100 Strip 3   • metronidazole (METROCREAM) 0.75 % cream Apply 1 Application to affected area(s) 2 times a day. 1 Tube 1   • Probiotic Product (PROBIOTIC ADVANCED PO) Take  by mouth.     • Omega-3 Fatty Acids (OMEGA 3 PO) Take  by mouth.     • vitamin D  "(CHOLECALCIFEROL) 1000 UNIT TABS Take 2,000 Units by mouth every day.       No current facility-administered medications for this visit.      She  has a past medical history of Allergy; Anemia; Blood transfusion (1986); Diabetes mellitus type II (4/18/2011); Dyslipidemia (8/28/2013); GERD (gastroesophageal reflux disease); Headache(784.0); Heart murmur; Measles; Pyelonephritis (2002); S/P colonoscopy (2010); Thyroid disease; Uterine fibroid (1986); Uterine fibroid; and Varicella. She also has no past medical history of Addisons disease (HCC); Adrenal disorder (HCC); Anxiety; Arrhythmia; ASTHMA; CATARACT; CHF (congestive heart failure) ([facility]); Clotting disorder (HCC); COPD; Cushings syndrome ([facility]); Depression; EMPHYSEMA; Glaucoma; Goiter; Heart attack (HCC); Hypertension; IBD (inflammatory bowel disease); Meningitis; Migraine; Muscle disorder; OSTEOPOROSIS; Parathyroid disorder (HCC); Pituitary disease (HCC); Seizure (HCC); Stroke ([facility]); Substance abuse (HCC); Tuberculosis; Ulcer; or Urinary tract infection, site not specified.    I personally reviewed patient's problem list, allergies, medications, family hx, social hx with patient and update EPIC.     REVIEW OF SYSTEMS:  CONSTITUTIONAL:  Denies night sweats, fatigue, malaise, lethargy, fever or chills.  RESPIRATORY:  Denies cough, wheeze, hemoptysis, or shortness of breath.  CARDIOVASCULAR:  Denies chest pains, palpitations, pedal edema     Objective:     /72 (BP Location: Right arm, Patient Position: Sitting, BP Cuff Size: Adult)   Pulse 71   Temp 36.7 °C (98 °F) (Temporal)   Ht 1.626 m (5' 4\")   Wt 63.5 kg (140 lb)   SpO2 99%  Body mass index is 24.03 kg/m².    Physical Exam:  Constitutional: awake, alert, in no distress.  Skin:  Warm, dry, good turgor, no rashes, bruises, ulcers in visible areas.  - 0.2 cm opening at the center of surgical wound with surrounding erythema and mild oozing of clear fluid  - small superficial laceration of the surrounding " skin lateral to the previous wound. This wound has mild bleeding and surrounding erythema.   Respiratory: Unlabored respiratory effort, lungs clear to auscultation, no wheezes, no rales.  Cardiovascular: Normal S1, S2, no murmur, no pedal edema.  Psych: Oriented x3, affect and mood wnl, intact judgement and insight.     Assessment and Plan:   The following treatment plan was discussed    1. Wound infection  59 yo female with hx of diet controlled type 2 DM who recently underwent excision of concerning lesion at the L lateral arm. The wound is now slightly reopen with mild oozing of clear fluid and surrounding erythema. A small superficial skin tear was noted lateral to the surgical wound, which was caused by forceful removal of bandage. Plans:   - sulfamethoxazole-trimethoprim (BACTRIM DS) 800-160 MG tablet; Take 1 Tab by mouth 2 times a day for 5 days.  Dispense: 10 Tab; Refill: 0  - wound care instructions provided.   - f/u PRN    Jasmin Hall M.D.    Followup: Return for As needed.    Please note that this dictation was created using voice recognition software and/or scribes. I have made every reasonable attempt to correct obvious errors, but I expect that there are errors of grammar and possibly content that I did not discover before finalizing the note.     Gypsy LEYVA (Schuyleribjude), am scribing for, and in the presence of, Jasmin Hall M.D.    Electronically signed by: Gypsy Cunningham (Andreina), 7/16/2019    Jasmin LEYVA M.D. personally performed the services described in this documentation, as scribed by Gypsy Cunningham in my presence, and it is both accurate and complete.

## 2019-08-05 DIAGNOSIS — E11.9 TYPE 2 DIABETES MELLITUS WITHOUT COMPLICATION, WITHOUT LONG-TERM CURRENT USE OF INSULIN (HCC): ICD-10-CM

## 2019-08-06 RX ORDER — BLOOD SUGAR DIAGNOSTIC
STRIP MISCELLANEOUS
Qty: 100 STRIP | Refills: 0 | Status: SHIPPED | OUTPATIENT
Start: 2019-08-06 | End: 2019-09-07 | Stop reason: SDUPTHER

## 2019-09-07 DIAGNOSIS — E11.9 TYPE 2 DIABETES MELLITUS WITHOUT COMPLICATION, WITHOUT LONG-TERM CURRENT USE OF INSULIN (HCC): ICD-10-CM

## 2019-09-09 RX ORDER — BLOOD SUGAR DIAGNOSTIC
STRIP MISCELLANEOUS
Qty: 100 STRIP | Refills: 1 | Status: SHIPPED | OUTPATIENT
Start: 2019-09-09 | End: 2019-09-13

## 2019-09-11 DIAGNOSIS — E11.9 TYPE 2 DIABETES MELLITUS WITHOUT COMPLICATION, WITHOUT LONG-TERM CURRENT USE OF INSULIN (HCC): ICD-10-CM

## 2019-09-13 RX ORDER — BLOOD SUGAR DIAGNOSTIC
STRIP MISCELLANEOUS
Qty: 100 STRIP | Refills: 0 | Status: SHIPPED | OUTPATIENT
Start: 2019-09-13 | End: 2019-11-04 | Stop reason: SDUPTHER

## 2019-10-19 ENCOUNTER — HOSPITAL ENCOUNTER (OUTPATIENT)
Dept: LAB | Facility: MEDICAL CENTER | Age: 59
End: 2019-10-19
Attending: FAMILY MEDICINE
Payer: COMMERCIAL

## 2019-10-19 DIAGNOSIS — E11.9 TYPE 2 DIABETES MELLITUS WITHOUT COMPLICATION, WITHOUT LONG-TERM CURRENT USE OF INSULIN (HCC): ICD-10-CM

## 2019-10-19 LAB
EST. AVERAGE GLUCOSE BLD GHB EST-MCNC: 174 MG/DL
HBA1C MFR BLD: 7.7 % (ref 0–5.6)

## 2019-10-19 PROCEDURE — 83036 HEMOGLOBIN GLYCOSYLATED A1C: CPT

## 2019-10-19 PROCEDURE — 36415 COLL VENOUS BLD VENIPUNCTURE: CPT

## 2019-10-22 ENCOUNTER — OFFICE VISIT (OUTPATIENT)
Dept: MEDICAL GROUP | Age: 59
End: 2019-10-22
Payer: COMMERCIAL

## 2019-10-22 VITALS
OXYGEN SATURATION: 98 % | RESPIRATION RATE: 18 BRPM | SYSTOLIC BLOOD PRESSURE: 134 MMHG | DIASTOLIC BLOOD PRESSURE: 76 MMHG | BODY MASS INDEX: 24.8 KG/M2 | HEIGHT: 63 IN | TEMPERATURE: 97.7 F | WEIGHT: 140 LBS | HEART RATE: 79 BPM

## 2019-10-22 DIAGNOSIS — E03.9 ACQUIRED HYPOTHYROIDISM: ICD-10-CM

## 2019-10-22 DIAGNOSIS — E11.9 TYPE 2 DIABETES MELLITUS WITHOUT COMPLICATION, WITHOUT LONG-TERM CURRENT USE OF INSULIN (HCC): Primary | ICD-10-CM

## 2019-10-22 DIAGNOSIS — Z00.00 PE (PHYSICAL EXAM), ANNUAL: ICD-10-CM

## 2019-10-22 DIAGNOSIS — Z23 NEED FOR VACCINATION: ICD-10-CM

## 2019-10-22 DIAGNOSIS — E78.00 PURE HYPERCHOLESTEROLEMIA: ICD-10-CM

## 2019-10-22 PROBLEM — L08.9 WOUND INFECTION: Status: RESOLVED | Noted: 2019-07-16 | Resolved: 2019-10-22

## 2019-10-22 PROBLEM — T14.8XXA WOUND INFECTION: Status: RESOLVED | Noted: 2019-07-16 | Resolved: 2019-10-22

## 2019-10-22 PROCEDURE — 90686 IIV4 VACC NO PRSV 0.5 ML IM: CPT | Performed by: FAMILY MEDICINE

## 2019-10-22 PROCEDURE — 99214 OFFICE O/P EST MOD 30 MIN: CPT | Mod: 25 | Performed by: FAMILY MEDICINE

## 2019-10-22 PROCEDURE — 90471 IMMUNIZATION ADMIN: CPT | Performed by: FAMILY MEDICINE

## 2019-10-22 RX ORDER — GLIMEPIRIDE 2 MG/1
2 TABLET ORAL EVERY MORNING
Qty: 90 TAB | Refills: 1 | Status: SHIPPED | OUTPATIENT
Start: 2019-10-22 | End: 2020-02-04 | Stop reason: SDUPTHER

## 2019-10-22 RX ORDER — LEVOTHYROXINE SODIUM 0.07 MG/1
75 TABLET ORAL
Qty: 90 TAB | Refills: 1 | Status: SHIPPED | OUTPATIENT
Start: 2019-10-22 | End: 2020-02-04 | Stop reason: SDUPTHER

## 2019-10-22 ASSESSMENT — PAIN SCALES - GENERAL: PAINLEVEL: 5=MODERATE PAIN

## 2019-10-22 NOTE — LETTER
[facility]  Jasmin Hall M.D.  [address] [place] [address]  Fax: [phone]   Authorization for Release/Disclosure of   Protected Health Information   Name: SHONA KAUFMAN : 1960 SSN: [id number]   Address: [address] [place] [address] [place] [address] Phone:    [phone] (home)    I authorize the entity listed below to release/disclose the PHI below to:   [facility]/Jasmin Hall M.D. and Jasmin Hall M.D.   Provider or Entity Name:  [facility]    Address [address] [address] [facility], [place] [address]   Phone: [phone]    Fax: [phone]     Reason for request: continuity of care   Information to be released:    [  ] LAST COLONOSCOPY,  including any PATH REPORT and follow-up  [  ] LAST FIT/COLOGUARD RESULT [  ] LAST DEXA  [  ] LAST MAMMOGRAM  [  ] LAST PAP  [  ] LAST LABS [  ] RETINA EXAM REPORT  [  ] IMMUNIZATION RECORDS  [xx] Release all info      [  ] Check here and initial the line next to each item to release ALL health information INCLUDING  _____ Care and treatment for drug and / or alcohol abuse  _____ HIV testing, infection status, or AIDS  _____ Genetic Testing    DATES OF SERVICE OR TIME PERIOD TO BE DISCLOSED: _____________  I understand and acknowledge that:  * This Authorization may be revoked at any time by you in writing, except if your health information has already been used or disclosed.  * Your health information that will be used or disclosed as a result of you signing this authorization could be re-disclosed by the recipient. If this occurs, your re-disclosed health information may no longer be protected by State or Federal laws.  * You may refuse to sign this Authorization. Your refusal will not affect your ability to obtain treatment.  * This Authorization becomes effective upon signing and will  on (date) __________.      If no date is indicated, this Authorization will  one (1) year from the signature date.    Name: Shona Wood  Radha    Signature:   Date:     10/22/2019       PLEASE FAX REQUESTED RECORDS BACK TO: [phone]

## 2019-10-22 NOTE — PROGRESS NOTES
Subjective:   CC: DM f/u     HPI:     Shona Garcia is a 59 y.o. female who is an established patient of the clinic, presents with the following concerns:       Patient has a history of Type II Diabetes. Historically, pt tried Metformin but c/o vertigo. She is very resistant to medication and has been trying to manage blood sugar through diet and exercise. On 10/19/19, hemoglobin A1c was 7.7, increased compared with previous A1c of 7.0 in 04/2019. The patient states she has not been watching her diet closely in the last few months. She has been eating more sweet foods that she got from [place]. Patient has routine retinal exams at [facility]. She denies any visual changes, concerning lesions on her feet, symptoms of polyneuropathy.     Patient has a history of acquired hypothyroidism, currently taking Levothyroxine 75 mcg daily. No side effects reported. Thyroid function test on 1/18/19 showed TSH of 3.270.    Patient has a history of pure hypercholesteremia, currently controlled through diet and exercise. Most recent lipid panel on 1/18/19 showed Triglycerides of 167. Patient declines to start pharmacotherapy at this time.       Current medicines (including changes today)  Current Outpatient Medications   Medication Sig Dispense Refill   • glimepiride (AMARYL) 2 MG Tab Take 1 Tab by mouth every morning. 90 Tab 1   • levothyroxine (SYNTHROID) 75 MCG Tab Take 1 Tab by mouth every day. 90 Tab 1   • ONETOUCH VERIO strip 100 STRIPS BY OTHER ROUTE EVERY DAY*E11.9 100 Strip 0   • ONE TOUCH ULTRASOFT LANCETS [facility] CHECK YOUR BLOOD SUGAR ONCE DAILY 100 Each 3   • Azelastine-Fluticasone 137-50 MCG/ACT Suspension Spray 1 Spray in nose 2 Times a Day. 1 Bottle 2   • montelukast (SINGULAIR) 10 MG Tab Take 1 Tab by mouth every day. 90 Tab 1   • triamcinolone acetonide (KENALOG) 0.1 % Cream Apply to affected area twice daily 1 Tube 2   • metronidazole (METROCREAM) 0.75 % cream Apply 1 Application to affected  "area(s) 2 times a day. 1 Tube 1   • Probiotic Product (PROBIOTIC ADVANCED PO) Take  by mouth.     • Omega-3 Fatty Acids (OMEGA 3 PO) Take  by mouth.     • vitamin D (CHOLECALCIFEROL) 1000 UNIT TABS Take 2,000 Units by mouth every day.       No current facility-administered medications for this visit.      She  has a past medical history of Allergy, Anemia, Blood transfusion (1986), Diabetes mellitus type II (4/18/2011), Dyslipidemia (8/28/2013), GERD (gastroesophageal reflux disease), Headache(784.0), Heart murmur, Measles, Pyelonephritis (2002), S/P colonoscopy (2010), Thyroid disease, Uterine fibroid (1986), Uterine fibroid, and Varicella. She also has no past medical history of Addisons disease (HCC), Adrenal disorder (HCC), Anxiety, Arrhythmia, ASTHMA, CATARACT, CHF (congestive heart failure) ([facility]), Clotting disorder (HCC), COPD, Cushings syndrome ([facility]), Depression, EMPHYSEMA, Glaucoma, Goiter, Heart attack (HCC), Hypertension, IBD (inflammatory bowel disease), Meningitis, Migraine, Muscle disorder, OSTEOPOROSIS, Parathyroid disorder (HCC), Pituitary disease (HCC), Seizure (HCC), Stroke ([facility]), Substance abuse (HCC), Tuberculosis, Ulcer, or Urinary tract infection, site not specified.    I personally reviewed patient's problem list, allergies, medications, family hx, social hx with patient and update EPIC.     REVIEW OF SYSTEMS:  CONSTITUTIONAL:  Denies night sweats, fatigue, malaise, lethargy, fever or chills.  RESPIRATORY:  Denies cough, wheeze, hemoptysis, or shortness of breath.  CARDIOVASCULAR:  Denies chest pains, palpitations, pedal edema     Objective:     /76   Pulse 79   Temp 36.5 °C (97.7 °F) (Temporal)   Resp 18   Ht 1.6 m (5' 3\")   Wt 63.5 kg (140 lb)   SpO2 98%  Body mass index is 24.8 kg/m².    Physical Exam:  Constitutional: awake, alert, in no distress.  Skin: Warm, dry, good turgor, no rashes, bruises, ulcers in visible areas.  Neck: Trachea midline, no masses, no thyromegaly. No " cervical or supraclavicular lymphadenopathy  Respiratory: Unlabored respiratory effort, lungs clear to auscultation, no wheezes, no rales.  Cardiovascular: Normal S1, S2, no pedal edema. Systolic ejection murmur best heard at the L upper sternum.   Psych: Oriented x3, affect and mood wnl, intact judgement and insight.       Assessment and Plan:   The following treatment plan was discussed    1. Type 2 diabetes mellitus without complication, without long-term current use of insulin (HCC)  Chronic, historically resistant to medication, has been managing DM with diet, recent blood tests noted for A1C of 7.7 (previously 7.0). Pt has not been adhering to diet. She is active, but does not exercise regularly. I had long discussion with patient regarding DM and associated risks to her health. She agrees to start medication. She was not able to tolerate Metformin due to vertigo. Plans:   - glimepiride (AMARYL) 2 MG Tab; Take 1 Tab by mouth every morning.  Dispense: 90 Tab; Refill: 1  - Discussed dietary modification, exercise, weight loss  - Annual eye exam  - Regular foot exam  - Discussed prevention and management of hypoglycemia  - Side effects of all medications discussed with patient  - Follow up in 3 months.     2. Acquired hypothyroidism  Chronic, controlled with Levothyroxine 75 mcg daily, no s/e reported, will continue.   - levothyroxine (SYNTHROID) 75 MCG Tab; Take 1 Tab by mouth every day.  Dispense: 90 Tab; Refill: 1    3. Pure hypercholesterolemia_declines med  Chronic, mild, recommended statin secondary to DM but pt declined.   - recommended dietary modification, exercise, and weight loss.      4. Need for vaccination  - Influenza Vaccine Quad Injection (PF)      - Lipid Profile; Future  - MICROALBUMIN CREAT RATIO URINE; Future  - HEMOGLOBIN A1C; Future  - Comp Metabolic Panel; Future  - CBC WITH DIFFERENTIAL; Future         Ly ALEJANDRA Hall M.D.    Followup: Return in about 3 months (around 1/22/2020) for  Diabetes.    Please note that this dictation was created using voice recognition software and/or scribes. I have made every reasonable attempt to correct obvious errors, but I expect that there are errors of grammar and possibly content that I did not discover before finalizing the note.     Chele LEYVA (Scribe), am scribing for, and in the presence of, Jasmin Hall M.D.    Electronically signed by: Chele Major (Scribe), 10/22/2019    Jasmin LEYVA M.D. personally performed the services described in this documentation, as scribed by Chele Major in my presence, and it is both accurate and complete.

## 2019-11-02 ENCOUNTER — PATIENT MESSAGE (OUTPATIENT)
Dept: MEDICAL GROUP | Age: 59
End: 2019-11-02

## 2019-11-02 DIAGNOSIS — E11.9 TYPE 2 DIABETES MELLITUS WITHOUT COMPLICATION, WITHOUT LONG-TERM CURRENT USE OF INSULIN (HCC): ICD-10-CM

## 2019-11-04 RX ORDER — LANCETS
EACH MISCELLANEOUS
Qty: 100 EACH | Refills: 1 | Status: SHIPPED | OUTPATIENT
Start: 2019-11-04 | End: 2020-03-13 | Stop reason: SDUPTHER

## 2019-11-04 NOTE — PATIENT COMMUNICATION
Was the patient seen in the last year in this department? Yes    Does patient have an active prescription for medications requested? No     Received Request Via: Patient, requesting new prescription to new pharmacy

## 2020-02-01 ENCOUNTER — HOSPITAL ENCOUNTER (OUTPATIENT)
Dept: LAB | Facility: MEDICAL CENTER | Age: 60
End: 2020-02-01
Attending: FAMILY MEDICINE
Payer: COMMERCIAL

## 2020-02-01 DIAGNOSIS — Z00.00 PE (PHYSICAL EXAM), ANNUAL: ICD-10-CM

## 2020-02-01 LAB
ALBUMIN SERPL BCP-MCNC: 4.4 G/DL (ref 3.2–4.9)
ALBUMIN/GLOB SERPL: 1.5 G/DL
ALP SERPL-CCNC: 58 U/L (ref 30–99)
ALT SERPL-CCNC: 19 U/L (ref 2–50)
ANION GAP SERPL CALC-SCNC: 8 MMOL/L (ref 0–11.9)
AST SERPL-CCNC: 14 U/L (ref 12–45)
BASOPHILS # BLD AUTO: 0.8 % (ref 0–1.8)
BASOPHILS # BLD: 0.05 K/UL (ref 0–0.12)
BILIRUB SERPL-MCNC: 0.6 MG/DL (ref 0.1–1.5)
BUN SERPL-MCNC: 10 MG/DL (ref 8–22)
CALCIUM SERPL-MCNC: 10.1 MG/DL (ref 8.5–10.5)
CHLORIDE SERPL-SCNC: 100 MMOL/L (ref 96–112)
CHOLEST SERPL-MCNC: 173 MG/DL (ref 100–199)
CO2 SERPL-SCNC: 28 MMOL/L (ref 20–33)
CREAT SERPL-MCNC: 0.67 MG/DL (ref 0.5–1.4)
CREAT UR-MCNC: 22.2 MG/DL
EOSINOPHIL # BLD AUTO: 0.08 K/UL (ref 0–0.51)
EOSINOPHIL NFR BLD: 1.3 % (ref 0–6.9)
ERYTHROCYTE [DISTWIDTH] IN BLOOD BY AUTOMATED COUNT: 40.7 FL (ref 35.9–50)
EST. AVERAGE GLUCOSE BLD GHB EST-MCNC: 177 MG/DL
FASTING STATUS PATIENT QL REPORTED: NORMAL
GLOBULIN SER CALC-MCNC: 3 G/DL (ref 1.9–3.5)
GLUCOSE SERPL-MCNC: 147 MG/DL (ref 65–99)
HBA1C MFR BLD: 7.8 % (ref 0–5.6)
HCT VFR BLD AUTO: 40.3 % (ref 37–47)
HDLC SERPL-MCNC: 44 MG/DL
HGB BLD-MCNC: 13 G/DL (ref 12–16)
IMM GRANULOCYTES # BLD AUTO: 0.01 K/UL (ref 0–0.11)
IMM GRANULOCYTES NFR BLD AUTO: 0.2 % (ref 0–0.9)
LDLC SERPL CALC-MCNC: 90 MG/DL
LYMPHOCYTES # BLD AUTO: 2.39 K/UL (ref 1–4.8)
LYMPHOCYTES NFR BLD: 38.4 % (ref 22–41)
MCH RBC QN AUTO: 28.3 PG (ref 27–33)
MCHC RBC AUTO-ENTMCNC: 32.3 G/DL (ref 33.6–35)
MCV RBC AUTO: 87.8 FL (ref 81.4–97.8)
MICROALBUMIN UR-MCNC: <0.7 MG/DL
MICROALBUMIN/CREAT UR: NORMAL MG/G (ref 0–30)
MONOCYTES # BLD AUTO: 0.37 K/UL (ref 0–0.85)
MONOCYTES NFR BLD AUTO: 5.9 % (ref 0–13.4)
NEUTROPHILS # BLD AUTO: 3.32 K/UL (ref 2–7.15)
NEUTROPHILS NFR BLD: 53.4 % (ref 44–72)
NRBC # BLD AUTO: 0 K/UL
NRBC BLD-RTO: 0 /100 WBC
PLATELET # BLD AUTO: 351 K/UL (ref 164–446)
PMV BLD AUTO: 9.4 FL (ref 9–12.9)
POTASSIUM SERPL-SCNC: 4.3 MMOL/L (ref 3.6–5.5)
PROT SERPL-MCNC: 7.4 G/DL (ref 6–8.2)
RBC # BLD AUTO: 4.59 M/UL (ref 4.2–5.4)
SODIUM SERPL-SCNC: 136 MMOL/L (ref 135–145)
TRIGL SERPL-MCNC: 196 MG/DL (ref 0–149)
WBC # BLD AUTO: 6.2 K/UL (ref 4.8–10.8)

## 2020-02-01 PROCEDURE — 80061 LIPID PANEL: CPT

## 2020-02-01 PROCEDURE — 85025 COMPLETE CBC W/AUTO DIFF WBC: CPT

## 2020-02-01 PROCEDURE — 82043 UR ALBUMIN QUANTITATIVE: CPT

## 2020-02-01 PROCEDURE — 82570 ASSAY OF URINE CREATININE: CPT

## 2020-02-01 PROCEDURE — 83036 HEMOGLOBIN GLYCOSYLATED A1C: CPT

## 2020-02-01 PROCEDURE — 36415 COLL VENOUS BLD VENIPUNCTURE: CPT

## 2020-02-01 PROCEDURE — 80053 COMPREHEN METABOLIC PANEL: CPT

## 2020-02-04 ENCOUNTER — OFFICE VISIT (OUTPATIENT)
Dept: MEDICAL GROUP | Age: 60
End: 2020-02-04
Payer: COMMERCIAL

## 2020-02-04 VITALS
HEART RATE: 71 BPM | SYSTOLIC BLOOD PRESSURE: 126 MMHG | TEMPERATURE: 97.5 F | HEIGHT: 64 IN | DIASTOLIC BLOOD PRESSURE: 84 MMHG | OXYGEN SATURATION: 98 % | BODY MASS INDEX: 23.05 KG/M2 | WEIGHT: 135 LBS

## 2020-02-04 DIAGNOSIS — E03.9 ACQUIRED HYPOTHYROIDISM: ICD-10-CM

## 2020-02-04 DIAGNOSIS — E11.9 TYPE 2 DIABETES MELLITUS WITHOUT COMPLICATION, WITHOUT LONG-TERM CURRENT USE OF INSULIN (HCC): Primary | ICD-10-CM

## 2020-02-04 DIAGNOSIS — K64.9 HEMORRHOIDS, UNSPECIFIED HEMORRHOID TYPE: ICD-10-CM

## 2020-02-04 DIAGNOSIS — E78.1 PURE HYPERTRIGLYCERIDEMIA: ICD-10-CM

## 2020-02-04 DIAGNOSIS — M25.562 CHRONIC PAIN OF BOTH KNEES: ICD-10-CM

## 2020-02-04 DIAGNOSIS — G89.29 CHRONIC PAIN OF BOTH KNEES: ICD-10-CM

## 2020-02-04 DIAGNOSIS — M25.561 CHRONIC PAIN OF BOTH KNEES: ICD-10-CM

## 2020-02-04 DIAGNOSIS — Z12.11 COLON CANCER SCREENING: ICD-10-CM

## 2020-02-04 PROCEDURE — 99214 OFFICE O/P EST MOD 30 MIN: CPT | Performed by: FAMILY MEDICINE

## 2020-02-04 PROCEDURE — 92250 FUNDUS PHOTOGRAPHY W/I&R: CPT | Mod: TC | Performed by: FAMILY MEDICINE

## 2020-02-04 RX ORDER — GLIMEPIRIDE 2 MG/1
2 TABLET ORAL EVERY MORNING
Qty: 90 TAB | Refills: 1 | Status: SHIPPED | OUTPATIENT
Start: 2020-02-04 | End: 2020-05-15 | Stop reason: SDUPTHER

## 2020-02-04 RX ORDER — LEVOTHYROXINE SODIUM 0.07 MG/1
75 TABLET ORAL
Qty: 90 TAB | Refills: 1 | Status: SHIPPED | OUTPATIENT
Start: 2020-02-04 | End: 2020-05-15 | Stop reason: SDUPTHER

## 2020-02-04 RX ORDER — MECLIZINE HYDROCHLORIDE 25 MG/1
25 TABLET ORAL 3 TIMES DAILY PRN
Qty: 30 TAB | Refills: 0 | Status: SHIPPED
Start: 2020-02-04 | End: 2020-05-15

## 2020-02-04 ASSESSMENT — PATIENT HEALTH QUESTIONNAIRE - PHQ9: CLINICAL INTERPRETATION OF PHQ2 SCORE: 0

## 2020-02-05 NOTE — PROGRESS NOTES
Subjective:   CC: DM follow up     HPI:     Shona Garcia is a 59 y.o. female, established patient of the clinic, presents with the following concerns:     Hx of type 2 DM, historically very resistant to medication. However, her A1C continues to be elevated despite dietary and lifestyle modification. At previous OV, I prescribed Glimepiride 2 mg qd, but pt states that she did not take the medication hoping that blood sugar will decrease with dietary modification. However, she has not been adhering strictly to diabetic diet due to the holidays. Her A1C increases from 7.7 to 7.8 with most recent blood tests.  She denies visual changes, concerning lesion on her feet, symptoms of polyneuropathy.    Patient has history of hypothyroidism, currently taking levothyroxine 75 mcg daily.  She is due for repeat thyroid function test.  She tolerates medication well, no side effect reported.    Patient has history of hypertriglyceridemia.  Her cholesterol has always been normal.  Patient currently working on dietary and lifestyle modification.  Her triglyceride is improving compared to July 2017 when it was 270.  She declined pharmacotherapy at this time.    She complains of hemorrhoidal itching over the past few weeks.  She declined annual exam.  She has been working on increasing hydration and fiber to control constipation.  For the most part, she stated constipation is under good control.  She soft bowel movement every 1 to 2 days.    She has history of chronic bilateral knee pain secondary to osteoarthritis.  She has been taking diclofenac ointment with with adequate control of symptoms.  She requests refill of the medication today.      Current medicines (including changes today)  Current Outpatient Medications   Medication Sig Dispense Refill   • glimepiride (AMARYL) 2 MG Tab Take 1 Tab by mouth every morning. 90 Tab 1   • levothyroxine (SYNTHROID) 75 MCG Tab Take 1 Tab by mouth every day. 90 Tab 1   •  meclizine (ANTIVERT) 25 MG Tab Take 1 Tab by mouth 3 times a day as needed. 30 Tab 0   • hydrocortisone rectal (PROCTOZONE HC) 2.5 % Cream Apply sparingly up to two times daily 30 g 1   • Diclofenac Sodium 1 % Gel Apply to 2-4 gram to affected area 4 times daily 100 g 1   • glucose blood (ONETOUCH VERIO) strip 100 STRIPS BY OTHER ROUTE EVERY DAY*E11.9 100 Strip 1   • ONE TOUCH ULTRASOFT LANCETS [facility] [place] [facility] CHECK YOUR BLOOD SUGAR ONCE DAILY 100 Each 1   • Azelastine-Fluticasone 137-50 MCG/ACT Suspension Spray 1 Spray in nose 2 Times a Day. 1 Bottle 2   • montelukast (SINGULAIR) 10 MG Tab Take 1 Tab by mouth every day. 90 Tab 1   • triamcinolone acetonide (KENALOG) 0.1 % Cream Apply to affected area twice daily 1 Tube 2   • metronidazole (METROCREAM) 0.75 % cream Apply 1 Application to affected area(s) 2 times a day. 1 Tube 1   • Probiotic Product (PROBIOTIC ADVANCED PO) Take  by mouth.     • Omega-3 Fatty Acids (OMEGA 3 PO) Take  by mouth.     • vitamin D (CHOLECALCIFEROL) 1000 UNIT TABS Take 2,000 Units by mouth every day.       No current facility-administered medications for this visit.      She  has a past medical history of Allergy, Anemia, Blood transfusion (1986), Diabetes mellitus type II (4/18/2011), Dyslipidemia (8/28/2013), GERD (gastroesophageal reflux disease), Headache(784.0), Heart murmur, Measles, Pyelonephritis (2002), S/P colonoscopy (2010), Thyroid disease, Uterine fibroid (1986), Uterine fibroid, and Varicella. She also has no past medical history of Addisons disease ([facility]), Adrenal disorder ([facility]), Anxiety, Arrhythmia, ASTHMA, CATARACT, CHF (congestive heart failure) ([facility]), Clotting disorder ([facility]), COPD, Cushings syndrome ([facility]), Depression, EMPHYSEMA, Glaucoma, Goiter, Heart attack ([facility]), Hypertension, IBD (inflammatory bowel disease), Meningitis, Migraine, Muscle disorder, OSTEOPOROSIS, Parathyroid disorder ([facility]), Pituitary disease ([facility]), Seizure ([facility]), Stroke ([facility]), Substance abuse ([facility]), Tuberculosis,  "Ulcer, or Urinary tract infection, site not specified.    I personally reviewed patient's problem list, allergies, medications, family hx, social hx with patient and update EPIC.     REVIEW OF SYSTEMS:  CONSTITUTIONAL:  Denies night sweats, fatigue, malaise, lethargy, fever or chills.  RESPIRATORY:  Denies cough, wheeze, hemoptysis, or shortness of breath.  CARDIOVASCULAR:  Denies chest pains, palpitations, pedal edema     Objective:     /84 (BP Location: Right arm, Patient Position: Sitting, BP Cuff Size: Adult)   Pulse 71   Temp 36.4 °C (97.5 °F) (Temporal)   Ht 1.626 m (5' 4\")   Wt 61.2 kg (135 lb)   SpO2 98%  Body mass index is 23.17 kg/m².    Physical Exam:  Constitutional: awake, alert, in no distress.  Skin: Warm, dry, good turgor, no rashes, bruises, ulcers in visible areas.  Eye: conjunctiva clear, lids neg for edema or lesions.  ENMT: TM and auditory canals wnl. Oral and nasal mucosa wnl. Lips without lesions, good dentition, oropharynx clear.  Neck: Trachea midline, no masses, no thyromegaly. No cervical or supraclavicular lymphadenopathy  Respiratory: Unlabored respiratory effort, lungs clear to auscultation, no wheezes, no rales.  Cardiovascular: Normal S1, S2, no murmur, no pedal edema.  Psych: Oriented x3, affect and mood wnl, intact judgement and insight.       Assessment and Plan:   The following treatment plan was discussed    1. Type 2 diabetes mellitus without complication, without long-term current use of insulin (HCC)  Chronic, resistant to pharmacotherapy.  Patient has not been taking glimepiride since last office visit.  She continue to work on dietary and lifestyle modification however, inconsistently.  Her A1c increases from 7.7-7.8 per most recent blood test.  I discussed the importance of compliance to medication for glycemic control.  - glimepiride (AMARYL) 2 MG Tab; Take 1 Tab by mouth every morning.  Dispense: 90 Tab; Refill: 1  - HEMOGLOBIN A1C; Future  - POCT Retinal Eye " Exam  - Discussed dietary modification, exercise, weight loss  - Annual eye exam  - Regular foot exam  - Discussed prevention and management of hypoglycemia  - Side effects of all medications discussed with patient  - Follow up in 3 months.     2. Acquired hypothyroidism  - TSH; Future  - levothyroxine (SYNTHROID) 75 MCG Tab; Take 1 Tab by mouth every day.  Dispense: 90 Tab; Refill: 1    3. Pure hypertriglyceridemia  - recommended dietary modification, exercise, and weight loss.        4. Hemorrhoids, unspecified hemorrhoid type  - hydrocortisone rectal (PROCTOZONE HC) 2.5 % Cream; Apply sparingly up to two times daily  Dispense: 30 g; Refill: 1    5. Chronic pain of both knees  - Diclofenac Sodium 1 % Gel; Apply to 2-4 gram to affected area 4 times daily  Dispense: 100 g; Refill: 1  - rehab exercises   - OTC analgesics PRN for pain   - weight loss  - icing, rest, activity modification     6. Colon cancer screening  - REFERRAL TO GASTROENTEROLOGY      Jasmin Hall M.D.      Followup: Return in about 3 months (around 5/4/2020).    Please note that this dictation was created using voice recognition software. I have made every reasonable attempt to correct obvious errors, but I expect that there are errors of grammar and possibly content that I did not discover before finalizing the note.

## 2020-02-07 LAB — RETINAL SCREEN: NEGATIVE

## 2020-02-10 ENCOUNTER — HOSPITAL ENCOUNTER (OUTPATIENT)
Dept: RADIOLOGY | Facility: MEDICAL CENTER | Age: 60
End: 2020-02-10
Attending: FAMILY MEDICINE
Payer: COMMERCIAL

## 2020-02-10 DIAGNOSIS — Z12.31 ENCOUNTER FOR SCREENING MAMMOGRAM FOR BREAST CANCER: ICD-10-CM

## 2020-02-10 PROCEDURE — 77067 SCR MAMMO BI INCL CAD: CPT

## 2020-03-12 ENCOUNTER — PATIENT MESSAGE (OUTPATIENT)
Dept: MEDICAL GROUP | Age: 60
End: 2020-03-12

## 2020-03-12 DIAGNOSIS — E11.9 TYPE 2 DIABETES MELLITUS WITHOUT COMPLICATION, WITHOUT LONG-TERM CURRENT USE OF INSULIN (HCC): ICD-10-CM

## 2020-03-13 RX ORDER — BLOOD SUGAR DIAGNOSTIC
STRIP MISCELLANEOUS
Qty: 100 STRIP | Refills: 1 | Status: SHIPPED | OUTPATIENT
Start: 2020-03-13 | End: 2020-06-01 | Stop reason: SDUPTHER

## 2020-03-13 RX ORDER — LANCETS
EACH MISCELLANEOUS
Qty: 100 EACH | Refills: 1 | Status: SHIPPED | OUTPATIENT
Start: 2020-03-13 | End: 2020-06-01 | Stop reason: SDUPTHER

## 2020-05-12 ENCOUNTER — HOSPITAL ENCOUNTER (OUTPATIENT)
Dept: LAB | Facility: MEDICAL CENTER | Age: 60
End: 2020-05-12
Attending: FAMILY MEDICINE
Payer: COMMERCIAL

## 2020-05-12 DIAGNOSIS — E03.9 ACQUIRED HYPOTHYROIDISM: ICD-10-CM

## 2020-05-12 DIAGNOSIS — E11.9 TYPE 2 DIABETES MELLITUS WITHOUT COMPLICATION, WITHOUT LONG-TERM CURRENT USE OF INSULIN (HCC): ICD-10-CM

## 2020-05-12 LAB
EST. AVERAGE GLUCOSE BLD GHB EST-MCNC: 157 MG/DL
HBA1C MFR BLD: 7.1 % (ref 0–5.6)
TSH SERPL DL<=0.005 MIU/L-ACNC: 2.77 UIU/ML (ref 0.38–5.33)

## 2020-05-12 PROCEDURE — 84443 ASSAY THYROID STIM HORMONE: CPT

## 2020-05-12 PROCEDURE — 36415 COLL VENOUS BLD VENIPUNCTURE: CPT

## 2020-05-12 PROCEDURE — 83036 HEMOGLOBIN GLYCOSYLATED A1C: CPT

## 2020-05-15 ENCOUNTER — OFFICE VISIT (OUTPATIENT)
Dept: MEDICAL GROUP | Age: 60
End: 2020-05-15
Payer: COMMERCIAL

## 2020-05-15 VITALS
BODY MASS INDEX: 23.9 KG/M2 | WEIGHT: 140 LBS | OXYGEN SATURATION: 98 % | SYSTOLIC BLOOD PRESSURE: 130 MMHG | TEMPERATURE: 97.6 F | HEART RATE: 81 BPM | HEIGHT: 64 IN | DIASTOLIC BLOOD PRESSURE: 60 MMHG

## 2020-05-15 DIAGNOSIS — E78.00 PURE HYPERCHOLESTEROLEMIA: ICD-10-CM

## 2020-05-15 DIAGNOSIS — Z00.00 PE (PHYSICAL EXAM), ANNUAL: ICD-10-CM

## 2020-05-15 DIAGNOSIS — E03.9 ACQUIRED HYPOTHYROIDISM: ICD-10-CM

## 2020-05-15 DIAGNOSIS — E11.9 TYPE 2 DIABETES MELLITUS WITHOUT COMPLICATION, WITHOUT LONG-TERM CURRENT USE OF INSULIN (HCC): Primary | ICD-10-CM

## 2020-05-15 PROCEDURE — 99214 OFFICE O/P EST MOD 30 MIN: CPT | Performed by: FAMILY MEDICINE

## 2020-05-15 RX ORDER — GLIMEPIRIDE 2 MG/1
2 TABLET ORAL EVERY MORNING
Qty: 90 TAB | Refills: 1 | Status: SHIPPED | OUTPATIENT
Start: 2020-05-15 | End: 2020-12-01 | Stop reason: SDUPTHER

## 2020-05-15 RX ORDER — LEVOTHYROXINE SODIUM 0.07 MG/1
75 TABLET ORAL
Qty: 90 TAB | Refills: 1 | Status: SHIPPED | OUTPATIENT
Start: 2020-05-15 | End: 2020-11-30

## 2020-05-15 ASSESSMENT — FIBROSIS 4 INDEX: FIB4 SCORE: 0.55

## 2020-05-16 NOTE — PROGRESS NOTES
Subjective:   CC: DM follow up     HPI:     Shona Garcia is a 59 y.o. female, established patient of the clinic, presents with the following concerns:     Pt has hx of type 2 DM, acquired hypothyroidism, HLD. She was initially resistant to pharmacotherapy, but recent agreed to start Glimepiride 2 mg qd. She tolerates medication well, no s/e reported. She is working on diet and modifying her lifestyle to control her blood sugar.     HLD is controlled with diet. She declined pharmacotherapy.     She takes 75 mcg of Levothyroxine. Her most recent thyroid function test was normal.       Current medicines (including changes today)  Current Outpatient Medications   Medication Sig Dispense Refill   • levothyroxine (SYNTHROID) 75 MCG Tab Take 1 Tab by mouth every day. 90 Tab 1   • glimepiride (AMARYL) 2 MG Tab Take 1 Tab by mouth every morning. 90 Tab 1   • ONE TOUCH ULTRASOFT LANCETS Misc Check fasting blood sugar once daily or as needed for symptoms of low/high blood sugar. 100 Each 1   • glucose blood (ONETOUCH VERIO) strip Check fasting blood sugar once daily or as needed for symptoms of low/high blood sugar. 100 Strip 1   • hydrocortisone rectal (PROCTOZONE HC) 2.5 % Cream Apply sparingly up to two times daily 30 g 1   • Diclofenac Sodium 1 % Gel Apply to 2-4 gram to affected area 4 times daily 100 g 1   • triamcinolone acetonide (KENALOG) 0.1 % Cream Apply to affected area twice daily 1 Tube 2   • Probiotic Product (PROBIOTIC ADVANCED PO) Take  by mouth.     • Omega-3 Fatty Acids (OMEGA 3 PO) Take  by mouth.     • vitamin D (CHOLECALCIFEROL) 1000 UNIT TABS Take 2,000 Units by mouth every day.       No current facility-administered medications for this visit.      She  has a past medical history of Allergy, Anemia, Blood transfusion (1986), Diabetes mellitus type II (4/18/2011), Dyslipidemia (8/28/2013), GERD (gastroesophageal reflux disease), Headache(784.0), Heart murmur, Measles, Pyelonephritis  "(2002), S/P colonoscopy (2010), Thyroid disease, Uterine fibroid (1986), Uterine fibroid, and Varicella. She also has no past medical history of Addisons disease (HCC), Adrenal disorder (HCC), Anxiety, Arrhythmia, ASTHMA, CATARACT, CHF (congestive heart failure) (HCC), Clotting disorder (HCC), COPD, Cushings syndrome (HCC), Depression, EMPHYSEMA, Glaucoma, Goiter, Heart attack (HCC), Hypertension, IBD (inflammatory bowel disease), Meningitis, Migraine, Muscle disorder, OSTEOPOROSIS, Parathyroid disorder (HCC), Pituitary disease (HCC), Seizure (HCC), Stroke (HCC), Substance abuse (HCC), Tuberculosis, Ulcer, or Urinary tract infection, site not specified.    I personally reviewed patient's problem list, allergies, medications, family hx, social hx with patient and update EPIC.     REVIEW OF SYSTEMS:  CONSTITUTIONAL:  Denies night sweats, fatigue, malaise, lethargy, fever or chills.  RESPIRATORY:  Denies cough, wheeze, hemoptysis, or shortness of breath.  CARDIOVASCULAR:  Denies chest pains, palpitations, pedal edema     Objective:     /60 (BP Location: Right arm, Patient Position: Sitting, BP Cuff Size: Adult)   Pulse 81   Temp 36.4 °C (97.6 °F) (Temporal)   Ht 1.626 m (5' 4\")   Wt 63.5 kg (140 lb)   SpO2 98%  Body mass index is 24.03 kg/m².    Physical Exam:  Constitutional: awake, alert, in no distress.  Skin: Warm, dry, good turgor, no rashes, bruises, ulcers in visible areas.  Neck: Trachea midline, no masses, no thyromegaly. No cervical or supraclavicular lymphadenopathy  Respiratory: Unlabored respiratory effort, lungs clear to auscultation, no wheezes, no rales.  Cardiovascular: Normal S1, S2, no murmur, no pedal edema.  Psych: Oriented x3, affect and mood wnl, intact judgement and insight.     Monofilament testing with a 10 gram force: sensation intact: intact bilaterally  Visual Inspection: Feet without maceration, ulcers, fissures.  Pedal pulses: intact bilaterally     Assessment and Plan:   The " following treatment plan was discussed    1. Type 2 diabetes mellitus without complication, without long-term current use of insulin (HCC)  Chronic, currently taking Glimepiride 2 mg qd, no s/e reported, her A1C improves from 7.8 to 7.1.  - glimepiride (AMARYL) 2 MG Tab; Take 1 Tab by mouth every morning.  Dispense: 90 Tab; Refill: 1  - HEMOGLOBIN A1C; Future  - Diabetic Monofilament Lower Extremity Exam    2. Acquired hypothyroidism  Chronic, controlled with Levothyroxine 75 mg qd, no s/e reported, will continue.    - levothyroxine (SYNTHROID) 75 MCG Tab; Take 1 Tab by mouth every day.  Dispense: 90 Tab; Refill: 1  - TSH; Future    3. Pure hypercholesterolemia_declines med  Diet controlled, declined meds.   - recommended dietary modification, exercise, and weight loss.    - Lipid Profile; Future      Ly ALEJANDRA Hall M.D.      Followup: Return in about 6 months (around 11/15/2020) for Multiple issues.    Please note that this dictation was created using voice recognition software. I have made every reasonable attempt to correct obvious errors, but I expect that there are errors of grammar and possibly content that I did not discover before finalizing the note.

## 2020-06-01 DIAGNOSIS — E11.9 TYPE 2 DIABETES MELLITUS WITHOUT COMPLICATION, WITHOUT LONG-TERM CURRENT USE OF INSULIN (HCC): ICD-10-CM

## 2020-06-01 DIAGNOSIS — J30.9 ALLERGIC RHINITIS, UNSPECIFIED SEASONALITY, UNSPECIFIED TRIGGER: ICD-10-CM

## 2020-06-02 ENCOUNTER — HOSPITAL ENCOUNTER (OUTPATIENT)
Facility: MEDICAL CENTER | Age: 60
End: 2020-06-02
Payer: COMMERCIAL

## 2020-06-02 RX ORDER — BLOOD SUGAR DIAGNOSTIC
STRIP MISCELLANEOUS
Qty: 100 STRIP | Refills: 1 | Status: SHIPPED | OUTPATIENT
Start: 2020-06-02 | End: 2021-12-03

## 2020-06-02 RX ORDER — MONTELUKAST SODIUM 10 MG/1
10 TABLET ORAL DAILY
Qty: 90 TAB | Refills: 1 | Status: SHIPPED | OUTPATIENT
Start: 2020-06-02 | End: 2020-12-01 | Stop reason: SDUPTHER

## 2020-06-02 RX ORDER — LANCETS
EACH MISCELLANEOUS
Qty: 100 EACH | Refills: 1 | Status: SHIPPED | OUTPATIENT
Start: 2020-06-02 | End: 2021-01-29

## 2020-06-02 RX ORDER — AZELASTINE HYDROCHLORIDE, FLUTICASONE PROPIONATE 137; 50 UG/1; UG/1
1 SPRAY, METERED NASAL 2 TIMES DAILY
Qty: 1 BOTTLE | Refills: 2 | Status: SHIPPED | OUTPATIENT
Start: 2020-06-02 | End: 2022-03-24

## 2020-06-04 LAB
SARS-COV-2 RNA SPEC QL NAA+PROBE: NOT DETECTED
SPECIMEN SOURCE: NORMAL

## 2020-10-15 ENCOUNTER — IMMUNIZATION (OUTPATIENT)
Dept: SOCIAL WORK | Facility: CLINIC | Age: 60
End: 2020-10-15
Payer: COMMERCIAL

## 2020-10-15 DIAGNOSIS — Z23 NEED FOR VACCINATION: ICD-10-CM

## 2020-10-15 PROCEDURE — 90686 IIV4 VACC NO PRSV 0.5 ML IM: CPT | Performed by: REGISTERED NURSE

## 2020-10-15 PROCEDURE — 90471 IMMUNIZATION ADMIN: CPT | Performed by: REGISTERED NURSE

## 2020-11-13 ENCOUNTER — HOSPITAL ENCOUNTER (OUTPATIENT)
Dept: LAB | Facility: MEDICAL CENTER | Age: 60
End: 2020-11-13
Attending: FAMILY MEDICINE
Payer: COMMERCIAL

## 2020-11-13 DIAGNOSIS — Z00.00 PE (PHYSICAL EXAM), ANNUAL: ICD-10-CM

## 2020-11-13 DIAGNOSIS — E11.9 TYPE 2 DIABETES MELLITUS WITHOUT COMPLICATION, WITHOUT LONG-TERM CURRENT USE OF INSULIN (HCC): ICD-10-CM

## 2020-11-13 DIAGNOSIS — E03.9 ACQUIRED HYPOTHYROIDISM: ICD-10-CM

## 2020-11-13 DIAGNOSIS — E78.00 PURE HYPERCHOLESTEROLEMIA: ICD-10-CM

## 2020-11-13 LAB
ALBUMIN SERPL BCP-MCNC: 4.3 G/DL (ref 3.2–4.9)
ALBUMIN/GLOB SERPL: 1.4 G/DL
ALP SERPL-CCNC: 72 U/L (ref 30–99)
ALT SERPL-CCNC: 21 U/L (ref 2–50)
ANION GAP SERPL CALC-SCNC: 9 MMOL/L (ref 7–16)
AST SERPL-CCNC: 14 U/L (ref 12–45)
BASOPHILS # BLD AUTO: 0.8 % (ref 0–1.8)
BASOPHILS # BLD: 0.05 K/UL (ref 0–0.12)
BILIRUB SERPL-MCNC: 0.3 MG/DL (ref 0.1–1.5)
BUN SERPL-MCNC: 8 MG/DL (ref 8–22)
CALCIUM SERPL-MCNC: 9.4 MG/DL (ref 8.5–10.5)
CHLORIDE SERPL-SCNC: 97 MMOL/L (ref 96–112)
CHOLEST SERPL-MCNC: 155 MG/DL (ref 100–199)
CO2 SERPL-SCNC: 27 MMOL/L (ref 20–33)
CREAT SERPL-MCNC: 0.55 MG/DL (ref 0.5–1.4)
CREAT UR-MCNC: 21.13 MG/DL
EOSINOPHIL # BLD AUTO: 0.08 K/UL (ref 0–0.51)
EOSINOPHIL NFR BLD: 1.2 % (ref 0–6.9)
ERYTHROCYTE [DISTWIDTH] IN BLOOD BY AUTOMATED COUNT: 39.9 FL (ref 35.9–50)
EST. AVERAGE GLUCOSE BLD GHB EST-MCNC: 160 MG/DL
FASTING STATUS PATIENT QL REPORTED: NORMAL
GLOBULIN SER CALC-MCNC: 3 G/DL (ref 1.9–3.5)
GLUCOSE SERPL-MCNC: 143 MG/DL (ref 65–99)
HBA1C MFR BLD: 7.2 % (ref 0–5.6)
HCT VFR BLD AUTO: 38.6 % (ref 37–47)
HDLC SERPL-MCNC: 44 MG/DL
HGB BLD-MCNC: 12.8 G/DL (ref 12–16)
IMM GRANULOCYTES # BLD AUTO: 0.01 K/UL (ref 0–0.11)
IMM GRANULOCYTES NFR BLD AUTO: 0.2 % (ref 0–0.9)
LDLC SERPL CALC-MCNC: 69 MG/DL
LYMPHOCYTES # BLD AUTO: 2.47 K/UL (ref 1–4.8)
LYMPHOCYTES NFR BLD: 37.9 % (ref 22–41)
MCH RBC QN AUTO: 28.4 PG (ref 27–33)
MCHC RBC AUTO-ENTMCNC: 33.2 G/DL (ref 33.6–35)
MCV RBC AUTO: 85.8 FL (ref 81.4–97.8)
MICROALBUMIN UR-MCNC: <1.2 MG/DL
MICROALBUMIN/CREAT UR: NORMAL MG/G (ref 0–30)
MONOCYTES # BLD AUTO: 0.4 K/UL (ref 0–0.85)
MONOCYTES NFR BLD AUTO: 6.1 % (ref 0–13.4)
NEUTROPHILS # BLD AUTO: 3.5 K/UL (ref 2–7.15)
NEUTROPHILS NFR BLD: 53.8 % (ref 44–72)
NRBC # BLD AUTO: 0 K/UL
NRBC BLD-RTO: 0 /100 WBC
PLATELET # BLD AUTO: 344 K/UL (ref 164–446)
PMV BLD AUTO: 9.7 FL (ref 9–12.9)
POTASSIUM SERPL-SCNC: 4.3 MMOL/L (ref 3.6–5.5)
PROT SERPL-MCNC: 7.3 G/DL (ref 6–8.2)
RBC # BLD AUTO: 4.5 M/UL (ref 4.2–5.4)
SODIUM SERPL-SCNC: 133 MMOL/L (ref 135–145)
TRIGL SERPL-MCNC: 212 MG/DL (ref 0–149)
TSH SERPL DL<=0.005 MIU/L-ACNC: 3.17 UIU/ML (ref 0.38–5.33)
WBC # BLD AUTO: 6.5 K/UL (ref 4.8–10.8)

## 2020-11-13 PROCEDURE — 84443 ASSAY THYROID STIM HORMONE: CPT

## 2020-11-13 PROCEDURE — 82570 ASSAY OF URINE CREATININE: CPT

## 2020-11-13 PROCEDURE — 85025 COMPLETE CBC W/AUTO DIFF WBC: CPT

## 2020-11-13 PROCEDURE — 36415 COLL VENOUS BLD VENIPUNCTURE: CPT

## 2020-11-13 PROCEDURE — 83036 HEMOGLOBIN GLYCOSYLATED A1C: CPT

## 2020-11-13 PROCEDURE — 80061 LIPID PANEL: CPT

## 2020-11-13 PROCEDURE — 80053 COMPREHEN METABOLIC PANEL: CPT

## 2020-11-13 PROCEDURE — 82043 UR ALBUMIN QUANTITATIVE: CPT

## 2020-11-16 SDOH — ECONOMIC STABILITY: TRANSPORTATION INSECURITY
IN THE PAST 12 MONTHS, HAS THE LACK OF TRANSPORTATION KEPT YOU FROM MEDICAL APPOINTMENTS OR FROM GETTING MEDICATIONS?: NO

## 2020-11-16 SDOH — ECONOMIC STABILITY: HOUSING INSECURITY: IN THE LAST 12 MONTHS, HOW MANY PLACES HAVE YOU LIVED?: 1

## 2020-11-16 SDOH — ECONOMIC STABILITY: HOUSING INSECURITY
IN THE LAST 12 MONTHS, WAS THERE A TIME WHEN YOU DID NOT HAVE A STEADY PLACE TO SLEEP OR SLEPT IN A SHELTER (INCLUDING NOW)?: NO

## 2020-11-16 SDOH — HEALTH STABILITY: PHYSICAL HEALTH: ON AVERAGE, HOW MANY DAYS PER WEEK DO YOU ENGAGE IN MODERATE TO STRENUOUS EXERCISE (LIKE A BRISK WALK)?: 4 DAYS

## 2020-11-16 SDOH — HEALTH STABILITY: MENTAL HEALTH
STRESS IS WHEN SOMEONE FEELS TENSE, NERVOUS, ANXIOUS, OR CAN'T SLEEP AT NIGHT BECAUSE THEIR MIND IS TROUBLED. HOW STRESSED ARE YOU?: ONLY A LITTLE

## 2020-11-16 SDOH — ECONOMIC STABILITY: TRANSPORTATION INSECURITY
IN THE PAST 12 MONTHS, HAS LACK OF RELIABLE TRANSPORTATION KEPT YOU FROM MEDICAL APPOINTMENTS, MEETINGS, WORK OR FROM GETTING THINGS NEEDED FOR DAILY LIVING?: NO

## 2020-11-16 SDOH — ECONOMIC STABILITY: INCOME INSECURITY: IN THE LAST 12 MONTHS, WAS THERE A TIME WHEN YOU WERE NOT ABLE TO PAY THE MORTGAGE OR RENT ON TIME?: NO

## 2020-11-16 SDOH — HEALTH STABILITY: PHYSICAL HEALTH: ON AVERAGE, HOW MANY MINUTES DO YOU ENGAGE IN EXERCISE AT THIS LEVEL?: 40 MINUTES

## 2020-11-16 ASSESSMENT — SOCIAL DETERMINANTS OF HEALTH (SDOH)
HOW MANY DRINKS CONTAINING ALCOHOL DO YOU HAVE ON A TYPICAL DAY WHEN YOU ARE DRINKING: DECLINE
HOW OFTEN DO YOU HAVE A DRINK CONTAINING ALCOHOL: NEVER
WITHIN THE PAST 12 MONTHS, THE FOOD YOU BOUGHT JUST DIDN'T LAST AND YOU DIDN'T HAVE MONEY TO GET MORE: NEVER TRUE
HOW OFTEN DO YOU ATTEND CHURCH OR RELIGIOUS SERVICES?: NEVER
HOW OFTEN DO YOU ATTENT MEETINGS OF THE CLUB OR ORGANIZATION YOU BELONG TO?: NEVER
HOW OFTEN DO YOU GET TOGETHER WITH FRIENDS OR RELATIVES?: NEVER
WITHIN THE PAST 12 MONTHS, YOU WORRIED THAT YOUR FOOD WOULD RUN OUT BEFORE YOU GOT THE MONEY TO BUY MORE: NEVER TRUE
DO YOU BELONG TO ANY CLUBS OR ORGANIZATIONS SUCH AS CHURCH GROUPS UNIONS, FRATERNAL OR ATHLETIC GROUPS, OR SCHOOL GROUPS?: NO
HOW OFTEN DO YOU HAVE SIX OR MORE DRINKS ON ONE OCCASION: NEVER
IN A TYPICAL WEEK, HOW MANY TIMES DO YOU TALK ON THE PHONE WITH FAMILY, FRIENDS, OR NEIGHBORS?: MORE THAN THREE TIMES A WEEK

## 2020-11-28 DIAGNOSIS — E03.9 ACQUIRED HYPOTHYROIDISM: ICD-10-CM

## 2020-11-30 RX ORDER — LEVOTHYROXINE SODIUM 0.07 MG/1
TABLET ORAL
Qty: 90 TAB | Refills: 1 | Status: SHIPPED | OUTPATIENT
Start: 2020-11-30 | End: 2020-12-01 | Stop reason: SDUPTHER

## 2020-12-01 ENCOUNTER — TELEMEDICINE (OUTPATIENT)
Dept: MEDICAL GROUP | Age: 60
End: 2020-12-01
Payer: COMMERCIAL

## 2020-12-01 VITALS — WEIGHT: 136 LBS | TEMPERATURE: 97.6 F | HEIGHT: 64 IN | BODY MASS INDEX: 23.22 KG/M2

## 2020-12-01 DIAGNOSIS — M25.561 CHRONIC PAIN OF BOTH KNEES: ICD-10-CM

## 2020-12-01 DIAGNOSIS — J30.9 ALLERGIC RHINITIS, UNSPECIFIED SEASONALITY, UNSPECIFIED TRIGGER: ICD-10-CM

## 2020-12-01 DIAGNOSIS — G89.29 CHRONIC PAIN OF BOTH KNEES: ICD-10-CM

## 2020-12-01 DIAGNOSIS — Z00.00 PE (PHYSICAL EXAM), ANNUAL: Primary | ICD-10-CM

## 2020-12-01 DIAGNOSIS — M25.562 CHRONIC PAIN OF BOTH KNEES: ICD-10-CM

## 2020-12-01 DIAGNOSIS — E78.00 PURE HYPERCHOLESTEROLEMIA: ICD-10-CM

## 2020-12-01 DIAGNOSIS — E11.9 TYPE 2 DIABETES MELLITUS WITHOUT COMPLICATION, WITHOUT LONG-TERM CURRENT USE OF INSULIN (HCC): ICD-10-CM

## 2020-12-01 DIAGNOSIS — E03.9 ACQUIRED HYPOTHYROIDISM: ICD-10-CM

## 2020-12-01 DIAGNOSIS — Z23 NEED FOR VACCINATION: ICD-10-CM

## 2020-12-01 PROCEDURE — 99396 PREV VISIT EST AGE 40-64: CPT | Mod: 95,CR | Performed by: FAMILY MEDICINE

## 2020-12-01 RX ORDER — GLIMEPIRIDE 2 MG/1
2 TABLET ORAL EVERY MORNING
Qty: 90 TAB | Refills: 1 | Status: SHIPPED | OUTPATIENT
Start: 2020-12-01 | End: 2021-05-20

## 2020-12-01 RX ORDER — LEVOTHYROXINE SODIUM 0.07 MG/1
75 TABLET ORAL
Qty: 90 TAB | Refills: 1 | Status: SHIPPED | OUTPATIENT
Start: 2020-12-01 | End: 2021-06-10 | Stop reason: SDUPTHER

## 2020-12-01 RX ORDER — MONTELUKAST SODIUM 10 MG/1
10 TABLET ORAL DAILY
Qty: 90 TAB | Refills: 1 | Status: SHIPPED | OUTPATIENT
Start: 2020-12-01 | End: 2022-03-24

## 2020-12-01 ASSESSMENT — FIBROSIS 4 INDEX: FIB4 SCORE: 0.54

## 2020-12-02 NOTE — PROGRESS NOTES
Virtual Visit: Established Patient   This visit was conducted via Zoom using secure and encrypted videoconferencing technology. The patient was in a private location in the state of [place].    The patient's identity was confirmed and verbal consent was obtained for this virtual visit.    Subjective:   CC: Annual physical, worsening bilateral knee pain    Shona Garcia is a 60 y.o. female with chronic type 2 diabetes, and mild hyperlipidemia.  Patient is currently taking glimepiride 2 mg daily.  Her most recent A1c was 7.2.  She tolerates medication well, no side effect reported.  She does not take medication for hyperlipidemia.  Patient has been working on dietary and lifestyle modification to control her lipid.  Her most recent blood tests show normal total cholesterol and LDL.  Her triglyceride is mildly elevated.  Home blood pressure has been within normal limits.  Allergic rhinitis is under good control with Singulair.  Patient has been taking 75 mcg of levothyroxine daily.  Her most recent thyroid function test was normal.    Patient has history of chronic bilateral knee pain secondary to osteoarthritis.  Patient complains of acute worsening of bilateral knee pain over the past few weeks.  Pain is radiating distally to her calves and proximally to her thighs bilaterally.  Negative history of trauma.  She also complains of sensation of catching/locking with ambulation.  Patient has been taking over-the-counter Tylenol/NSAIDs as needed for pain.  However, her symptoms are only partially controlled.  Negative fever, chills, knee swelling, erythema, edema.    ROS   Denies any recent fevers or chills. No nausea or vomiting. No chest pains or shortness of breath.     Allergies   Allergen Reactions   • Pcn [Penicillins]        Current medicines (including changes today)  Current Outpatient Medications   Medication Sig Dispense Refill   • glimepiride (AMARYL) 2 MG Tab Take 1 Tab by mouth every morning.  90 Tab 1   • levothyroxine (SYNTHROID) 75 MCG Tab Take 1 Tab by mouth Every morning on an empty stomach. 90 Tab 1   • montelukast (SINGULAIR) 10 MG Tab Take 1 Tab by mouth every day. 90 Tab 1   • Diclofenac Sodium 1 % Gel Apply to 2-4 gram to affected area 4 times daily 100 g 1   • glucose blood (ONETOUCH VERIO) strip Check fasting blood sugar once daily or as needed for symptoms of low/high blood sugar. 100 Strip 1   • ONE TOUCH ULTRASOFT LANCETS Misc Check fasting blood sugar once daily or as needed for symptoms of low/high blood sugar. 100 Each 1   • Azelastine-Fluticasone 137-50 MCG/ACT Suspension Spray 1 Spray in nose 2 Times a Day. 1 Bottle 2   • hydrocortisone rectal (PROCTOZONE HC) 2.5 % Cream Apply sparingly up to two times daily 30 g 1   • triamcinolone acetonide (KENALOG) 0.1 % Cream Apply to affected area twice daily 1 Tube 2   • Omega-3 Fatty Acids (OMEGA 3 PO) Take  by mouth.     • vitamin D (CHOLECALCIFEROL) 1000 UNIT TABS Take 2,000 Units by mouth every day.       No current facility-administered medications for this visit.        Patient Active Problem List    Diagnosis Date Noted   • Chronic pain of both knees 02/04/2020   • Rosacea 07/19/2018   • Type 2 diabetes mellitus without complication, without long-term current use of insulin ([facility]) 07/14/2017   • Pure hypercholesterolemia_declines med 08/28/2013   • Fibroid uterus_Dr. Clancy 09/14/2010   • Hypothyroidism 09/14/2010       Family History   Problem Relation Age of Onset   • Hypertension Mother    • Diabetes Father    • Heart Disease Sister         Congenital Valve disease   • Cancer Maternal Aunt         Lung       She  has a past medical history of Allergy, Anemia, Blood transfusion (1986), Diabetes mellitus type II (4/18/2011), Dyslipidemia (8/28/2013), GERD (gastroesophageal reflux disease), Headache(784.0), Heart murmur, Measles, Pyelonephritis (2002), S/P colonoscopy (2010), Thyroid disease, Uterine fibroid (1986), Uterine fibroid, and  "Varicella. She also has no past medical history of Addisons disease (HCC), Adrenal disorder (HCC), Anxiety, Arrhythmia, ASTHMA, CATARACT, CHF (congestive heart failure) ([facility]), Clotting disorder (HCC), COPD, Cushings syndrome (HCC), Depression, EMPHYSEMA, Glaucoma, Goiter, Heart attack (HCC), Hypertension, IBD (inflammatory bowel disease), Meningitis, Migraine, Muscle disorder, OSTEOPOROSIS, Parathyroid disorder (HCC), Pituitary disease (HCC), Seizure (HCC), Stroke (HCC), Substance abuse (HCC), Tuberculosis, Ulcer, or Urinary tract infection, site not specified.  She  has a past surgical history that includes primary c section; tube & ectopic preg., removal (1989); and lung biopsy open (1995).       Objective:   Temp 36.4 °C (97.6 °F) (Temporal) Comment: pt stated  Ht 1.626 m (5' 4\")   Wt 61.7 kg (136 lb) Comment: pt stated  LMP 08/20/2011   BMI 23.34 kg/m²     Physical Exam:  Constitutional: Alert, no distress, well-groomed.  Skin: No rashes in visible areas.  Eye: Round. Conjunctiva clear, lids normal. No icterus.   ENMT: Lips pink without lesions, good dentition, moist mucous membranes. Phonation normal.  Neck: No masses, no thyromegaly. Moves freely without pain.  Respiratory: Unlabored respiratory effort, no cough or audible wheeze  Psych: Alert and oriented x3, normal affect and mood.       Assessment and Plan:   The following treatment plan was discussed:     1. Acquired hypothyroidism  Chronic, controlled with Levothyroxine 75 mcg qd, normal TFT in 11/2020, no s/e reported, will continue.    - levothyroxine (SYNTHROID) 75 MCG Tab; Take 1 Tab by mouth Every morning on an empty stomach.  Dispense: 90 Tab; Refill: 1    2. Pure hypercholesterolemia_declines med  Controlled with diet and lifestyle  Declined treatment     3. Type 2 diabetes mellitus without complication, without long-term current use of insulin ([facility])  Chronic, takes Glimepiride 2 mg qd and tolerates it well.   Most recent A1C was 7.2   - " glimepiride (AMARYL) 2 MG Tab; Take 1 Tab by mouth every morning.  Dispense: 90 Tab; Refill: 1  - HEMOGLOBIN A1C; in 3  months  - HEMOGLOBIN A1C; in 6 months     4. Chronic pain of both knees  Chronic bilateral knee pain secondary to OA per X-ray done couple years ago. She c/o worsening knee pain w/o hx of trauma.   - DX-KNEE 3 VIEWS LEFT; Future  - DX-KNEE 3 VIEWS RIGHT; Future  - Diclofenac Sodium 1 % Gel; Apply to 2-4 gram to affected area 4 times daily  Dispense: 100 g; Refill: 1  - discussed treatment options, but pt declined. She wants to stay with conservative treatment. She has been taking Move Free for 2 weeks. She is planning to start Tumeric as well. She wants to see if these treatment will help her. She will f/u with me as needed.     5. Allergic rhinitis, unspecified seasonality, unspecified trigger  - montelukast (SINGULAIR) 10 MG Tab; Take 1 Tab by mouth every day.  Dispense: 90 Tab; Refill: 1    6.  PE (physical exam), annual  General health and wellness counseling provided.      Flu vaccine UTD         Follow-up: Return in about 6 months (around 6/1/2021) for Multiple issues.

## 2021-01-28 DIAGNOSIS — E11.9 TYPE 2 DIABETES MELLITUS WITHOUT COMPLICATION, WITHOUT LONG-TERM CURRENT USE OF INSULIN (HCC): ICD-10-CM

## 2021-01-29 RX ORDER — LANCETS 33 GAUGE
EACH MISCELLANEOUS
Qty: 100 EACH | Refills: 5 | Status: SHIPPED | OUTPATIENT
Start: 2021-01-29 | End: 2022-02-15

## 2021-03-15 DIAGNOSIS — Z23 NEED FOR VACCINATION: ICD-10-CM

## 2021-03-19 ENCOUNTER — IMMUNIZATION (OUTPATIENT)
Dept: FAMILY PLANNING/WOMEN'S HEALTH CLINIC | Facility: IMMUNIZATION CENTER | Age: 61
End: 2021-03-19
Attending: INTERNAL MEDICINE
Payer: COMMERCIAL

## 2021-03-19 DIAGNOSIS — Z23 NEED FOR VACCINATION: ICD-10-CM

## 2021-03-19 DIAGNOSIS — Z23 ENCOUNTER FOR VACCINATION: Primary | ICD-10-CM

## 2021-03-19 PROCEDURE — 0011A MODERNA SARS-COV-2 VACCINE: CPT

## 2021-03-19 PROCEDURE — 91301 MODERNA SARS-COV-2 VACCINE: CPT

## 2021-05-19 DIAGNOSIS — E11.9 TYPE 2 DIABETES MELLITUS WITHOUT COMPLICATION, WITHOUT LONG-TERM CURRENT USE OF INSULIN (HCC): ICD-10-CM

## 2021-05-20 RX ORDER — GLIMEPIRIDE 2 MG/1
TABLET ORAL
Qty: 90 TABLET | Refills: 3 | Status: SHIPPED
Start: 2021-05-20 | End: 2021-09-23 | Stop reason: SDUPTHER

## 2021-06-04 ENCOUNTER — HOSPITAL ENCOUNTER (OUTPATIENT)
Dept: LAB | Facility: MEDICAL CENTER | Age: 61
End: 2021-06-04
Attending: FAMILY MEDICINE
Payer: COMMERCIAL

## 2021-06-04 DIAGNOSIS — E11.9 TYPE 2 DIABETES MELLITUS WITHOUT COMPLICATION, WITHOUT LONG-TERM CURRENT USE OF INSULIN (HCC): ICD-10-CM

## 2021-06-04 DIAGNOSIS — Z00.00 PE (PHYSICAL EXAM), ANNUAL: ICD-10-CM

## 2021-06-04 LAB
EST. AVERAGE GLUCOSE BLD GHB EST-MCNC: 166 MG/DL
HBA1C MFR BLD: 7.4 % (ref 4–5.6)

## 2021-06-04 PROCEDURE — 83036 HEMOGLOBIN GLYCOSYLATED A1C: CPT

## 2021-06-04 PROCEDURE — 36415 COLL VENOUS BLD VENIPUNCTURE: CPT

## 2021-06-04 PROCEDURE — 82306 VITAMIN D 25 HYDROXY: CPT

## 2021-06-06 LAB — 25(OH)D3 SERPL-MCNC: 37 NG/ML (ref 30–80)

## 2021-06-10 ENCOUNTER — OFFICE VISIT (OUTPATIENT)
Dept: MEDICAL GROUP | Age: 61
End: 2021-06-10
Payer: COMMERCIAL

## 2021-06-10 VITALS
WEIGHT: 138 LBS | HEIGHT: 64 IN | OXYGEN SATURATION: 98 % | SYSTOLIC BLOOD PRESSURE: 138 MMHG | BODY MASS INDEX: 23.56 KG/M2 | TEMPERATURE: 97.6 F | DIASTOLIC BLOOD PRESSURE: 70 MMHG | HEART RATE: 76 BPM

## 2021-06-10 DIAGNOSIS — Z00.00 PE (PHYSICAL EXAM), ANNUAL: Primary | ICD-10-CM

## 2021-06-10 DIAGNOSIS — Z23 NEED FOR VACCINATION: ICD-10-CM

## 2021-06-10 DIAGNOSIS — Z12.12 SCREENING FOR COLORECTAL CANCER: ICD-10-CM

## 2021-06-10 DIAGNOSIS — E78.1 PURE HYPERTRIGLYCERIDEMIA: ICD-10-CM

## 2021-06-10 DIAGNOSIS — Z12.31 ENCOUNTER FOR SCREENING MAMMOGRAM FOR BREAST CANCER: ICD-10-CM

## 2021-06-10 DIAGNOSIS — E03.9 ACQUIRED HYPOTHYROIDISM: ICD-10-CM

## 2021-06-10 DIAGNOSIS — Z12.11 SCREENING FOR COLORECTAL CANCER: ICD-10-CM

## 2021-06-10 DIAGNOSIS — E11.9 TYPE 2 DIABETES MELLITUS WITHOUT COMPLICATION, WITHOUT LONG-TERM CURRENT USE OF INSULIN (HCC): ICD-10-CM

## 2021-06-10 PROCEDURE — 99396 PREV VISIT EST AGE 40-64: CPT | Performed by: FAMILY MEDICINE

## 2021-06-10 RX ORDER — LEVOTHYROXINE SODIUM 0.07 MG/1
75 TABLET ORAL
Qty: 90 TABLET | Refills: 1 | Status: SHIPPED | OUTPATIENT
Start: 2021-06-10 | End: 2021-09-23 | Stop reason: SDUPTHER

## 2021-06-10 ASSESSMENT — PATIENT HEALTH QUESTIONNAIRE - PHQ9: CLINICAL INTERPRETATION OF PHQ2 SCORE: 0

## 2021-06-10 ASSESSMENT — FIBROSIS 4 INDEX: FIB4 SCORE: 0.54

## 2021-06-11 NOTE — PROGRESS NOTES
Subjective:   CC: annual PE     HPI:     Shona Garcia is a 60 y.o. female, established patient of the clinic, presents with the following concerns:     Patient has chronic type 2 diabetes, hypertriglyceridemia, hypothyroidism.  Patient is taking all medications as directed.  She tolerates them well, no side effect reported.  Her A1c increases from 7.2 to 7.4 per most recent labs.  Patient has not been compliant to diabetic diet.  She is active, but does not exercise regularly.  She denies any visual changes, symptoms of polyneuropathy, concerning lesion on her feet.  She is due for mammogram and colorectal cancer screening.  Negative history of drugs, alcohol, tobacco abuse.    Current medicines (including changes today)  Current Outpatient Medications   Medication Sig Dispense Refill   • levothyroxine (SYNTHROID) 75 MCG Tab Take 1 tablet by mouth every morning on an empty stomach. 90 tablet 1   • glimepiride (AMARYL) 2 MG Tab TAKE 1 TABLET BY MOUTH ONCE DAILY IN THE MORNING 90 tablet 3   • Lancets (ONETOUCH DELICA PLUS [id number]) Misc USE LANCET TO CHECK GLUCOSE ONCE DAILY OR  AS  NEEDED  FOR  SYMPTOMS  OF  LOW/HIGH  BLOOD  SUGAR 100 Each 5   • montelukast (SINGULAIR) 10 MG Tab Take 1 Tab by mouth every day. 90 Tab 1   • Diclofenac Sodium 1 % Gel Apply to 2-4 gram to affected area 4 times daily 100 g 1   • glucose blood (ONETOUCH VERIO) strip Check fasting blood sugar once daily or as needed for symptoms of low/high blood sugar. 100 Strip 1   • Azelastine-Fluticasone 137-50 MCG/ACT Suspension Spray 1 Spray in nose 2 Times a Day. 1 Bottle 2   • hydrocortisone rectal (PROCTOZONE HC) 2.5 % Cream Apply sparingly up to two times daily 30 g 1   • triamcinolone acetonide (KENALOG) 0.1 % Cream Apply to affected area twice daily 1 Tube 2   • Omega-3 Fatty Acids (OMEGA 3 PO) Take  by mouth.     • vitamin D (CHOLECALCIFEROL) 1000 UNIT TABS Take 2,000 Units by mouth every day.       No current  "facility-administered medications for this visit.     She  has a past medical history of Allergy, Anemia, Blood transfusion (1986), Diabetes mellitus type II (4/18/2011), Dyslipidemia (8/28/2013), GERD (gastroesophageal reflux disease), Headache(784.0), Heart murmur, Measles, Pyelonephritis (2002), S/P colonoscopy (2010), Thyroid disease, Uterine fibroid (1986), Uterine fibroid, and Varicella. She also has no past medical history of Addisons disease (HCC), Adrenal disorder (HCC), Anxiety, Arrhythmia, ASTHMA, CATARACT, CHF (congestive heart failure) (HCC), Clotting disorder (HCC), COPD, Cushings syndrome (HCC), Depression, EMPHYSEMA, Glaucoma, Goiter, Heart attack (HCC), Hypertension, IBD (inflammatory bowel disease), Meningitis, Migraine, Muscle disorder, OSTEOPOROSIS, Parathyroid disorder (HCC), Pituitary disease (HCC), Seizure (HCC), Stroke (HCC), Substance abuse (HCC), Tuberculosis, Ulcer, or Urinary tract infection, site not specified.    I personally reviewed patient's problem list, allergies, medications, family hx, social hx with patient and update EPIC.     REVIEW OF SYSTEMS:  CONSTITUTIONAL:  Denies night sweats, fatigue, malaise, lethargy, fever or chills.  RESPIRATORY:  Denies cough, wheeze, hemoptysis, or shortness of breath.  CARDIOVASCULAR:  Denies chest pains, palpitations, pedal edema     Objective:     /70 (BP Location: Right arm, Patient Position: Sitting, BP Cuff Size: Adult)   Pulse 76   Temp 36.4 °C (97.6 °F) (Temporal)   Ht 1.626 m (5' 4\")   Wt 62.6 kg (138 lb)   SpO2 98%  Body mass index is 23.69 kg/m².    Physical Exam:  Constitutional: awake, alert, in no distress.  Skin: Warm, dry, good turgor, no rashes, bruises, ulcers in visible areas.  Eye: conjunctiva clear, lids neg for edema or lesions.  ENMT: TM and auditory canals wnl. Neck: Trachea midline, no masses, no thyromegaly. No cervical or supraclavicular lymphadenopathy  Respiratory: Unlabored respiratory effort, lungs clear " to auscultation, no wheezes, no rales.  Cardiovascular: Normal S1, S2, no murmur, no pedal edema.  Abdomen: Soft, non-tender to palpation, active BS, no hernia, no hepatosplenomegaly, negative rebound or guarding.   Psych: Oriented x3, affect and mood wnl, intact judgement and insight.     Monofilament testing with a 10 gram force: sensation intact: intact bilaterally  Visual Inspection: Feet without maceration, ulcers, fissures.  Pedal pulses: intact bilaterally     Assessment and Plan:   The following treatment plan was discussed    1. Type 2 diabetes mellitus without complication, without long-term current use of insulin (HCC)  Chronic, most recent A1c was 7.4, slightly increases from 7.2 a few months ago.  Patient is currently taking glimepiride 2 mg daily.  Patient has not been adhering to diabetic diet.  She is active, but does not exercise regularly.  She is not interested in escalation of pharmacotherapy at this time.  She is motivated to make changes in diet and lifestyle to achieve better glycemic control.  -Continue glimepiride 2 mg daily  - HEMOGLOBIN A1C; Future  - Comp Metabolic Panel; Future  - CBC WITH DIFFERENTIAL; Future  - MICROALBUMIN CREAT RATIO URINE; Future  - Diabetic Monofilament Lower Extremity Exam    2. Acquired hypothyroidism  Chronic, controlled with Synthroid 75 mcg daily.  - TSH; Future  - levothyroxine (SYNTHROID) 75 MCG Tab; Take 1 tablet by mouth every morning on an empty stomach.  Dispense: 90 tablet; Refill: 1    3. Pure hypertriglyceridemia  - dietary modification, exercise, and weight loss.   - declined statin  - avoid alcohol, drugs, tobacco products   - Lipid Profile; Future    4. Encounter for screening mammogram for breast cancer  - MA-SCREENING MAMMO BILAT W/CAD; Future    5. Screening for colorectal cancer  - REFERRAL TO GI FOR COLONOSCOPY    6. Need for vaccination  - Shingles Vaccine (SHINGRIX); Future    7. PE (physical exam), annual  General health and wellness  counseling provided.         Jasmin Hall M.D.      Followup: Return in about 3 months (around 9/10/2021) for Diabetes.    Please note that this dictation was created using voice recognition software. I have made every reasonable attempt to correct obvious errors, but I expect that there are errors of grammar and possibly content that I did not discover before finalizing the note.

## 2021-06-17 ENCOUNTER — NON-PROVIDER VISIT (OUTPATIENT)
Dept: MEDICAL GROUP | Age: 61
End: 2021-06-17
Payer: COMMERCIAL

## 2021-06-17 DIAGNOSIS — Z23 NEED FOR VACCINATION: ICD-10-CM

## 2021-06-17 PROCEDURE — 90750 HZV VACC RECOMBINANT IM: CPT | Performed by: FAMILY MEDICINE

## 2021-06-17 PROCEDURE — 90471 IMMUNIZATION ADMIN: CPT | Performed by: FAMILY MEDICINE

## 2021-06-17 NOTE — PROGRESS NOTES
"Shona Garcia is a 60 y.o. female here for a non-provider visit for:   SHINGRIX (Shingles)    Reason for immunization: Overdue/Provider Recommended  Immunization records indicate need for vaccine: Yes, confirmed with Epic  Minimum interval has been met for this vaccine: Yes  ABN completed: Not Indicated    VIS Dated   was given to patient: Yes  All IAC Questionnaire questions were answered \"No.\"    Patient tolerated injection and no adverse effects were observed or reported: Yes    Pt scheduled for next dose in series: No    "

## 2021-08-02 ENCOUNTER — HOSPITAL ENCOUNTER (OUTPATIENT)
Dept: RADIOLOGY | Facility: MEDICAL CENTER | Age: 61
End: 2021-08-02
Attending: FAMILY MEDICINE
Payer: COMMERCIAL

## 2021-08-02 DIAGNOSIS — Z12.31 ENCOUNTER FOR SCREENING MAMMOGRAM FOR BREAST CANCER: ICD-10-CM

## 2021-08-02 PROCEDURE — 77067 SCR MAMMO BI INCL CAD: CPT

## 2021-09-03 ENCOUNTER — HOSPITAL ENCOUNTER (OUTPATIENT)
Dept: LAB | Facility: MEDICAL CENTER | Age: 61
End: 2021-09-03
Attending: FAMILY MEDICINE
Payer: COMMERCIAL

## 2021-09-03 DIAGNOSIS — E03.9 ACQUIRED HYPOTHYROIDISM: ICD-10-CM

## 2021-09-03 DIAGNOSIS — E11.9 TYPE 2 DIABETES MELLITUS WITHOUT COMPLICATION, WITHOUT LONG-TERM CURRENT USE OF INSULIN (HCC): ICD-10-CM

## 2021-09-03 DIAGNOSIS — E78.1 PURE HYPERTRIGLYCERIDEMIA: ICD-10-CM

## 2021-09-03 LAB
ALBUMIN SERPL BCP-MCNC: 4.3 G/DL (ref 3.2–4.9)
ALBUMIN/GLOB SERPL: 1.7 G/DL
ALP SERPL-CCNC: 85 U/L (ref 30–99)
ALT SERPL-CCNC: 31 U/L (ref 2–50)
ANION GAP SERPL CALC-SCNC: 10 MMOL/L (ref 7–16)
AST SERPL-CCNC: 20 U/L (ref 12–45)
BASOPHILS # BLD AUTO: 0.7 % (ref 0–1.8)
BASOPHILS # BLD: 0.05 K/UL (ref 0–0.12)
BILIRUB SERPL-MCNC: 0.3 MG/DL (ref 0.1–1.5)
BUN SERPL-MCNC: 8 MG/DL (ref 8–22)
CALCIUM SERPL-MCNC: 9.4 MG/DL (ref 8.4–10.2)
CHLORIDE SERPL-SCNC: 100 MMOL/L (ref 96–112)
CHOLEST SERPL-MCNC: 157 MG/DL (ref 100–199)
CO2 SERPL-SCNC: 26 MMOL/L (ref 20–33)
CREAT SERPL-MCNC: 0.49 MG/DL (ref 0.5–1.4)
CREAT UR-MCNC: 16.24 MG/DL
EOSINOPHIL # BLD AUTO: 0.14 K/UL (ref 0–0.51)
EOSINOPHIL NFR BLD: 2 % (ref 0–6.9)
ERYTHROCYTE [DISTWIDTH] IN BLOOD BY AUTOMATED COUNT: 39.1 FL (ref 35.9–50)
EST. AVERAGE GLUCOSE BLD GHB EST-MCNC: 169 MG/DL
FASTING STATUS PATIENT QL REPORTED: NORMAL
GLOBULIN SER CALC-MCNC: 2.5 G/DL (ref 1.9–3.5)
GLUCOSE SERPL-MCNC: 164 MG/DL (ref 65–99)
HBA1C MFR BLD: 7.5 % (ref 4–5.6)
HCT VFR BLD AUTO: 38.6 % (ref 37–47)
HDLC SERPL-MCNC: 46 MG/DL
HGB BLD-MCNC: 13 G/DL (ref 12–16)
IMM GRANULOCYTES # BLD AUTO: 0.02 K/UL (ref 0–0.11)
IMM GRANULOCYTES NFR BLD AUTO: 0.3 % (ref 0–0.9)
LDLC SERPL CALC-MCNC: 67 MG/DL
LYMPHOCYTES # BLD AUTO: 2.29 K/UL (ref 1–4.8)
LYMPHOCYTES NFR BLD: 33.1 % (ref 22–41)
MCH RBC QN AUTO: 28.9 PG (ref 27–33)
MCHC RBC AUTO-ENTMCNC: 33.7 G/DL (ref 33.6–35)
MCV RBC AUTO: 85.8 FL (ref 81.4–97.8)
MICROALBUMIN UR-MCNC: <1.2 MG/DL
MICROALBUMIN/CREAT UR: NORMAL MG/G (ref 0–30)
MONOCYTES # BLD AUTO: 0.36 K/UL (ref 0–0.85)
MONOCYTES NFR BLD AUTO: 5.2 % (ref 0–13.4)
NEUTROPHILS # BLD AUTO: 4.06 K/UL (ref 2–7.15)
NEUTROPHILS NFR BLD: 58.7 % (ref 44–72)
NRBC # BLD AUTO: 0 K/UL
NRBC BLD-RTO: 0 /100 WBC
PLATELET # BLD AUTO: 352 K/UL (ref 164–446)
PMV BLD AUTO: 9.1 FL (ref 9–12.9)
POTASSIUM SERPL-SCNC: 4.6 MMOL/L (ref 3.6–5.5)
PROT SERPL-MCNC: 6.8 G/DL (ref 6–8.2)
RBC # BLD AUTO: 4.5 M/UL (ref 4.2–5.4)
SODIUM SERPL-SCNC: 136 MMOL/L (ref 135–145)
TRIGL SERPL-MCNC: 221 MG/DL (ref 0–149)
TSH SERPL DL<=0.005 MIU/L-ACNC: 2.14 UIU/ML (ref 0.38–5.33)
WBC # BLD AUTO: 6.9 K/UL (ref 4.8–10.8)

## 2021-09-03 PROCEDURE — 80053 COMPREHEN METABOLIC PANEL: CPT

## 2021-09-03 PROCEDURE — 36415 COLL VENOUS BLD VENIPUNCTURE: CPT

## 2021-09-03 PROCEDURE — 82043 UR ALBUMIN QUANTITATIVE: CPT

## 2021-09-03 PROCEDURE — 84443 ASSAY THYROID STIM HORMONE: CPT

## 2021-09-03 PROCEDURE — 80061 LIPID PANEL: CPT

## 2021-09-03 PROCEDURE — 83036 HEMOGLOBIN GLYCOSYLATED A1C: CPT

## 2021-09-03 PROCEDURE — 82570 ASSAY OF URINE CREATININE: CPT

## 2021-09-03 PROCEDURE — 85025 COMPLETE CBC W/AUTO DIFF WBC: CPT

## 2021-09-23 ENCOUNTER — OFFICE VISIT (OUTPATIENT)
Dept: MEDICAL GROUP | Age: 61
End: 2021-09-23
Payer: COMMERCIAL

## 2021-09-23 ENCOUNTER — HOSPITAL ENCOUNTER (OUTPATIENT)
Dept: RADIOLOGY | Facility: MEDICAL CENTER | Age: 61
End: 2021-09-23
Attending: FAMILY MEDICINE
Payer: COMMERCIAL

## 2021-09-23 VITALS
WEIGHT: 141 LBS | TEMPERATURE: 97.2 F | DIASTOLIC BLOOD PRESSURE: 70 MMHG | OXYGEN SATURATION: 99 % | SYSTOLIC BLOOD PRESSURE: 130 MMHG | HEIGHT: 63 IN | HEART RATE: 75 BPM | BODY MASS INDEX: 24.98 KG/M2

## 2021-09-23 DIAGNOSIS — E03.9 ACQUIRED HYPOTHYROIDISM: ICD-10-CM

## 2021-09-23 DIAGNOSIS — Z23 NEED FOR VACCINATION: ICD-10-CM

## 2021-09-23 DIAGNOSIS — M79.642 HAND PAIN, LEFT: ICD-10-CM

## 2021-09-23 DIAGNOSIS — M79.641 HAND PAIN, RIGHT: ICD-10-CM

## 2021-09-23 DIAGNOSIS — R01.1 HEART MURMUR: ICD-10-CM

## 2021-09-23 DIAGNOSIS — E11.9 TYPE 2 DIABETES MELLITUS WITHOUT COMPLICATION, WITHOUT LONG-TERM CURRENT USE OF INSULIN (HCC): ICD-10-CM

## 2021-09-23 DIAGNOSIS — E78.1 PURE HYPERTRIGLYCERIDEMIA: ICD-10-CM

## 2021-09-23 PROCEDURE — 90686 IIV4 VACC NO PRSV 0.5 ML IM: CPT | Performed by: FAMILY MEDICINE

## 2021-09-23 PROCEDURE — 99214 OFFICE O/P EST MOD 30 MIN: CPT | Mod: 25 | Performed by: FAMILY MEDICINE

## 2021-09-23 PROCEDURE — 73130 X-RAY EXAM OF HAND: CPT | Mod: LT

## 2021-09-23 PROCEDURE — 90471 IMMUNIZATION ADMIN: CPT | Performed by: FAMILY MEDICINE

## 2021-09-23 RX ORDER — LEVOTHYROXINE SODIUM 0.07 MG/1
75 TABLET ORAL
Qty: 90 TABLET | Refills: 3 | Status: SHIPPED | OUTPATIENT
Start: 2021-09-23 | End: 2022-06-30 | Stop reason: SDUPTHER

## 2021-09-23 RX ORDER — GLIMEPIRIDE 4 MG/1
4 TABLET ORAL EVERY MORNING
Qty: 90 TABLET | Refills: 1 | Status: SHIPPED | OUTPATIENT
Start: 2021-09-23 | End: 2022-03-29

## 2021-09-23 ASSESSMENT — FIBROSIS 4 INDEX: FIB4 SCORE: 0.63

## 2021-09-23 NOTE — LETTER
[facility]  Jasmin Hall M.D.  [address] [place] [address]  Fax: [phone]   Authorization for Release/Disclosure of   Protected Health Information   Name: SHONA KAUFMAN : 1960 SSN: [id number]   Address: [address] [place] [address] [place] [address] Phone:    [phone] (home)    I authorize the entity listed below to release/disclose the PHI below to:   [facility]/Jasmin Hall M.D. and Jasmin Hall M.D.   Provider or Entity Name:  [facility]   Address   City, State, Zip   Phone:      Fax:  [phone]   Reason for request: continuity of care   Information to be released:    [xx  ] LAST COLONOSCOPY,  including any PATH REPORT and follow-up  [  ] LAST FIT/COLOGUARD RESULT [  ] LAST DEXA  [  ] LAST MAMMOGRAM  [  ] LAST PAP  [  ] LAST LABS [  ] RETINA EXAM REPORT  [  ] IMMUNIZATION RECORDS  [  ] Release all info      [  ] Check here and initial the line next to each item to release ALL health information INCLUDING  _____ Care and treatment for drug and / or alcohol abuse  _____ HIV testing, infection status, or AIDS  _____ Genetic Testing    DATES OF SERVICE OR TIME PERIOD TO BE DISCLOSED: _____________  I understand and acknowledge that:  * This Authorization may be revoked at any time by you in writing, except if your health information has already been used or disclosed.  * Your health information that will be used or disclosed as a result of you signing this authorization could be re-disclosed by the recipient. If this occurs, your re-disclosed health information may no longer be protected by State or Federal laws.  * You may refuse to sign this Authorization. Your refusal will not affect your ability to obtain treatment.  * This Authorization becomes effective upon signing and will  on (date) __________.      If no date is indicated, this Authorization will  one (1) year from the signature date.    Name: Shona Kaufman    Signature:   Date:     2021       PLEASE FAX  REQUESTED RECORDS BACK TO: [phone]

## 2021-09-23 NOTE — LETTER
September 23, 2021        Re: Shona Garcia    To whom it may concern,    My name is Jasmin Hall MD. I am taking care of Shona Garcia, who has chronic bilateral knee pain. She is not able to take stairs because of her symptoms. Please allow her to take elevators/escalators instead.     If you have any questions, please do not hesitate to call my office.     Best regards,              Jasmin Hall M.D.

## 2021-09-23 NOTE — PROGRESS NOTES
Subjective:   CC: diabetes follow up     HPI:     Shona Garcia is a 61 y.o. female, established patient of the clinic.    Patient has chronic type 2 diabetes, hypothyroidism, hyperlipidemia. She has been taking all medications as directed. She tolerates them well, no side effects reported.  Negative visual changes, symptoms of polyneuropathy. Her A1C increases from 7.4 to 7.5. there is no changes in diet or physical activities. However, she states that her diet could improve.     A few weeks ago, something fell on her left hand. She complained of discomfort at the left 1st metacarpal bone whenever she applies pressure to this area. She is not able to make grab objects firmly with her left hand. There is no redness, edema, hematoma, or skin changes of the concerned area.     Current medicines (including changes today)  Current Outpatient Medications   Medication Sig Dispense Refill   • levothyroxine (SYNTHROID) 75 MCG Tab Take 1 Tablet by mouth every morning on an empty stomach. 90 Tablet 3   • glimepiride (AMARYL) 4 MG Tab Take 1 Tablet by mouth every morning. 90 Tablet 1   • Lancets (ONETOUCH DELICA PLUS [id number]) Misc USE LANCET TO CHECK GLUCOSE ONCE DAILY OR  AS  NEEDED  FOR  SYMPTOMS  OF  LOW/HIGH  BLOOD  SUGAR 100 Each 5   • montelukast (SINGULAIR) 10 MG Tab Take 1 Tab by mouth every day. 90 Tab 1   • Diclofenac Sodium 1 % Gel Apply to 2-4 gram to affected area 4 times daily 100 g 1   • glucose blood (ONETOUCH VERIO) strip Check fasting blood sugar once daily or as needed for symptoms of low/high blood sugar. 100 Strip 1   • Azelastine-Fluticasone 137-50 MCG/ACT Suspension Spray 1 Spray in nose 2 Times a Day. 1 Bottle 2   • hydrocortisone rectal (PROCTOZONE HC) 2.5 % Cream Apply sparingly up to two times daily 30 g 1   • triamcinolone acetonide (KENALOG) 0.1 % Cream Apply to affected area twice daily 1 Tube 2   • Omega-3 Fatty Acids (OMEGA 3 PO) Take  by mouth.     • vitamin D (CHOLECALCIFEROL)  "1000 UNIT TABS Take 2,000 Units by mouth every day.       No current facility-administered medications for this visit.     She  has a past medical history of Allergy, Anemia, Blood transfusion (1986), Diabetes mellitus type II (4/18/2011), Dyslipidemia (8/28/2013), GERD (gastroesophageal reflux disease), Headache(784.0), Heart murmur, Measles, Pyelonephritis (2002), S/P colonoscopy (2010), Thyroid disease, Uterine fibroid (1986), Uterine fibroid, and Varicella. She also has no past medical history of Addisons disease (HCC), Adrenal disorder (HCC), Anxiety, Arrhythmia, ASTHMA, CATARACT, CHF (congestive heart failure) ([facility]), Clotting disorder (HCC), COPD, Cushings syndrome (HCC), Depression, EMPHYSEMA, Glaucoma, Goiter, Heart attack (HCC), Hypertension, IBD (inflammatory bowel disease), Meningitis, Migraine, Muscle disorder, OSTEOPOROSIS, Parathyroid disorder (HCC), Pituitary disease (HCC), Seizure (HCC), Stroke ([facility]), Substance abuse (HCC), Tuberculosis, Ulcer, or Urinary tract infection, site not specified.    I personally reviewed patient's problem list, allergies, medications, family hx, social hx with patient and update EPIC.     REVIEW OF SYSTEMS:  CONSTITUTIONAL:  Denies night sweats, fatigue, malaise, lethargy, fever or chills.  RESPIRATORY:  Denies cough, wheeze, hemoptysis, or shortness of breath.  CARDIOVASCULAR:  Denies chest pains, palpitations, pedal edema     Objective:     /70 (BP Location: Right arm, Patient Position: Sitting, BP Cuff Size: Adult)   Pulse 75   Temp 36.2 °C (97.2 °F) (Temporal)   Ht 1.6 m (5' 3\")   Wt 64 kg (141 lb)   SpO2 99%  Body mass index is 24.98 kg/m².    Physical Exam:  Constitutional: awake, alert, in no distress.  Skin: Warm, dry, good turgor, no rashes, bruises, ulcers in visible areas.  Eye: conjunctiva clear, lids neg for edema or lesions.  Neck: Trachea midline, no masses, no thyromegaly. No cervical or supraclavicular lymphadenopathy  Respiratory: Unlabored " respiratory effort, lungs clear to auscultation, no wheezes, no rales.  Cardiovascular: Normal S1, S2, systolic ejection murmur best heard at the left upper sternum, no pedal edema.  Neuro: CN2-12 grossly intact. Strength 5/5, reflexes 2/4 in all extremities, no sensory deficits.   Psych: Oriented x3, affect and mood wnl, intact judgement and insight.   MSK: mild edema of the soft tissue overlying the left 1st metacarpal bone, negative rash/skin changes, redness, or bony tenderness to palpation. The rest of hand exam within normal limits     Assessment and Plan:   The following treatment plan was discussed    1. Type 2 diabetes mellitus without complication, without long-term current use of insulin (HCC)  Chronic, A1C increases from 7.4 to 7.5 despite compliant to Glimepiride 2 mg qd. There is not changes in diet or physical activities though she endorses her diet could improve.   - glimepiride (AMARYL) 4 MG Tab; Take 1 Tablet by mouth every morning.  Dispense: 90 Tablet; Refill: 1  - HEMOGLOBIN A1C; in 3 months.   - dietary modification, exercise, weight loss  - Annual eye exam  - Regular foot exam  - Discussed prevention and management of hypoglycemia  - Side effects of all medications discussed with patient  - Follow up in 6 months.     2. Acquired hypothyroidism  Chronic, controlled with Levothyroxine 75 mcg qd, normal TFT in 9/2021, no s/e reported, will continue.    - levothyroxine (SYNTHROID) 75 MCG Tab; Take 1 Tablet by mouth every morning on an empty stomach.  Dispense: 90 Tablet; Refill: 3    3. Pure hypertriglyceridemia  Declined medications  Normal cholesterol per most recent labs  Mild hypertriglyceridemia   - dietary modification, exercise, and weight loss.   - avoid alcohol, drugs, tobacco products     4. Hand pain, left   History and exam are concerning for acute soft tissue injuries.   - icing, rest, activity modification  - Diclofenac gel PRN   - DX-HAND 3+ LEFT; Future    5. Heart murmur  Exam was  notable for grad 3/6 systolic ejection murmur best heard at the left upper sternum. Negative ECHO in 2017. Patient is asymptomatic.   - EC-ECHOCARDIOGRAM COMPLETE W/O CONT; Future    6. Need for vaccination  - INFLUENZA VACCINE QUAD INJ (PF)       Jasmin Hall M.D.      Followup: Return in about 6 months (around 3/23/2022) for Diabetes.    Please note that this dictation was created using voice recognition software. I have made every reasonable attempt to correct obvious errors, but I expect that there are errors of grammar and possibly content that I did not discover before finalizing the note.

## 2021-12-01 ENCOUNTER — HOSPITAL ENCOUNTER (OUTPATIENT)
Dept: CARDIOLOGY | Facility: MEDICAL CENTER | Age: 61
End: 2021-12-01
Attending: FAMILY MEDICINE
Payer: COMMERCIAL

## 2021-12-01 DIAGNOSIS — R01.1 HEART MURMUR: ICD-10-CM

## 2021-12-01 LAB
LV EJECT FRACT  99904: 65
LV EJECT FRACT MOD 2C 99903: 68.69
LV EJECT FRACT MOD 4C 99902: 69.36
LV EJECT FRACT MOD BP 99901: 68.65

## 2021-12-01 PROCEDURE — 93306 TTE W/DOPPLER COMPLETE: CPT

## 2021-12-01 PROCEDURE — 93306 TTE W/DOPPLER COMPLETE: CPT | Mod: 26 | Performed by: INTERNAL MEDICINE

## 2021-12-02 DIAGNOSIS — E11.9 TYPE 2 DIABETES MELLITUS WITHOUT COMPLICATION, WITHOUT LONG-TERM CURRENT USE OF INSULIN (HCC): ICD-10-CM

## 2021-12-03 RX ORDER — BLOOD SUGAR DIAGNOSTIC
STRIP MISCELLANEOUS
Qty: 100 STRIP | Refills: 5 | Status: SHIPPED | OUTPATIENT
Start: 2021-12-03 | End: 2022-06-30 | Stop reason: SDUPTHER

## 2021-12-27 ENCOUNTER — HOSPITAL ENCOUNTER (OUTPATIENT)
Dept: LAB | Facility: MEDICAL CENTER | Age: 61
End: 2021-12-27
Attending: FAMILY MEDICINE
Payer: COMMERCIAL

## 2021-12-27 DIAGNOSIS — E11.9 TYPE 2 DIABETES MELLITUS WITHOUT COMPLICATION, WITHOUT LONG-TERM CURRENT USE OF INSULIN (HCC): ICD-10-CM

## 2021-12-27 LAB
EST. AVERAGE GLUCOSE BLD GHB EST-MCNC: 174 MG/DL
HBA1C MFR BLD: 7.7 % (ref 4–5.6)

## 2021-12-27 PROCEDURE — 83036 HEMOGLOBIN GLYCOSYLATED A1C: CPT

## 2021-12-27 PROCEDURE — 36415 COLL VENOUS BLD VENIPUNCTURE: CPT

## 2022-02-14 DIAGNOSIS — E11.9 TYPE 2 DIABETES MELLITUS WITHOUT COMPLICATION, WITHOUT LONG-TERM CURRENT USE OF INSULIN (HCC): ICD-10-CM

## 2022-02-15 RX ORDER — LANCETS 33 GAUGE
EACH MISCELLANEOUS
Qty: 100 EACH | Refills: 2 | Status: SHIPPED | OUTPATIENT
Start: 2022-02-15 | End: 2022-06-30 | Stop reason: SDUPTHER

## 2022-02-15 NOTE — TELEPHONE ENCOUNTER
Received request via: Pharmacy    Was the patient seen in the last year in this department? Yes 9/23/21    Does the patient have an active prescription (recently filled or refills available) for medication(s) requested? No

## 2022-03-22 ENCOUNTER — PATIENT MESSAGE (OUTPATIENT)
Dept: MEDICAL GROUP | Age: 62
End: 2022-03-22
Payer: COMMERCIAL

## 2022-03-24 ENCOUNTER — OFFICE VISIT (OUTPATIENT)
Dept: MEDICAL GROUP | Age: 62
End: 2022-03-24
Payer: COMMERCIAL

## 2022-03-24 VITALS
TEMPERATURE: 97.6 F | HEIGHT: 63 IN | OXYGEN SATURATION: 96 % | DIASTOLIC BLOOD PRESSURE: 72 MMHG | WEIGHT: 139 LBS | SYSTOLIC BLOOD PRESSURE: 140 MMHG | HEART RATE: 77 BPM | BODY MASS INDEX: 24.63 KG/M2

## 2022-03-24 DIAGNOSIS — E78.1 PURE HYPERTRIGLYCERIDEMIA: ICD-10-CM

## 2022-03-24 DIAGNOSIS — R03.0 ELEVATED BLOOD PRESSURE READING: ICD-10-CM

## 2022-03-24 DIAGNOSIS — E03.9 ACQUIRED HYPOTHYROIDISM: ICD-10-CM

## 2022-03-24 DIAGNOSIS — M65.312 TRIGGER FINGER OF LEFT THUMB: ICD-10-CM

## 2022-03-24 DIAGNOSIS — E11.9 TYPE 2 DIABETES MELLITUS WITHOUT COMPLICATION, WITHOUT LONG-TERM CURRENT USE OF INSULIN (HCC): ICD-10-CM

## 2022-03-24 LAB
HBA1C MFR BLD: 7.7 % (ref 0–5.6)
INT CON NEG: NEGATIVE
INT CON POS: POSITIVE

## 2022-03-24 PROCEDURE — 99215 OFFICE O/P EST HI 40 MIN: CPT | Performed by: FAMILY MEDICINE

## 2022-03-24 PROCEDURE — 83036 HEMOGLOBIN GLYCOSYLATED A1C: CPT | Performed by: FAMILY MEDICINE

## 2022-03-24 RX ORDER — AZELASTINE 1 MG/ML
2 SPRAY, METERED NASAL 2 TIMES DAILY
Qty: 30 ML | Refills: 5 | Status: SHIPPED | OUTPATIENT
Start: 2022-03-24 | End: 2023-11-27 | Stop reason: SDUPTHER

## 2022-03-24 ASSESSMENT — PATIENT HEALTH QUESTIONNAIRE - PHQ9: CLINICAL INTERPRETATION OF PHQ2 SCORE: 0

## 2022-03-24 ASSESSMENT — FIBROSIS 4 INDEX: FIB4 SCORE: 0.63

## 2022-03-24 NOTE — PROGRESS NOTES
Subjective:   CC: Diabetes follow-up    HPI:     Shona Garcia is a 61 y.o. female, established patient of the clinic.     Patient has chronic type 2 diabetes, hypertriglyceridemia, hypothyroidism.  She is taking glimepiride 4 mg daily.  She tries to adhere to diabetic diet.  She is active and tries to walk 4-5 times per week.  She denies any visual changes, concerning lesion on the feet.  She does have intermittent tingling of the toes, but not bothersome.    Blood pressure in the clinic was 140/72.  Patient states that home systolic blood pressure runs between 130-145 mmHg. diastolic blood pressure runs between 70-80 mmHg.     Patient also complains of localized tenderness of soft tissue at the base of her left thumb.  Her left thumb intermittently gets stuck in the flexed position.  Patient has to use force to extend the left thumb leading to acute pain.      Current medicines (including changes today)  Current Outpatient Medications   Medication Sig Dispense Refill   • azelastine (ASTELIN) 137 MCG/SPRAY nasal spray Administer 2 Sprays into affected nostril(S) 2 times a day. 30 mL 5   • Lancets (ONETOUCH DELICA PLUS [id number]) Misc USE LANCET TO CHECK GLUCOSE ONCE DAILY OR AS NEEDED FOR SYSMPTOMS OF LOW/HIGH BLOOD SUGAR 100 Each 2   • glucose blood (ONETOUCH VERIO) strip USE  STRIP TO CHECK GLUCOSE ONCE DAILY OR  AS  NEEDED  FOR  SYMPTOMS  OF  LOW/HIGH  BLOOD  SUGAR 100 Strip 5   • levothyroxine (SYNTHROID) 75 MCG Tab Take 1 Tablet by mouth every morning on an empty stomach. 90 Tablet 3   • glimepiride (AMARYL) 4 MG Tab Take 1 Tablet by mouth every morning. 90 Tablet 1   • Diclofenac Sodium 1 % Gel Apply to 2-4 gram to affected area 4 times daily 100 g 1   • triamcinolone acetonide (KENALOG) 0.1 % Cream Apply to affected area twice daily 1 Tube 2   • Omega-3 Fatty Acids (OMEGA 3 PO) Take  by mouth.     • vitamin D (CHOLECALCIFEROL) 1000 UNIT TABS Take 2,000 Units by mouth every day.       No  "current facility-administered medications for this visit.     She  has a past medical history of Allergy, Anemia, Blood transfusion (1986), Diabetes mellitus type II (4/18/2011), Dyslipidemia (8/28/2013), GERD (gastroesophageal reflux disease), Headache(784.0), Heart murmur, Measles, Pyelonephritis (2002), S/P colonoscopy (2010), Thyroid disease, Uterine fibroid (1986), Uterine fibroid, and Varicella.    She has no past medical history of Addisons disease (HCC), Adrenal disorder (HCC), Anxiety, Arrhythmia, ASTHMA, CATARACT, CHF (congestive heart failure) (HCC), Clotting disorder (HCC), COPD, Cushings syndrome (HCC), Depression, EMPHYSEMA, Glaucoma, Goiter, Heart attack (HCC), Hypertension, IBD (inflammatory bowel disease), Meningitis, Migraine, Muscle disorder, OSTEOPOROSIS, Parathyroid disorder (HCC), Pituitary disease (HCC), Seizure (HCC), Stroke (HCC), Substance abuse (HCC), Tuberculosis, Ulcer, or Urinary tract infection, site not specified.    I reviewed patient's problem list, allergies, medications, family hx, social hx with patient and update EPIC.        Objective:     /72 (BP Location: Right arm, Patient Position: Sitting, BP Cuff Size: Adult)   Pulse 77   Temp 36.4 °C (97.6 °F) (Temporal)   Ht 1.6 m (5' 3\")   Wt 63 kg (139 lb)   SpO2 96%  Body mass index is 24.62 kg/m².    Physical Exam:  Constitutional: awake, alert, in no distress.  Skin: Warm, dry, good turgor, no rashes, bruises, ulcers in visible areas.  Eye: conjunctiva clear, lids neg for edema or lesions.  Neck: Trachea midline, no masses, no thyromegaly. No cervical or supraclavicular lymphadenopathy  Respiratory: Unlabored respiratory effort, lungs clear to auscultation, no wheezes, no rales.  Cardiovascular: Normal S1, S2, no murmur, no pedal edema.  Abdomen: Soft, non-tender to palpation, active BS, no hernia, no hepatosplenomegaly, negative rebound or guarding.    Psych: Oriented x3, affect and mood wnl, intact judgement and " insight.   MSK: No visible deformities of the left hand and fingers.  Negative hematoma or concerning lesion.  Intact neuromuscular exam.  Tender palpable nodule along the line of the flexor digitorum superficialis proximally to the left 1st MCP joint. Intermittent fixed-flexion deformity.    Monofilament testing with a 10 gram force: sensation intact: intact bilaterally  Visual Inspection: Feet without maceration, ulcers, fissures.  Pedal pulses: intact bilaterally     Assessment and Plan:   The following treatment plan was discussed    1. Type 2 diabetes mellitus without complication, without long-term current use of insulin (HCC)  Chronic, taking glimepiride 4 mg daily consistently.  A1c remains unchanged at 7.7 compared to a few months ago.  She tolerated glimepiride well, no side effect reported.  She was not able to tolerate Metformin due to vertigo.  She adheres to diabetic diet and tries to exercise regularly.   -Patient is extremely resistant to escalation of pharmacotherapy.  She wishes to try to work on dietary and lifestyle modification to improve her blood sugar.   - CBC WITH DIFFERENTIAL; Future  - Comp Metabolic Panel; Future  - HEMOGLOBIN A1C; Future  - MICROALBUMIN CREAT RATIO URINE; Future  - POCT Hemoglobin A1C  - dietary modification, exercise, weight loss  - Annual eye exam  - Regular foot exam  - Discussed prevention and management of hypoglycemia  - Side effects of all medications discussed with patient  - Follow up in 3 months.     2. Acquired hypothyroidism  Chronic, controlled with Levothyroxine 75 mcg qd, no s/e reported, will continue.    - TSH; Future  - FREE THYROXINE; Future    3. Pure hypertriglyceridemia  Declined statin despite having type 2 diabetes.   - dietary modification, exercise, and weight loss.   - avoid alcohol, drugs, tobacco products   - Lipid Profile; Future    4. Elevated blood pressure reading  - check BP daily and keep log  - dietary modification, exercise, and  weight loss.   - avoid alcohol, drugs, tobacco products  - follow up in 3 months for blood pressure recheck (or sooner if home BP persistently elevated above 140/90).     5. Trigger finger of left thumb  History and exam are concerning for trigger finger of the left thumb.   - diclofenac gel 1%   - splint nightly and when at rest.   - activity modification   - follow up in 3 months.     Total time spent reviewing pt's chart, labs, notes, imaging, and counseling/prescribing medications to patient before, during, and after the visit: 40 minutes.        Jasmin Hall M.D.      Followup: Return in about 3 months (around 6/24/2022) for Diabetes.    Please note that this dictation was created using voice recognition software. I have made every reasonable attempt to correct obvious errors, but I expect that there are errors of grammar and possibly content that I did not discover before finalizing the note.

## 2022-03-28 DIAGNOSIS — E11.9 TYPE 2 DIABETES MELLITUS WITHOUT COMPLICATION, WITHOUT LONG-TERM CURRENT USE OF INSULIN (HCC): ICD-10-CM

## 2022-03-31 RX ORDER — GLIMEPIRIDE 4 MG/1
TABLET ORAL
Qty: 90 TABLET | Refills: 0 | Status: SHIPPED | OUTPATIENT
Start: 2022-03-31 | End: 2022-06-30 | Stop reason: SDUPTHER

## 2022-05-11 ENCOUNTER — OFFICE VISIT (OUTPATIENT)
Dept: URGENT CARE | Facility: CLINIC | Age: 62
End: 2022-05-11
Payer: COMMERCIAL

## 2022-05-11 VITALS
WEIGHT: 138 LBS | DIASTOLIC BLOOD PRESSURE: 80 MMHG | TEMPERATURE: 98.4 F | RESPIRATION RATE: 14 BRPM | HEART RATE: 84 BPM | OXYGEN SATURATION: 98 % | SYSTOLIC BLOOD PRESSURE: 138 MMHG | HEIGHT: 63 IN | BODY MASS INDEX: 24.45 KG/M2

## 2022-05-11 DIAGNOSIS — G44.209 TENSION HEADACHE: ICD-10-CM

## 2022-05-11 PROCEDURE — 99213 OFFICE O/P EST LOW 20 MIN: CPT | Performed by: PHYSICIAN ASSISTANT

## 2022-05-11 RX ORDER — KETOROLAC TROMETHAMINE 30 MG/ML
30 INJECTION, SOLUTION INTRAMUSCULAR; INTRAVENOUS ONCE
Status: COMPLETED | OUTPATIENT
Start: 2022-05-11 | End: 2022-05-11

## 2022-05-11 RX ORDER — CYCLOBENZAPRINE HCL 5 MG
5-10 TABLET ORAL
Qty: 15 TABLET | Refills: 0 | Status: SHIPPED | OUTPATIENT
Start: 2022-05-11 | End: 2022-06-30 | Stop reason: SDUPTHER

## 2022-05-11 RX ORDER — KETOROLAC TROMETHAMINE 30 MG/ML
30 INJECTION, SOLUTION INTRAMUSCULAR; INTRAVENOUS ONCE
Status: DISCONTINUED | OUTPATIENT
Start: 2022-05-11 | End: 2022-05-11

## 2022-05-11 RX ADMIN — KETOROLAC TROMETHAMINE 30 MG: 30 INJECTION, SOLUTION INTRAMUSCULAR; INTRAVENOUS at 08:44

## 2022-05-11 ASSESSMENT — FIBROSIS 4 INDEX: FIB4 SCORE: 0.63

## 2022-05-11 ASSESSMENT — ENCOUNTER SYMPTOMS
NECK PAIN: 1
MYALGIAS: 0
VOMITING: 0
NAUSEA: 0
ABDOMINAL PAIN: 0
SORE THROAT: 0
CONSTIPATION: 0
SHORTNESS OF BREATH: 0
HEADACHES: 1
CHILLS: 0
FEVER: 0
COUGH: 0
DIARRHEA: 0
EYE PAIN: 0

## 2022-05-11 NOTE — PROGRESS NOTES
"Subjective:   Shona Garcia is a 61 y.o. female who presents for Headache (X 1 day, shooting headache,  RT ear, nasal pain.)      Is a pleasant 61-year-old female who has had increased stress at work this week, works in front of the computer and yesterday evening noticed a slow onset of right-sided neck discomfort, right-sided headache as well as some otalgia.  She not noticed any rash.  She describes the headache as tension or pressure.  She is also had some nasal pain but is been suffering from some seasonal allergies.      Review of Systems   Constitutional: Negative for chills and fever.   HENT: Positive for ear pain. Negative for congestion and sore throat.    Eyes: Negative for pain.   Respiratory: Negative for cough and shortness of breath.    Cardiovascular: Negative for chest pain.   Gastrointestinal: Negative for abdominal pain, constipation, diarrhea, nausea and vomiting.   Genitourinary: Negative for dysuria.   Musculoskeletal: Positive for neck pain. Negative for myalgias.   Skin: Negative for rash.   Neurological: Positive for headaches.       Medications, Allergies, and current problem list reviewed today in Epic.     Objective:     /80   Pulse 84   Temp 36.9 °C (98.4 °F) (Temporal)   Resp 14   Ht 1.6 m (5' 3\")   Wt 62.6 kg (138 lb)   SpO2 98%     Physical Exam  Vitals reviewed.   Constitutional:       Appearance: Normal appearance.   HENT:      Head: Normocephalic and atraumatic.      Comments: No visible rash     Right Ear: Tympanic membrane, ear canal and external ear normal.      Left Ear: Tympanic membrane, ear canal and external ear normal.      Nose: Nose normal. No congestion or rhinorrhea.      Mouth/Throat:      Mouth: Mucous membranes are moist.      Pharynx: Oropharynx is clear. No oropharyngeal exudate or posterior oropharyngeal erythema.   Eyes:      Extraocular Movements: Extraocular movements intact.      Conjunctiva/sclera: Conjunctivae normal.      Pupils: " Pupils are equal, round, and reactive to light.   Neck:      Comments: Significant right-sided trapezial muscle spasm and tenderness along muscle belly including at attachment of occiput and mastoid  Cardiovascular:      Rate and Rhythm: Normal rate.   Pulmonary:      Effort: Pulmonary effort is normal.   Musculoskeletal:      Cervical back: Normal range of motion.   Skin:     General: Skin is warm and dry.      Capillary Refill: Capillary refill takes less than 2 seconds.   Neurological:      General: No focal deficit present.      Mental Status: She is alert and oriented to person, place, and time.      Cranial Nerves: No cranial nerve deficit.      Coordination: Coordination normal.         Assessment/Plan:     Diagnosis and associated orders:     1. Tension headache  cyclobenzaprine (FLEXERIL) 5 mg tablet    ketorolac (TORADOL) injection 30 mg    DISCONTINUED: ketorolac (TORADOL) injection 30 mg      Comments/MDM:     • History and physical consistent with tension headache.  Given the slow onset and features low suspicion for intracranial pathology.  This could represent a neuropathic pain preceding onset of shingles so encouraged her to watch closely for possible rash development.  Given her significant muscle spasm and tenderness the right-sided headache this matches closely with a tension headache.  Discussed Toradol in clinic, Flexeril sent to the pharmacy.  I cautioned her at length about sedating nature of this medication and to use judiciously.  Also recommended regular warm compresses, gentle stretching.         Differential diagnosis, natural history, supportive care, and indications for immediate follow-up discussed.    Advised the patient to follow-up with the primary care physician for recheck, reevaluation, and consideration of further management.    Please note that this dictation was created using voice recognition software. I have made a reasonable attempt to correct obvious errors, but I expect  that there are errors of grammar and possibly content that I did not discover before finalizing the note.    This note was electronically signed by Marcos Anaya PA-C

## 2022-06-28 ENCOUNTER — HOSPITAL ENCOUNTER (OUTPATIENT)
Dept: LAB | Facility: MEDICAL CENTER | Age: 62
End: 2022-06-28
Attending: FAMILY MEDICINE
Payer: COMMERCIAL

## 2022-06-28 DIAGNOSIS — E03.9 ACQUIRED HYPOTHYROIDISM: ICD-10-CM

## 2022-06-28 DIAGNOSIS — E78.1 PURE HYPERTRIGLYCERIDEMIA: ICD-10-CM

## 2022-06-28 DIAGNOSIS — E11.9 TYPE 2 DIABETES MELLITUS WITHOUT COMPLICATION, WITHOUT LONG-TERM CURRENT USE OF INSULIN (HCC): ICD-10-CM

## 2022-06-28 LAB
ALBUMIN SERPL BCP-MCNC: 4.1 G/DL (ref 3.2–4.9)
ALBUMIN/GLOB SERPL: 1.4 G/DL
ALP SERPL-CCNC: 66 U/L (ref 30–99)
ALT SERPL-CCNC: 17 U/L (ref 2–50)
ANION GAP SERPL CALC-SCNC: 11 MMOL/L (ref 7–16)
AST SERPL-CCNC: 15 U/L (ref 12–45)
BASOPHILS # BLD AUTO: 0.6 % (ref 0–1.8)
BASOPHILS # BLD: 0.04 K/UL (ref 0–0.12)
BILIRUB SERPL-MCNC: 0.4 MG/DL (ref 0.1–1.5)
BUN SERPL-MCNC: 9 MG/DL (ref 8–22)
CALCIUM SERPL-MCNC: 9.1 MG/DL (ref 8.4–10.2)
CHLORIDE SERPL-SCNC: 101 MMOL/L (ref 96–112)
CHOLEST SERPL-MCNC: 160 MG/DL (ref 100–199)
CO2 SERPL-SCNC: 23 MMOL/L (ref 20–33)
CREAT SERPL-MCNC: 0.49 MG/DL (ref 0.5–1.4)
CREAT UR-MCNC: 46.3 MG/DL
EOSINOPHIL # BLD AUTO: 0.1 K/UL (ref 0–0.51)
EOSINOPHIL NFR BLD: 1.6 % (ref 0–6.9)
ERYTHROCYTE [DISTWIDTH] IN BLOOD BY AUTOMATED COUNT: 39.6 FL (ref 35.9–50)
EST. AVERAGE GLUCOSE BLD GHB EST-MCNC: 189 MG/DL
FASTING STATUS PATIENT QL REPORTED: NORMAL
GFR SERPLBLD CREATININE-BSD FMLA CKD-EPI: 106 ML/MIN/1.73 M 2
GLOBULIN SER CALC-MCNC: 2.9 G/DL (ref 1.9–3.5)
GLUCOSE SERPL-MCNC: 153 MG/DL (ref 65–99)
HBA1C MFR BLD: 8.2 % (ref 4–5.6)
HCT VFR BLD AUTO: 37.6 % (ref 37–47)
HDLC SERPL-MCNC: 43 MG/DL
HGB BLD-MCNC: 12.6 G/DL (ref 12–16)
IMM GRANULOCYTES # BLD AUTO: 0.01 K/UL (ref 0–0.11)
IMM GRANULOCYTES NFR BLD AUTO: 0.2 % (ref 0–0.9)
LDLC SERPL CALC-MCNC: 76 MG/DL
LYMPHOCYTES # BLD AUTO: 2.62 K/UL (ref 1–4.8)
LYMPHOCYTES NFR BLD: 42.3 % (ref 22–41)
MCH RBC QN AUTO: 28.5 PG (ref 27–33)
MCHC RBC AUTO-ENTMCNC: 33.5 G/DL (ref 33.6–35)
MCV RBC AUTO: 85.1 FL (ref 81.4–97.8)
MICROALBUMIN UR-MCNC: <1.2 MG/DL
MICROALBUMIN/CREAT UR: NORMAL MG/G (ref 0–30)
MONOCYTES # BLD AUTO: 0.42 K/UL (ref 0–0.85)
MONOCYTES NFR BLD AUTO: 6.8 % (ref 0–13.4)
NEUTROPHILS # BLD AUTO: 3.01 K/UL (ref 2–7.15)
NEUTROPHILS NFR BLD: 48.5 % (ref 44–72)
NRBC # BLD AUTO: 0 K/UL
NRBC BLD-RTO: 0 /100 WBC
PLATELET # BLD AUTO: 358 K/UL (ref 164–446)
PMV BLD AUTO: 9 FL (ref 9–12.9)
POTASSIUM SERPL-SCNC: 4 MMOL/L (ref 3.6–5.5)
PROT SERPL-MCNC: 7 G/DL (ref 6–8.2)
RBC # BLD AUTO: 4.42 M/UL (ref 4.2–5.4)
SODIUM SERPL-SCNC: 135 MMOL/L (ref 135–145)
T4 FREE SERPL-MCNC: 1.7 NG/DL (ref 0.93–1.7)
TRIGL SERPL-MCNC: 207 MG/DL (ref 0–149)
TSH SERPL DL<=0.005 MIU/L-ACNC: 1.3 UIU/ML (ref 0.38–5.33)
WBC # BLD AUTO: 6.2 K/UL (ref 4.8–10.8)

## 2022-06-28 PROCEDURE — 80053 COMPREHEN METABOLIC PANEL: CPT

## 2022-06-28 PROCEDURE — 84439 ASSAY OF FREE THYROXINE: CPT

## 2022-06-28 PROCEDURE — 36415 COLL VENOUS BLD VENIPUNCTURE: CPT

## 2022-06-28 PROCEDURE — 85025 COMPLETE CBC W/AUTO DIFF WBC: CPT

## 2022-06-28 PROCEDURE — 84443 ASSAY THYROID STIM HORMONE: CPT

## 2022-06-28 PROCEDURE — 82043 UR ALBUMIN QUANTITATIVE: CPT

## 2022-06-28 PROCEDURE — 80061 LIPID PANEL: CPT

## 2022-06-28 PROCEDURE — 82570 ASSAY OF URINE CREATININE: CPT

## 2022-06-28 PROCEDURE — 83036 HEMOGLOBIN GLYCOSYLATED A1C: CPT

## 2022-06-30 ENCOUNTER — OFFICE VISIT (OUTPATIENT)
Dept: MEDICAL GROUP | Age: 62
End: 2022-06-30
Payer: COMMERCIAL

## 2022-06-30 VITALS
HEIGHT: 63 IN | DIASTOLIC BLOOD PRESSURE: 80 MMHG | TEMPERATURE: 96.6 F | BODY MASS INDEX: 24.45 KG/M2 | SYSTOLIC BLOOD PRESSURE: 128 MMHG | OXYGEN SATURATION: 98 % | WEIGHT: 138 LBS | HEART RATE: 82 BPM

## 2022-06-30 DIAGNOSIS — F45.8 BRUXISM (TEETH GRINDING): ICD-10-CM

## 2022-06-30 DIAGNOSIS — E78.1 PURE HYPERTRIGLYCERIDEMIA: ICD-10-CM

## 2022-06-30 DIAGNOSIS — R03.0 ELEVATED BLOOD PRESSURE READING: ICD-10-CM

## 2022-06-30 DIAGNOSIS — G44.209 TENSION HEADACHE: ICD-10-CM

## 2022-06-30 DIAGNOSIS — E03.9 ACQUIRED HYPOTHYROIDISM: ICD-10-CM

## 2022-06-30 DIAGNOSIS — M26.629 TMJ SYNDROME: ICD-10-CM

## 2022-06-30 DIAGNOSIS — E11.9 TYPE 2 DIABETES MELLITUS WITHOUT COMPLICATION, WITHOUT LONG-TERM CURRENT USE OF INSULIN (HCC): ICD-10-CM

## 2022-06-30 PROCEDURE — 99214 OFFICE O/P EST MOD 30 MIN: CPT | Performed by: FAMILY MEDICINE

## 2022-06-30 RX ORDER — CYCLOBENZAPRINE HCL 5 MG
5 TABLET ORAL NIGHTLY
Qty: 90 TABLET | Refills: 1 | Status: SHIPPED | OUTPATIENT
Start: 2022-06-30 | End: 2022-12-29

## 2022-06-30 RX ORDER — LANCETS 33 GAUGE
EACH MISCELLANEOUS
Qty: 100 EACH | Refills: 2 | Status: SHIPPED | OUTPATIENT
Start: 2022-06-30 | End: 2023-02-10

## 2022-06-30 RX ORDER — LEVOTHYROXINE SODIUM 0.07 MG/1
75 TABLET ORAL
Qty: 90 TABLET | Refills: 3 | Status: SHIPPED | OUTPATIENT
Start: 2022-06-30 | End: 2023-03-30 | Stop reason: SDUPTHER

## 2022-06-30 RX ORDER — BLOOD SUGAR DIAGNOSTIC
STRIP MISCELLANEOUS
Qty: 100 STRIP | Refills: 5 | Status: SHIPPED | OUTPATIENT
Start: 2022-06-30 | End: 2022-12-29 | Stop reason: SDUPTHER

## 2022-06-30 RX ORDER — GLIMEPIRIDE 4 MG/1
4 TABLET ORAL EVERY MORNING
Qty: 90 TABLET | Refills: 1 | Status: SHIPPED | OUTPATIENT
Start: 2022-06-30 | End: 2022-12-05 | Stop reason: SDUPTHER

## 2022-06-30 ASSESSMENT — FIBROSIS 4 INDEX: FIB4 SCORE: 0.63

## 2022-07-01 NOTE — PROGRESS NOTES
Subjective:   CC: Diabetes follow-up    HPI:     Shona Garcia is a 61 y.o. female, established patient of the clinic.     Patient has chronic poorly controlled type 2 diabetes.  She is currently taking glimepiride 4 mg daily.  Patient states that she is compliant to pharmacotherapy.  However, patient has not been adhering to diabetic diet.  She is active, but does not exercise regularly.  Her weight remains stable.  She has BMI of 24.45.  Negative visual changes, concerning lesion on her feet, symptoms of polyneuropathy, hypoglycemia.     She was previously found to have elevated blood pressure.  Home blood pressure has been within normal limits according to patient.  Her blood pressure was 128/80 today.    She also have history of hypertriglyceridemia that is diet controlled.    Patient complains of intermittent right temporal headaches.  Patient was seen by urgent care for this couple weeks ago.  She was treated with NSAIDs and Flexeril which is helpful.  She denies any neurological symptoms history of head trauma, history of MVA.    Current medicines (including changes today)  Current Outpatient Medications   Medication Sig Dispense Refill   • glucose blood (ONETOUCH VERIO) strip USE  STRIP TO CHECK GLUCOSE ONCE DAILY OR  AS  NEEDED  FOR  SYMPTOMS  OF  LOW/HIGH  BLOOD  SUGAR 100 Strip 5   • Lancets (ONETOUCH DELICA PLUS [id number]) Misc Check fasting blood sugar once daily or as needed for symptoms of high/low blood sugar 100 Each 2   • SITagliptin (JANUVIA) 25 MG Tab Take 1 Tablet by mouth every day. 90 Tablet 1   • glimepiride (AMARYL) 4 MG Tab Take 1 Tablet by mouth every morning. 90 Tablet 1   • levothyroxine (SYNTHROID) 75 MCG Tab Take 1 Tablet by mouth every morning on an empty stomach. 90 Tablet 3   • cyclobenzaprine (FLEXERIL) 5 mg tablet Take 1 Tablet by mouth every evening. 90 Tablet 1   • azelastine (ASTELIN) 137 MCG/SPRAY nasal spray Administer 2 Sprays into affected nostril(S) 2 times  "a day. 30 mL 5   • Diclofenac Sodium 1 % Gel Apply to 2-4 gram to affected area 4 times daily 100 g 1   • triamcinolone acetonide (KENALOG) 0.1 % Cream Apply to affected area twice daily 1 Tube 2   • Omega-3 Fatty Acids (OMEGA 3 PO) Take  by mouth.     • vitamin D (CHOLECALCIFEROL) 1000 UNIT TABS Take 2,000 Units by mouth every day.       No current facility-administered medications for this visit.     She  has a past medical history of Allergy, Anemia, Blood transfusion (1986), Diabetes mellitus type II (4/18/2011), Dyslipidemia (8/28/2013), GERD (gastroesophageal reflux disease), Headache(784.0), Heart murmur, Measles, Pyelonephritis (2002), S/P colonoscopy (2010), Thyroid disease, Uterine fibroid (1986), Uterine fibroid, and Varicella.    She has no past medical history of Addisons disease ([facility]), Adrenal disorder ([facility]), Anxiety, Arrhythmia, ASTHMA, CATARACT, CHF (congestive heart failure) ([facility]), Clotting disorder ([facility]), COPD, Cushings syndrome ([facility]), Depression, EMPHYSEMA, Glaucoma, Goiter, Heart attack ([facility]), Hypertension, IBD (inflammatory bowel disease), Meningitis, Migraine, Muscle disorder, OSTEOPOROSIS, Parathyroid disorder ([facility]), Pituitary disease ([facility]), Seizure ([facility]), Stroke ([facility]), Substance abuse ([facility]), Tuberculosis, Ulcer, or Urinary tract infection, site not specified.    I reviewed patient's problem list, allergies, medications, family hx, social hx with patient and update EPIC.        Objective:     /80 (BP Location: Right arm, Patient Position: Sitting, BP Cuff Size: Adult)   Pulse 82   Temp 35.9 °C (96.6 °F) (Temporal)   Ht 1.6 m (5' 3\")   Wt 62.6 kg (138 lb)   SpO2 98%  Body mass index is 24.45 kg/m².    Physical Exam:  Constitutional: awake, alert, in no distress.  Skin: Warm, dry, good turgor, no rashes, bruises, ulcers in visible areas.  Eye: conjunctiva clear, lids neg for edema or lesions.  Neck: Trachea midline, no masses, no thyromegaly. No cervical or supraclavicular " lymphadenopathy  -Soft tissue tenderness to palpation around the right TMJ.  -Right upper molars are flattened  -Muscle tension at the temporal and right lateral neck  Respiratory: Unlabored respiratory effort, lungs clear to auscultation, no wheezes, no rales.  Cardiovascular: Normal S1, S2, no murmur, no pedal edema.  Abdomen: Soft, non-tender to palpation, active BS, no hernia, no hepatosplenomegaly, negative rebound or guarding.   Neuro: CN2-12 grossly intact. Strength 5/5, reflexes 2/4 in all extremities, no sensory deficits.  Psych: Oriented x3, affect and mood wnl, intact judgement and insight.       Assessment and Plan:   The following treatment plan was discussed    1. Type 2 diabetes mellitus without complication, without long-term current use of insulin (HCC)  Chronic, poorly controlled, patient currently taking glimepiride 4 mg daily.  She is compliant to pharmacotherapy.  However, patient has not been adhering to diabetic diet.  She is active, but does not exercise regularly.  She is not able to tolerate metformin.  Her A1C was 8.2 on June 28, 2022.  - glucose blood (ONETOUCH VERIO) strip; USE  STRIP TO CHECK GLUCOSE ONCE DAILY OR  AS  NEEDED  FOR  SYMPTOMS  OF  LOW/HIGH  BLOOD  SUGAR  Dispense: 100 Strip; Refill: 5  - Lancets (ONETOUCH DELICA PLUS [id number]) Misc; Check fasting blood sugar once daily or as needed for symptoms of high/low blood sugar  Dispense: 100 Each; Refill: 2  -At SITagliptin (JANUVIA) 25 MG Tab; Take 1 Tablet by mouth every day.  Dispense: 90 Tablet; Refill: 1  -Continue glimepiride (AMARYL) 4 MG Tab; Take 1 Tablet by mouth every morning.  Dispense: 90 Tablet; Refill: 1  - HEMOGLOBIN A1C; Future  - dietary modification, exercise, weight loss  - Annual eye exam: Scheduled or in August 2022  - Regular foot exam  - Discussed prevention and management of hypoglycemia  - Side effects of all medications discussed with patient  - Follow up in 3 months.     2. Elevated blood pressure  reading  Resolved     3. Pure hypertriglyceridemia  Mild, does not require treatment.  Total cholesterol, LDL, HDL are normal      4. Acquired hypothyroidism  Chronic, controlled with levothyroxine 75 mcg daily, no s/e reported, will continue.    - levothyroxine (SYNTHROID) 75 MCG Tab; Take 1 Tablet by mouth every morning on an empty stomach.  Dispense: 90 Tablet; Refill: 3    5. 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 Tension headache  History and exams are concerning for bruxism TMJ syndrome leading to intermittent tension headaches.  - cyclobenzaprine (FLEXERIL) 5 mg tablet; Take 1 Tablet by mouth every evening.  Dispense: 90 Tablet; Refill: 1  - Mouth guard.   - ibuprofen PRN for pain  - avoid chewing gum, excessive consumption of chewy foods.   - avoid resting chin on hands  - avoid biting hard objects such as fingernails, pencils, ice, etc.   - eats slowly, small bites, chews on both side of the mouth.         Jasmin Hall M.D.      Followup: Return in about 3 months (around 9/30/2022) for Diabetes.    Please note that this dictation was created using voice recognition software. I have made every reasonable attempt to correct obvious errors, but I expect that there are errors of grammar and possibly content that I did not discover before finalizing the note.

## 2022-07-18 ENCOUNTER — TELEPHONE (OUTPATIENT)
Dept: MEDICAL GROUP | Age: 62
End: 2022-07-18
Payer: COMMERCIAL

## 2022-07-18 DIAGNOSIS — E11.9 TYPE 2 DIABETES MELLITUS WITHOUT COMPLICATION, WITHOUT LONG-TERM CURRENT USE OF INSULIN (HCC): ICD-10-CM

## 2022-07-18 NOTE — TELEPHONE ENCOUNTER
DOCUMENTATION OF PAR STATUS:    1. Name of Medication & Dose: Januvia 25mg tablets     2. Name of Prescription Coverage Company & phone #: [employer] [phone]    3. Date Prior Auth Submitted: 7/18/22    4. What information was given to obtain insurance decision? ICD10, chart notes, A1C    5. Prior Auth Status? Pending    6. Patient Notified: no

## 2022-08-04 PROBLEM — Z91.148 NONCOMPLIANCE WITH MEDICATIONS: Status: ACTIVE | Noted: 2022-08-04

## 2022-08-04 NOTE — TELEPHONE ENCOUNTER
FINAL PRIOR AUTHORIZATION STATUS:    1.  Name of Medication & Dose: Januvia 25mg tablets     2. Prior Auth Status: Denied.  Reason: unknown    3. Action Taken: Pharmacy Notified: no Patient Notified: no

## 2022-08-15 ENCOUNTER — HOSPITAL ENCOUNTER (OUTPATIENT)
Dept: RADIOLOGY | Facility: MEDICAL CENTER | Age: 62
End: 2022-08-15
Attending: FAMILY MEDICINE
Payer: COMMERCIAL

## 2022-08-15 DIAGNOSIS — Z12.31 VISIT FOR SCREENING MAMMOGRAM: ICD-10-CM

## 2022-08-15 PROCEDURE — 77063 BREAST TOMOSYNTHESIS BI: CPT

## 2022-09-23 ENCOUNTER — OFFICE VISIT (OUTPATIENT)
Dept: MEDICAL GROUP | Age: 62
End: 2022-09-23
Payer: COMMERCIAL

## 2022-09-23 VITALS
SYSTOLIC BLOOD PRESSURE: 122 MMHG | OXYGEN SATURATION: 99 % | HEART RATE: 77 BPM | DIASTOLIC BLOOD PRESSURE: 76 MMHG | BODY MASS INDEX: 25.16 KG/M2 | HEIGHT: 63 IN | WEIGHT: 142 LBS | TEMPERATURE: 96.7 F

## 2022-09-23 DIAGNOSIS — E11.9 TYPE 2 DIABETES MELLITUS WITHOUT COMPLICATION, WITHOUT LONG-TERM CURRENT USE OF INSULIN (HCC): ICD-10-CM

## 2022-09-23 DIAGNOSIS — Z23 NEED FOR VACCINATION: ICD-10-CM

## 2022-09-23 DIAGNOSIS — M19.032 LOCALIZED PRIMARY OSTEOARTHRITIS OF CARPOMETACARPAL (CMC) JOINT OF LEFT WRIST: ICD-10-CM

## 2022-09-23 DIAGNOSIS — Z53.20 STATIN DECLINED: ICD-10-CM

## 2022-09-23 DIAGNOSIS — E78.1 PURE HYPERTRIGLYCERIDEMIA: ICD-10-CM

## 2022-09-23 DIAGNOSIS — E03.9 ACQUIRED HYPOTHYROIDISM: ICD-10-CM

## 2022-09-23 LAB
HBA1C MFR BLD: 8.1 % (ref 0–5.6)
INT CON NEG: NEGATIVE
INT CON POS: POSITIVE

## 2022-09-23 PROCEDURE — 90686 IIV4 VACC NO PRSV 0.5 ML IM: CPT | Performed by: FAMILY MEDICINE

## 2022-09-23 PROCEDURE — 90471 IMMUNIZATION ADMIN: CPT | Performed by: FAMILY MEDICINE

## 2022-09-23 PROCEDURE — 83036 HEMOGLOBIN GLYCOSYLATED A1C: CPT | Performed by: FAMILY MEDICINE

## 2022-09-23 PROCEDURE — 99214 OFFICE O/P EST MOD 30 MIN: CPT | Mod: 25 | Performed by: FAMILY MEDICINE

## 2022-09-23 ASSESSMENT — FIBROSIS 4 INDEX: FIB4 SCORE: 0.64

## 2022-09-26 PROBLEM — Z53.20 STATIN DECLINED: Status: ACTIVE | Noted: 2022-09-26

## 2022-09-26 PROBLEM — M65.312 TRIGGER FINGER OF LEFT THUMB: Status: RESOLVED | Noted: 2022-03-24 | Resolved: 2022-09-26

## 2022-09-26 NOTE — PROGRESS NOTES
Subjective:   CC: diabetes follow up     HPI:     Shona Garcia is a 62 y.o. female, established patient of the clinic.     Patient has chronic poorly controlled type 2 diabetes, likely secondary to noncompliance.  Patient was prescribed glimepiride 4 mg daily and empagliflozin 10 mg daily for this condition at previous office visit.  Unfortunately, patient did not  empagliflozin due to some issues with either insurance or pharmacy.  However, she states that she has been compliant with glimepiride 4 mg daily.  She tries her best to adhere to diabetic diet.  She is active, but does not exercise regularly.  Her A1c improved from 8.2 at previous office visit to 8.1 today.  Negative visual changes, symptoms of polyneuropathy, concerning lesion on her feet.    Patient also has chronic hypertriglyceridemia with normal cholesterol profile and hypothyroidism.  She declined statin despite known history of type 2 diabetes due to concerns of side effects.  She is compliant to levothyroxine 75 mcg daily.  She is overall doing well.     She complains of ongoing pain at the left first CMC for a number of months.  Conservative management failed to control symptoms.    Current medicines (including changes today)  Current Outpatient Medications   Medication Sig Dispense Refill    Empagliflozin 10 MG Tab Take 1 Tablet by mouth every day. Prior to first meal of the day 90 Tablet 1    glucose blood (ONETOUCH VERIO) strip USE  STRIP TO CHECK GLUCOSE ONCE DAILY OR  AS  NEEDED  FOR  SYMPTOMS  OF  LOW/HIGH  BLOOD  SUGAR 100 Strip 5    Lancets (ONETOUCH DELICA PLUS [id number]) Misc Check fasting blood sugar once daily or as needed for symptoms of high/low blood sugar 100 Each 2    glimepiride (AMARYL) 4 MG Tab Take 1 Tablet by mouth every morning. 90 Tablet 1    levothyroxine (SYNTHROID) 75 MCG Tab Take 1 Tablet by mouth every morning on an empty stomach. 90 Tablet 3    cyclobenzaprine (FLEXERIL) 5 mg tablet Take 1  "Tablet by mouth every evening. 90 Tablet 1    azelastine (ASTELIN) 137 MCG/SPRAY nasal spray Administer 2 Sprays into affected nostril(S) 2 times a day. 30 mL 5    Diclofenac Sodium 1 % Gel Apply to 2-4 gram to affected area 4 times daily 100 g 1    triamcinolone acetonide (KENALOG) 0.1 % Cream Apply to affected area twice daily 1 Tube 2    Omega-3 Fatty Acids (OMEGA 3 PO) Take  by mouth.      vitamin D (CHOLECALCIFEROL) 1000 UNIT TABS Take 2,000 Units by mouth every day.       No current facility-administered medications for this visit.     She  has a past medical history of Allergy, Anemia, Blood transfusion (1986), Diabetes mellitus type II (4/18/2011), Dyslipidemia (8/28/2013), GERD (gastroesophageal reflux disease), Headache(784.0), Heart murmur, Measles, Pyelonephritis (2002), S/P colonoscopy (2010), Thyroid disease, Uterine fibroid (1986), Uterine fibroid, and Varicella.    She has no past medical history of Addisons disease ([facility]), Adrenal disorder ([facility]), Anxiety, Arrhythmia, ASTHMA, CATARACT, CHF (congestive heart failure) ([facility]), Clotting disorder (HCC), COPD, Cushings syndrome ([facility]), Depression, EMPHYSEMA, Glaucoma, Goiter, Heart attack (HCC), Hypertension, IBD (inflammatory bowel disease), Meningitis, Migraine, Muscle disorder, OSTEOPOROSIS, Parathyroid disorder ([facility]), Pituitary disease ([facility]), Seizure ([facility]), Stroke ([facility]), Substance abuse ([facility]), Tuberculosis, Ulcer, or Urinary tract infection, site not specified.    I reviewed patient's problem list, allergies, medications, family hx, social hx with patient and update EPIC.        Objective:     /76 (BP Location: Right arm, Patient Position: Sitting, BP Cuff Size: Small adult)   Pulse 77   Temp 35.9 °C (96.7 °F) (Temporal)   Ht 1.6 m (5' 3\")   Wt 64.4 kg (142 lb)   SpO2 99%  Body mass index is 25.15 kg/m².    Physical Exam:  Constitutional: awake, alert, in no distress.  Skin: Warm, dry, good turgor, no rashes, bruises, ulcers in visible " "areas.  Eye: conjunctiva clear, lids neg for edema or lesions.  Neck: Trachea midline, no masses, no thyromegaly. No cervical or supraclavicular lymphadenopathy  Respiratory: Unlabored respiratory effort, lungs clear to auscultation, no wheezes, no rales.  Cardiovascular: Normal S1, S2, no murmur, no pedal edema.  Psych: Oriented x3, affect and mood wnl, intact judgement and insight.       Assessment and Plan:   The following treatment plan was discussed    1. Type 2 diabetes mellitus without complication, without long-term current use of insulin (HCC)  Chronic, poorly controlled secondary to compliance problems (according to insurance report). She was prescribed Glimepiride 4 mg and Empaglifozin 10 mg qd at previous office visit, but she was not able to  Empagliflozin due to either \"insurance\" or \"pharmacy\" problems. She states that she has been taking Glimepiride 4 mg daily as directed. Her A1C changes from 8.2 to 8.1.   - POCT Hemoglobin A1C: 8.1.   - Empagliflozin 10 MG Tab; Take 1 Tablet by mouth every day. Prior to first meal of the day  Dispense: 90 Tablet; Refill: 1. Patient was advised to contact the clinic if she has trouble picking up her medications   - I had long conversation with patient regarding compliance.    - continue Glimepiride 4 mg qd  - dietary modification, exercise, weight loss  - Annual eye exam  - Regular foot exam  - Discussed prevention and management of hypoglycemia  - Side effects of all medications discussed with patient  - Follow up in 3 months.     2. Pure hypertriglyceridemia  3. Statin declined.   Chronic, declined statin despite known type 2 diabetes   - dietary modification, exercise, and weight loss.   - avoid alcohol, drugs, tobacco products     4. Acquired hypothyroidism  Chronic, controlled with Levothyroxine 75 mcg qd, no s/e reported, will continue.      5. Localized primary osteoarthritis of carpometacarpal (CMC) joint of left wrist  Chronic, failed conservative " treatment.   - Referral to Hand Surgery    6. Need for vaccination  - INFLUENZA VACCINE QUAD INJ (PF)      Jasmin Hall M.D.      Followup: Return in about 3 months (around 12/23/2022) for Diabetes.    Please note that this dictation was created using voice recognition software. I have made every reasonable attempt to correct obvious errors, but I expect that there are errors of grammar and possibly content that I did not discover before finalizing the note.

## 2022-11-14 ENCOUNTER — PATIENT MESSAGE (OUTPATIENT)
Dept: MEDICAL GROUP | Age: 62
End: 2022-11-14
Payer: COMMERCIAL

## 2022-11-15 ENCOUNTER — TELEMEDICINE (OUTPATIENT)
Dept: MEDICAL GROUP | Age: 62
End: 2022-11-15
Payer: COMMERCIAL

## 2022-11-15 ENCOUNTER — TELEPHONE (OUTPATIENT)
Dept: MEDICAL GROUP | Age: 62
End: 2022-11-15
Payer: COMMERCIAL

## 2022-11-15 VITALS — WEIGHT: 135 LBS | BODY MASS INDEX: 23.92 KG/M2 | HEIGHT: 63 IN | RESPIRATION RATE: 16 BRPM

## 2022-11-15 DIAGNOSIS — U07.1 ACUTE COVID-19: ICD-10-CM

## 2022-11-15 PROCEDURE — 99214 OFFICE O/P EST MOD 30 MIN: CPT | Mod: 95 | Performed by: FAMILY MEDICINE

## 2022-11-15 ASSESSMENT — FIBROSIS 4 INDEX: FIB4 SCORE: 0.64

## 2022-11-15 NOTE — PROGRESS NOTES
Virtual Visit: Established Patient   This visit was conducted via  [employer]  using secure and encrypted videoconferencing technology.   The patient was in their home in the state of [place].    The patient's identity was confirmed and verbal consent was obtained for this virtual visit.     Subjective:   CC: Acute Covid 19 infection.     Patient has chronic poorly controlled type 2 diabetes, hyperlipidemia, hypothyroidism. Patient develops acute headache, facial pain, rhinorrhea, tiredness about 3 days ago. She was sent home from work. She later tested positive for Covid. Negative fever, SOB, GALLOWAY, history of chronic lung diseases, history of smoking or 2nd-hand smoke exposure. She has not had the latest Covid booster.     Current medicines (including changes today)  Current Outpatient Medications   Medication Sig Dispense Refill    Nirmatrelvir&Ritonavir 300/100 20 x 150 MG & 10 x 100MG Tablet Therapy Pack Take 300 mg nirmatrelvir (two 150 mg tablets) with 100 mg ritonavir (one 100 mg tablet) by mouth, with all three tablets taken together twice daily for 5 days. 30 Each 0    Empagliflozin 10 MG Tab Take 1 Tablet by mouth every day. Prior to first meal of the day 90 Tablet 1    glucose blood (ONETOUCH VERIO) strip USE  STRIP TO CHECK GLUCOSE ONCE DAILY OR  AS  NEEDED  FOR  SYMPTOMS  OF  LOW/HIGH  BLOOD  SUGAR 100 Strip 5    Lancets (ONETOUCH DELICA PLUS [id number]) Misc Check fasting blood sugar once daily or as needed for symptoms of high/low blood sugar 100 Each 2    glimepiride (AMARYL) 4 MG Tab Take 1 Tablet by mouth every morning. 90 Tablet 1    levothyroxine (SYNTHROID) 75 MCG Tab Take 1 Tablet by mouth every morning on an empty stomach. 90 Tablet 3    cyclobenzaprine (FLEXERIL) 5 mg tablet Take 1 Tablet by mouth every evening. 90 Tablet 1    azelastine (ASTELIN) 137 MCG/SPRAY nasal spray Administer 2 Sprays into affected nostril(S) 2 times a day. 30 mL 5    Diclofenac Sodium 1 % Gel Apply to 2-4 gram to affected  "area 4 times daily 100 g 1    triamcinolone acetonide (KENALOG) 0.1 % Cream Apply to affected area twice daily 1 Tube 2    Omega-3 Fatty Acids (OMEGA 3 PO) Take  by mouth.      vitamin D (CHOLECALCIFEROL) 1000 UNIT TABS Take 2 Tablets by mouth every day.       No current facility-administered medications for this visit.       Patient Active Problem List    Diagnosis Date Noted    Statin declined 09/26/2022    Noncompliance with medications 08/04/2022    Bruxism (teeth grinding) 06/30/2022    TMJ syndrome 06/30/2022    Heart murmur 09/23/2021    Chronic pain of both knees 02/04/2020    Rosacea 07/19/2018    Type 2 diabetes mellitus without complication, without long-term current use of insulin ([facility]) 07/14/2017    Pure hypertriglyceridemia 08/28/2013    Fibroid uterus_Dr. Clancy 09/14/2010    Hypothyroidism 09/14/2010        Objective:   Resp 16   Ht 1.6 m (5' 3\")   Wt 61.2 kg (135 lb)   LMP 08/20/2011   BMI 23.91 kg/m²     Physical Exam:  Constitutional: Alert, no distress, well-groomed.  Skin: No rashes in visible areas.  Eye: Round. Conjunctiva clear, lids normal. No icterus.   ENMT: Lips pink without lesions, good dentition, moist mucous membranes. Phonation normal.  Neck: No masses, no thyromegaly. Moves freely without pain.  Respiratory: Unlabored respiratory effort, no cough or audible wheeze  Psych: Alert and oriented x3, normal affect and mood.     Assessment and Plan:   The following treatment plan was discussed:     1. Acute COVID-19  - Nirmatrelvir&Ritonavir 300/100 20 x 150 MG & 10 x 100MG Tablet Therapy Pack; Take 300 mg nirmatrelvir (two 150 mg tablets) with 100 mg ritonavir (one 100 mg tablet) by mouth, with all three tablets taken together twice daily for 5 days.  Dispense: 30 Each; Refill: 0  - nasal saline rinse   - follow CDC guideline for isolation and de-isolation   - conservative treatment recommended and discussed.   - indications for emergent care discussed.         Follow-up: Return " for As needed.

## 2022-11-15 NOTE — PATIENT COMMUNICATION
Phone Number Called: [phone] (home) [phone] (work)      Call outcome: Spoke to patient regarding message below.    Message: pt has been scheduled for VV at 0340. Pt doesn't have zoom and has been instructed on use of Doximity.

## 2022-12-05 DIAGNOSIS — E11.9 TYPE 2 DIABETES MELLITUS WITHOUT COMPLICATION, WITHOUT LONG-TERM CURRENT USE OF INSULIN (HCC): ICD-10-CM

## 2022-12-05 RX ORDER — GLIMEPIRIDE 4 MG/1
4 TABLET ORAL EVERY MORNING
Qty: 90 TABLET | Refills: 1 | Status: SHIPPED | OUTPATIENT
Start: 2022-12-05 | End: 2023-03-30 | Stop reason: SDUPTHER

## 2022-12-27 ENCOUNTER — OFFICE VISIT (OUTPATIENT)
Dept: URGENT CARE | Facility: CLINIC | Age: 62
End: 2022-12-27
Payer: COMMERCIAL

## 2022-12-27 ENCOUNTER — HOSPITAL ENCOUNTER (OUTPATIENT)
Dept: LAB | Facility: MEDICAL CENTER | Age: 62
End: 2022-12-27
Attending: FAMILY MEDICINE
Payer: COMMERCIAL

## 2022-12-27 VITALS
TEMPERATURE: 97.1 F | SYSTOLIC BLOOD PRESSURE: 144 MMHG | WEIGHT: 135 LBS | RESPIRATION RATE: 20 BRPM | DIASTOLIC BLOOD PRESSURE: 82 MMHG | OXYGEN SATURATION: 97 % | HEIGHT: 63 IN | BODY MASS INDEX: 23.92 KG/M2 | HEART RATE: 78 BPM

## 2022-12-27 DIAGNOSIS — N30.01 ACUTE CYSTITIS WITH HEMATURIA: ICD-10-CM

## 2022-12-27 DIAGNOSIS — R39.9 UTI SYMPTOMS: ICD-10-CM

## 2022-12-27 DIAGNOSIS — E11.9 TYPE 2 DIABETES MELLITUS WITHOUT COMPLICATION, WITHOUT LONG-TERM CURRENT USE OF INSULIN (HCC): ICD-10-CM

## 2022-12-27 DIAGNOSIS — R03.0 ELEVATED BLOOD PRESSURE READING: ICD-10-CM

## 2022-12-27 LAB
APPEARANCE UR: CLEAR
BILIRUB UR STRIP-MCNC: NORMAL MG/DL
COLOR UR AUTO: NORMAL
EST. AVERAGE GLUCOSE BLD GHB EST-MCNC: 169 MG/DL
GLUCOSE UR STRIP.AUTO-MCNC: 500 MG/DL
HBA1C MFR BLD: 7.5 % (ref 4–5.6)
KETONES UR STRIP.AUTO-MCNC: NORMAL MG/DL
LEUKOCYTE ESTERASE UR QL STRIP.AUTO: NORMAL
NITRITE UR QL STRIP.AUTO: NORMAL
PH UR STRIP.AUTO: 5.5 [PH] (ref 5–8)
PROT UR QL STRIP: NORMAL MG/DL
RBC UR QL AUTO: NORMAL
SP GR UR STRIP.AUTO: 1
UROBILINOGEN UR STRIP-MCNC: 0.2 MG/DL

## 2022-12-27 PROCEDURE — 81002 URINALYSIS NONAUTO W/O SCOPE: CPT | Performed by: FAMILY MEDICINE

## 2022-12-27 PROCEDURE — 83036 HEMOGLOBIN GLYCOSYLATED A1C: CPT

## 2022-12-27 PROCEDURE — 36415 COLL VENOUS BLD VENIPUNCTURE: CPT

## 2022-12-27 PROCEDURE — 99214 OFFICE O/P EST MOD 30 MIN: CPT | Performed by: FAMILY MEDICINE

## 2022-12-27 RX ORDER — PHENAZOPYRIDINE HYDROCHLORIDE 200 MG/1
200 TABLET, FILM COATED ORAL 3 TIMES DAILY PRN
Qty: 6 TABLET | Refills: 0 | Status: SHIPPED | OUTPATIENT
Start: 2022-12-27 | End: 2023-02-10

## 2022-12-27 RX ORDER — NITROFURANTOIN 25; 75 MG/1; MG/1
100 CAPSULE ORAL EVERY 12 HOURS
Qty: 10 CAPSULE | Refills: 0 | Status: SHIPPED | OUTPATIENT
Start: 2022-12-27 | End: 2023-01-01

## 2022-12-27 ASSESSMENT — FIBROSIS 4 INDEX: FIB4 SCORE: 0.64

## 2022-12-27 NOTE — PROGRESS NOTES
"  Subjective:      62 y.o. female presents to urgent care for concerns of a bladder infection.  Since this morning she has been experiencing increased urinary urgency, frequency, dysuria, and hematuria.  Bowel movements are regular and soft.  She drinks an average of 2L of water and 3 caffeinated beverages.  She is not currently sexually active.     Blood pressure is elevated today in urgent care.  She does not have a history of hypertension and does not take any medication for this.  She denies any chest pain, palpitations, shortness of breath.    She denies any other questions or concerns at this time.    Current problem list, medication, and past medical/surgical history were reviewed in Epic.    ROS  See HPI     Objective:      BP (!) 144/82   Pulse 78   Temp 36.2 °C (97.1 °F) (Temporal)   Resp 20   Ht 1.6 m (5' 3\")   Wt 61.2 kg (135 lb)   LMP 08/20/2011   SpO2 97%   Breastfeeding No   BMI 23.91 kg/m²     Physical Exam  Constitutional:       General: She is not in acute distress.     Appearance: She is not diaphoretic.   Cardiovascular:      Rate and Rhythm: Normal rate and regular rhythm.      Heart sounds: Normal heart sounds.   Pulmonary:      Effort: Pulmonary effort is normal. No respiratory distress.      Breath sounds: Normal breath sounds.   Abdominal:      General: Bowel sounds are normal.      Palpations: Abdomen is soft.      Tenderness: There is no right CVA tenderness or left CVA tenderness.   Neurological:      Mental Status: She is alert.   Psychiatric:         Mood and Affect: Affect normal.         Judgment: Judgment normal.     Assessment/Plan:     1. Acute cystitis with hematuria  2. UTI symptoms  Urinalysis indicates infection.  Prescription for Macrobid and Pyridium have been sent.  - POCT Urinalysis  - nitrofurantoin (MACROBID) 100 MG Cap; Take 1 Capsule by mouth every 12 hours for 5 days.  Dispense: 10 Capsule; Refill: 0  - phenazopyridine (PYRIDIUM) 200 MG Tab; Take 1 Tablet by " mouth 3 times a day as needed for Moderate Pain or Mild Pain.  Dispense: 6 Tablet; Refill: 0    3. Elevated blood pressure reading  Systemic symptoms seen through elevated blood pressure.  This is asymptomatic.  Follow with PCP.      Instructed to return to Urgent Care or nearest Emergency Department if symptoms fail to improve, for any change in condition, further concerns, or new concerning symptoms. Patient states understanding of the plan of care and discharge instructions.    Andree Bo M.D.

## 2022-12-27 NOTE — LETTER
December 27, 2022    To Whom It May Concern:         This is confirmation that Madisonemery Wood Radha attended her scheduled appointment with Andree Bo M.D. on 12/27/22.  She may return to work on Thursday without any restrictions.         If you have any questions please do not hesitate to call me at the phone number listed below.    Sincerely,          Andree Bo M.D.  [phone]

## 2022-12-29 ENCOUNTER — OFFICE VISIT (OUTPATIENT)
Dept: MEDICAL GROUP | Age: 62
End: 2022-12-29
Payer: COMMERCIAL

## 2022-12-29 VITALS
OXYGEN SATURATION: 97 % | TEMPERATURE: 96.9 F | SYSTOLIC BLOOD PRESSURE: 124 MMHG | WEIGHT: 136 LBS | HEART RATE: 81 BPM | HEIGHT: 63 IN | BODY MASS INDEX: 24.1 KG/M2 | DIASTOLIC BLOOD PRESSURE: 70 MMHG

## 2022-12-29 DIAGNOSIS — M25.562 CHRONIC PAIN OF BOTH KNEES: ICD-10-CM

## 2022-12-29 DIAGNOSIS — E78.1 PURE HYPERTRIGLYCERIDEMIA: ICD-10-CM

## 2022-12-29 DIAGNOSIS — M25.561 CHRONIC PAIN OF BOTH KNEES: ICD-10-CM

## 2022-12-29 DIAGNOSIS — E11.9 TYPE 2 DIABETES MELLITUS WITHOUT COMPLICATION, WITHOUT LONG-TERM CURRENT USE OF INSULIN (HCC): ICD-10-CM

## 2022-12-29 DIAGNOSIS — G89.29 CHRONIC PAIN OF BOTH KNEES: ICD-10-CM

## 2022-12-29 DIAGNOSIS — R01.1 HEART MURMUR: ICD-10-CM

## 2022-12-29 DIAGNOSIS — E03.9 ACQUIRED HYPOTHYROIDISM: ICD-10-CM

## 2022-12-29 DIAGNOSIS — Z00.00 PE (PHYSICAL EXAM), ANNUAL: ICD-10-CM

## 2022-12-29 DIAGNOSIS — F45.8 BRUXISM (TEETH GRINDING): ICD-10-CM

## 2022-12-29 DIAGNOSIS — M26.629 TMJ SYNDROME: ICD-10-CM

## 2022-12-29 DIAGNOSIS — Z53.20 STATIN DECLINED: ICD-10-CM

## 2022-12-29 PROBLEM — L71.9 ROSACEA: Status: RESOLVED | Noted: 2018-07-19 | Resolved: 2022-12-29

## 2022-12-29 PROCEDURE — 99396 PREV VISIT EST AGE 40-64: CPT | Performed by: FAMILY MEDICINE

## 2022-12-29 RX ORDER — BLOOD SUGAR DIAGNOSTIC
STRIP MISCELLANEOUS
Qty: 100 STRIP | Refills: 5 | Status: SHIPPED | OUTPATIENT
Start: 2022-12-29 | End: 2023-02-10

## 2022-12-29 ASSESSMENT — FIBROSIS 4 INDEX: FIB4 SCORE: 0.64

## 2022-12-29 NOTE — PROGRESS NOTES
Subjective:   CC: Annual physical    HPI:     Shona Garcia is a 62 y.o. female, established patient of the clinic.     Patient has chronic type 2 diabetes, hypothyroidism, hyperlipidemia, chronic bilateral knee pain.  Patient is taking all medication as directed.  However, patient only started empagliflozin and glimepiride 2 months ago due to insurance problems.  Patient tolerated both medication well, no side effect reported.  Patient was seen by urgent care a few days ago for acute UTI.  Symptoms are responsive to nitrofurantoin.    Patient is active, but does not exercise regularly.  Negative history of proximal, alcohol, tobacco abuse.      For diabetes, negative visual changes, concerning lesion on her feet, symptoms of polyneuropathy.    Current medicines (including changes today)  Current Outpatient Medications   Medication Sig Dispense Refill    glucose blood (ONETOUCH VERIO) strip USE  STRIP TO CHECK GLUCOSE ONCE DAILY OR  AS  NEEDED  FOR  SYMPTOMS  OF  LOW/HIGH  BLOOD  SUGAR 100 Strip 5    nitrofurantoin (MACROBID) 100 MG Cap Take 1 Capsule by mouth every 12 hours for 5 days. 10 Capsule 0    phenazopyridine (PYRIDIUM) 200 MG Tab Take 1 Tablet by mouth 3 times a day as needed for Moderate Pain or Mild Pain. 6 Tablet 0    glimepiride (AMARYL) 4 MG Tab Take 1 Tablet by mouth every morning. 90 Tablet 1    Empagliflozin 10 MG Tab Take 1 Tablet by mouth every day. Prior to first meal of the day 90 Tablet 1    Lancets (ONETOUCH DELICA PLUS [id number]) Misc Check fasting blood sugar once daily or as needed for symptoms of high/low blood sugar 100 Each 2    levothyroxine (SYNTHROID) 75 MCG Tab Take 1 Tablet by mouth every morning on an empty stomach. 90 Tablet 3    azelastine (ASTELIN) 137 MCG/SPRAY nasal spray Administer 2 Sprays into affected nostril(S) 2 times a day. 30 mL 5    Diclofenac Sodium 1 % Gel Apply to 2-4 gram to affected area 4 times daily 100 g 1    triamcinolone acetonide (KENALOG)  "0.1 % Cream Apply to affected area twice daily 1 Tube 2    Omega-3 Fatty Acids (OMEGA 3 PO) Take  by mouth.      vitamin D (CHOLECALCIFEROL) 1000 UNIT TABS Take 2 Tablets by mouth every day.       No current facility-administered medications for this visit.     She  has a past medical history of Allergy, Anemia, Blood transfusion (1986), Diabetes mellitus type II (4/18/2011), Dyslipidemia (8/28/2013), GERD (gastroesophageal reflux disease), Headache(784.0), Heart murmur, Measles, Pyelonephritis (2002), S/P colonoscopy (2010), Thyroid disease, Uterine fibroid (1986), Uterine fibroid, and Varicella.    She has no past medical history of Addisons disease (HCC), Adrenal disorder (HCC), Anxiety, Arrhythmia, ASTHMA, CATARACT, CHF (congestive heart failure) ([facility]), Clotting disorder (HCC), COPD, Cushings syndrome ([facility]), Depression, EMPHYSEMA, Glaucoma, Goiter, Heart attack (HCC), Hypertension, IBD (inflammatory bowel disease), Meningitis, Migraine, Muscle disorder, OSTEOPOROSIS, Parathyroid disorder (HCC), Pituitary disease (HCC), Seizure (HCC), Stroke (HCC), Substance abuse (HCC), Tuberculosis, Ulcer, or Urinary tract infection, site not specified.    I reviewed patient's problem list, allergies, medications, family hx, social hx with patient and update EPIC.        Objective:     /70 (BP Location: Right arm, Patient Position: Sitting, BP Cuff Size: Adult)   Pulse 81   Temp 36.1 °C (96.9 °F) (Temporal)   Ht 1.6 m (5' 3\")   Wt 61.7 kg (136 lb)   SpO2 97%  Body mass index is 24.09 kg/m².    Physical Exam:  Constitutional: awake, alert, in no distress.  Skin: Warm, dry, good turgor, no rashes, bruises, ulcers in visible areas.  Eye: conjunctiva clear, lids neg for edema or lesions.  Neck: Trachea midline, no masses, no thyromegaly. No cervical or supraclavicular lymphadenopathy  Respiratory: Unlabored respiratory effort, lungs clear to auscultation, no wheezes, no rales.  Cardiovascular: Normal S1, S2, grade 3/6 " systolic ejection murmur best heard at the left upper sternum, no pedal edema.  Abdomen: Soft, obese, non-tender to palpation, active BS, no hernia, no hepatosplenomegaly, negative rebound or guarding.   Psych: Oriented x3, affect and mood wnl, intact judgement and insight.       Assessment and Plan:   The following treatment plan was discussed    1. Type 2 diabetes mellitus without complication, without long-term current use of insulin (HCC)  Chronic, poorly controlled due to noncompliance.  Patient only started on Jardiance 10 mg and glimepiride 4 mg daily about 2 months ago due to insurance problems.  She tolerated both medication well, no side effect reported.  Her A1c reduces from 8.1 to 7.5.  Negative hypoglycemia reported  - Continue empagliflozin 10 mg daily and glimepiride 4 mg daily  - HEMOGLOBIN A1C; Future  - CBC WITH DIFFERENTIAL; Future  - Comp Metabolic Panel; Future  - MICROALBUMIN CREAT RATIO URINE; Future  - glucose blood (ONETOUCH VERIO) strip; USE  STRIP TO CHECK GLUCOSE ONCE DAILY OR  AS  NEEDED  FOR  SYMPTOMS  OF  LOW/HIGH  BLOOD  SUGAR  Dispense: 100 Strip; Refill: 5  - dietary modification, exercise, weight loss  - Annual eye exam  - Regular foot exam  - Discussed prevention and management of hypoglycemia  - Side effects of all medications discussed with patient  - Follow up in 3 months.     2. Acquired hypothyroidism  Chronic, controlled with levothyroxine 75 mcg daily., no s/e reported, will continue.    - TSH; Future    3. Pure hypertriglyceridemia  4. Statin declined  Chronic, mild, diet controlled.  Patient declines statin despite known history of poorly controlled type 2 diabetes.  Risks/benefits previously discussed with patient.  - Lipid Profile; Future  - dietary modification, exercise, and weight loss.   - avoid alcohol, drugs, tobacco products     5. Heart murmur  Chronic systolic ejection murmur, grade 3/6, best heard at the left upper sternum, negative echocardiogram in 2021.  Patient is asymptomatic, will continue to monitor.    6. TMJ syndrome  7. Bruxism (teeth grinding)  Takes Flexeril as needed  Currently asymptomatic, will monitor.    8. Chronic pain of both knees  Controlled with over-the-counter analgesic PRN for pain.   -Weight loss, activity modification    9. PE (physical exam), annual  I reviewed PMH, social history, family history.   I reviewed and updated vaccines records. Vaccine counseling provided.   I reviewed and discussed all age-appropriate preventive cares.    General health/wellness and anticipatory guidance provided.      Recommended COVID booster.    Jasmin Hall M.D.      Followup: Return in about 3 months (around 3/29/2023) for annual PE.    Please note that this dictation was created using voice recognition software. I have made every reasonable attempt to correct obvious errors, but I expect that there are errors of grammar and possibly content that I did not discover before finalizing the note.

## 2023-01-20 ENCOUNTER — OCCUPATIONAL MEDICINE (OUTPATIENT)
Dept: URGENT CARE | Facility: CLINIC | Age: 63
End: 2023-01-20
Payer: COMMERCIAL

## 2023-01-20 ENCOUNTER — APPOINTMENT (OUTPATIENT)
Dept: RADIOLOGY | Facility: IMAGING CENTER | Age: 63
End: 2023-01-20
Attending: PHYSICIAN ASSISTANT
Payer: COMMERCIAL

## 2023-01-20 VITALS
SYSTOLIC BLOOD PRESSURE: 126 MMHG | HEIGHT: 63 IN | OXYGEN SATURATION: 92 % | DIASTOLIC BLOOD PRESSURE: 80 MMHG | TEMPERATURE: 97.4 F | BODY MASS INDEX: 24.36 KG/M2 | RESPIRATION RATE: 18 BRPM | WEIGHT: 137.5 LBS | HEART RATE: 75 BPM

## 2023-01-20 DIAGNOSIS — M53.3 COCCYGEAL PAIN, ACUTE: ICD-10-CM

## 2023-01-20 DIAGNOSIS — Z02.1 PRE-EMPLOYMENT DRUG SCREENING: ICD-10-CM

## 2023-01-20 DIAGNOSIS — S30.0XXA CONTUSION OF COCCYX, INITIAL ENCOUNTER: ICD-10-CM
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
## 2023-01-20 DIAGNOSIS — S30.0XXA CONTUSION OF SACRUM, INITIAL ENCOUNTER: ICD-10-CM

## 2023-01-20 DIAGNOSIS — Z02.83 ENCOUNTER FOR DRUG SCREENING: ICD-10-CM

## 2023-01-20 LAB
AMP AMPHETAMINE: NORMAL
BREATH ALCOHOL COMMENT: NORMAL
COC COCAINE: NORMAL
INT CON NEG: NORMAL
INT CON POS: NORMAL
MET METHAMPHETAMINES: NORMAL
OPI OPIATES: NORMAL
PCP PHENCYCLIDINE: NORMAL
POC BREATHALIZER: 0 PERCENT (ref 0–0.01)
POC DRUG COMMENT 753798-OCCUPATIONAL HEALTH: NEGATIVE
THC: NORMAL

## 2023-01-20 PROCEDURE — 82075 ASSAY OF BREATH ETHANOL: CPT | Performed by: PHYSICIAN ASSISTANT

## 2023-01-20 PROCEDURE — 80305 DRUG TEST PRSMV DIR OPT OBS: CPT | Performed by: PHYSICIAN ASSISTANT

## 2023-01-20 PROCEDURE — 72220 X-RAY EXAM SACRUM TAILBONE: CPT | Mod: TC | Performed by: FAMILY MEDICINE

## 2023-01-20 PROCEDURE — 99214 OFFICE O/P EST MOD 30 MIN: CPT | Performed by: PHYSICIAN ASSISTANT

## 2023-01-20 RX ORDER — CELECOXIB 200 MG/1
200 CAPSULE ORAL
Qty: 30 CAPSULE | Refills: 0 | Status: SHIPPED | OUTPATIENT
Start: 2023-01-20 | End: 2023-03-03 | Stop reason: SDUPTHER

## 2023-01-20 ASSESSMENT — FIBROSIS 4 INDEX: FIB4 SCORE: 0.64

## 2023-01-20 ASSESSMENT — ENCOUNTER SYMPTOMS: BACK PAIN: 1

## 2023-01-20 NOTE — LETTER
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    Medical Information   Related to Industrial Injury? Yes  Comments:Slip and fall on ice on way into work    Subjective Complaints:  DOI 1/17/21  Employer: [employer]  Job title   This is a pleasant 62-year-old female who slipped and fell on ice on her way in from the parking area while walking on a walkway.  She was walking into start working the day.  She reports she fell onto curb with buttock.  No loss of consciousness.  She experienced acute pain in the tailbone area.  She crawled and got up.  Pain has persisted since then.  She believes that it would have gone away by now.  Pain is aggravated with sitting and lying down.  Tried tylenol.  Does not tolerate ibuprofen due to GI side effects.  Pain is located at tailbone.  No leg pain, n/t, or weakness.  No bowel or bladder dysfunction.  Tried a donut type pillow and ice.   Objective Findings: There is diffuse ttp to lower sacrum and sacrococcygeal region  No palpable step-off or deformity  No obvious ecchymosis  Lumbar spine ROM diminished by 25 % in a multiplanar fashion  Motor strength 5/5 b/l LE's  Sensation intact to light touch and pinprick  DTRs 1+  No Clonus  Straight leg raise is negative  Distal pulses are intact  Gait is non-antalgic       Pre-Existing  Condition(s): None   Assessment:   Initial Visit    Status: Additional Care Required  Permanent Disability:No    Plan: Medication  Comments:OTC Tylenol, Rx Celebrex as needed for pain    Diagnostics: X-ray    Comments:    RADIOLOGY RESULTS  DX-SACRUM AND COCCYX 2+    Result Date: 1/20/2023 1/20/2023 3:25 PM HISTORY/REASON FOR EXAM:  Pelvic pain. Multiple falls. TECHNIQUE/EXAM DESCRIPTION AND NUMBER OF VIEWS:  3 views of the sacrum and coccyx. COMPARISON: None. FINDINGS: There are no fractures or dislocations.  No blastic or lytic lesions.  The sacral neural arches are intact.     Negative sacrum and coccyx exam.          *X-rays were reviewed and interpreted independently by me. I agree with the radiologist's findings       Disability Information   Status: Released to Restricted Duty    From:  1/20/2023  Through: 1/27/2023 Restrictions are: Temporary   Physical Restrictions   Sitting:    Standing:    Stooping:    Bending:      Squatting:    Walking:    Climbing:    Pushing:      Pulling:    Other:    Reaching Above Shoulder (L):   Reaching Above Shoulder (R):       Reaching Below Shoulder (L):    Reaching Below Shoulder (R):      Not to exceed Weight Limits   Carrying(hrs):   Weight Limit(lb):   Lifting(hrs):   Weight  Limit(lb):     Comments: No radiographic evidence of bony abnormality  Please allow frequent sit to stand as needed, usage of pillow for sitting  Ice 20 minutes every 1-2 hours to affected area  Please limit walking to minimum and workplace  Patient does not tolerate ibuprofen or Aleve, Rx Celebrex prescribed due to GI intolerance  Recommend donut pillow or soft pillow when sitting  Return in 1 week for follow-up          Repetitive Actions   Hands: i.e. Fine Manipulations from Grasping:     Feet: i.e. Operating Foot Controls:     Driving / Operate Machinery:     Health Care Provider’s Original or Electronic Signature  Ling Rodríguez P.A.-C. Health Care Provider’s Original or Electronic  Signature    Filiberto Ruggiero DO MPH     Clinic Name / Location: [address]  [place] [address] Clinic Phone Number: Dept: [phone]   Appointment Time: 12:45 Pm Visit Start Time: 2:49 PM   Check-In Time:  12:42 Pm Visit Discharge Time: 4:25 PM    Original-Treating Physician or Chiropractor    Page 2-Insurer/TPA    Page 3-Employer    Page 4-Employee

## 2023-01-20 NOTE — LETTER
"EMPLOYEE’S CLAIM FOR COMPENSATION/ REPORT OF INITIAL TREATMENT  FORM C-4    EMPLOYEE’S CLAIM - PROVIDE ALL INFORMATION REQUESTED   First Name  Shona Last Name  Radha Birthdate                    1960                Sex  female Claim Number (Insurer’s Use Only)    Home Address  [address] Age  62 y.o. 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 (if applicable)  Slipped on ice    How did this injury or occupational disease occur? (Be specific an answer in detail. 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  n/a Witnesses to the Accident  n/a      Nature of Injury or Occupational Disease  Defer  Part(s) of Body Injured or Affected  Lower Back Area (Lumbar Area & Lumbo-Sacral), ,     I certify that the above is true and correct to the best of my knowledge and that I have provided this " information in order to obtain the benefits of [place]  I hereby authorize any physician, chiropractor, surgeon, practitioner, or other person, any hospital, including [facility] or [facility], any medical service organization, any insurance company, or other institution or organization to release to each other, any medical or other information, including benefits paid or payable, pertinent to this injury or disease, except information relative to diagnosis, treatment and/or counseling for AIDS, psychological conditions, alcohol or controlled substances, for which I must give specific authorization.  A Photostat of this authorization shall be as valid as the original.     Date   Place Employee’s Original or  *Electronic Signature   THIS REPORT MUST BE COMPLETED AND MAILED WITHIN 3 WORKING DAYS OF TREATMENT   Place  [facility]  Name of Facility  [facility]   Date  1/20/2023 Diagnosis and Description of Injury or Occupational Disease  (M53.3) Coccygeal pain, acute  (S30.0XXA) Contusion of coccyx, initial encounter  (S30.0XXA) Contusion of sacrum, initial encounter  (W00.9XXA) Fall due to slipping on ice or snow, initial encounter Is there evidence the injured employee was under the influence of alcohol and/or another controlled substance at the time of accident?  ? No ? Yes (if yes, please explain)    Hour  2:49 PM   Diagnoses of Coccygeal pain, acute, Contusion of coccyx, initial encounter, Contusion of sacrum, initial encounter, and Fall due to slipping on ice or snow, initial encounter were pertinent to this visit. No   Treatment  No radiographic evidence of bony abnormality  Please allow frequent sit to stand as needed, usage of pillow for sitting  Ice 20 minutes every 1-2 hours to affected area  Please limit walking to minimum and workplace  Patient does not tolerate ibuprofen  or Aleve, Rx Celebrex prescribed due to GI intolerance  Recommend donut pillow or soft pillow when sitting  Return in 1 week for follow-up  Have you advised the patient to remain off work five days or     more?    X-Ray Findings  Negative   ? Yes Indicate dates:   From   To      From information given by the employee, together with medical evidence, can        you directly connect this injury or occupational disease as job incurred?    Comments:Patient on way into work, slip and fall on ice ? No If no, is the injured employee capable of:  ? full duty  No ? modified duty  Yes   Is additional medical care by a physician indicated?  Yes If Modified Duty, Specify any Limitations / Restrictions  See D39   Do you know of any previous injury or disease contributing to this condition or occupational disease?  ? Yes ? No (Explain if yes)                          No   Date  1/20/2023 Print Health Care Provider's   Ling Rodríguez P.A.-C. I certify the employer’s copy of  this form was mailed on:   Address  [address] [place] [address] Insurer’s Use Only     [facility] Zip  [address]    Provider’s Tax ID Number  [id number] Telephone  Dept: [phone]             Health Care Provider’s Original or Electronic Signature  e-SignLING RODRÍGUEZ P.A.-C. Degree (MD,DO, DC,PANolanC,APRN)   PAGEOFF      * Complete and attach Release of Information (Form C-4A) when injured employee signs C-4 Form electronically  ORIGINAL - TREATING HEALTHCARE PROVIDER PAGE 2 - INSURER/TPA PAGE 3 - EMPLOYER PAGE 4 - EMPLOYEE             Form C-4 (rev.08/21)           BRIEF DESCRIPTION OF RIGHTS AND BENEFITS  (Pursuant to [place] 616C.050)    Notice of Injury or Occupational Disease (Incident Report Form C-1): If an injury or occupational disease (OD) arises out of and in the course of employment, you must provide written notice to your employer as soon as practicable, but no later than 7 days after the accident or OD. Your employer shall maintain a  "sufficient supply of the required forms.    Claim for Compensation (Form C-4): If medical treatment is sought, the form C-4 is available at the place of initial treatment. A completed \"Claim for Compensation\" (Form C-4) must be filed within 90 days after an accident or OD. The treating physician or chiropractor must, within 3 working days after treatment, complete and mail to the employer, the employer's insurer and third-party , the Claim for Compensation.    Medical Treatment: If you require medical treatment for your on-the-job injury or OD, you may be required to select a physician or chiropractor from a list provided by your workers’ compensation insurer, if it has contracted with an Organization for Managed Care (MCO) or Preferred Provider Organization (PPO) or providers of health care. If your employer has not entered into a contract with an MCO or PPO, you may select a physician or chiropractor from the Panel of Physicians and Chiropractors. Any medical costs related to your industrial injury or OD will be paid by your insurer.    Temporary Total Disability (TTD): If your doctor has certified that you are unable to work for a period of at least 5 consecutive days, or 5 cumulative days in a 20-day period, or places restrictions on you that your employer does not accommodate, you may be entitled to TTD compensation.    Temporary Partial Disability (TPD): If the wage you receive upon reemployment is less than the compensation for TTD to which you are entitled, the insurer may be required to pay you TPD compensation to make up the difference. TPD can only be paid for a maximum of 24 months.    Permanent Partial Disability (PPD): When your medical condition is stable and there is an indication of a PPD as a result of your injury or OD, within 30 days, your insurer must arrange for an evaluation by a rating physician or chiropractor to determine the degree of your PPD. The amount of your PPD award " depends on the date of injury, the results of the PPD evaluation, your age and wage.    Permanent Total Disability (PTD): If you are medically certified by a treating physician or chiropractor as permanently and totally disabled and have been granted a PTD status by your insurer, you are entitled to receive monthly benefits not to exceed 66 2/3% of your average monthly wage. The amount of your PTD payments is subject to reduction if you previously received a lump-sum PPD award.    Vocational Rehabilitation Services: You may be eligible for vocational rehabilitation services if you are unable to return to the job due to a permanent physical impairment or permanent restrictions as a result of your injury or occupational disease.    Transportation and Per Lizy Reimbursement: You may be eligible for travel expenses and per lizy associated with medical treatment.    Reopening: You may be able to reopen your claim if your condition worsens after claim closure.     Appeal Process: If you disagree with a written determination issued by the insurer or the insurer does not respond to your request, you may appeal to the Department of Administration, , by following the instructions contained in your determination letter. You must appeal the determination within 70 days from the date of the determination letter at [address], [address], [place], [place] [address], or [address], [place] [address]. If you disagree with the  decision, you may appeal to the Department of Administration, . You must file your appeal within 30 days from the date of the  decision letter at [address], [address], [place], [place] [address], or [address] [facility] [address], [place] [address]. If you disagree with a decision of an , you may file a petition for judicial review with the District Court. You must do so within 30  days of the Appeal Officer’s decision. You may be represented by an  at your own expense or you may contact the [facility] for possible representation.    [place]  for Injured Workers (NAIW): If you disagree with a  decision, you may request that NAIW represent you without charge at an  Hearing. For information regarding denial of benefits, you may contact the [facility] at: [address] [facility], [address], [place] [address], [phone], or [address], [address], [place] [address], [phone]    To File a Complaint with the Division: If you wish to file a complaint with the  of the Division of Industrial Relations (DIR),  please contact the Workers’ Compensation Section, [address], [address], [place], [place] [address], telephone (542) [phone], or [address] [facility] [address] [facility] [address], [place], [place] [address], telephone [phone].    For assistance with Workers’ Compensation Issues: You may contact the [facility] Office for Consumer Health Assistance, [address], [address], [place], [place] [address], Toll Free 1-528.552.5847, Web site: http://[facility].[place].gov/Programs/JUAN E-mail: [email or web address].[facility]              __________________________________________________________________                                    _________________            Employee Name / Signature                                                                                                                            Date                                                                                                                                                                                                                              D-2 (rev. 10/20)

## 2023-01-21 NOTE — PROGRESS NOTES
"Subjective     Anathasia Israel Garcia is a 62 y.o. female who presents with Back Pain (Lower back,coccyx  injury/pain/NEW WC/ X 01/17/23)      DOI 1/17/21  Employer: [employer]  Job title   This is a pleasant 62-year-old female who slipped and fell on ice on her way in from the parking area while walking on a walkway.  She was walking into start working the day.  She reports she fell onto curb with buttock.  No loss of consciousness.  She experienced acute pain in the tailbone area.  She crawled and got up.  Pain has persisted since then.  She believes that it would have gone away by now.  Pain is aggravated with sitting and lying down.  Tried tylenol.  Does not tolerate ibuprofen due to GI side effects.  Pain is located at tailbone.  No leg pain, n/t, or weakness.  No bowel or bladder dysfunction.  Tried a donut type pillow and ice.     Back Pain      Review of Systems   Musculoskeletal:  Positive for back pain.            Objective     /80 (BP Location: Left arm, Patient Position: Sitting)   Pulse 75   Temp 36.3 °C (97.4 °F) (Temporal)   Resp 18   Ht 1.6 m (5' 3\")   Wt 62.4 kg (137 lb 8 oz)   LMP 08/20/2011   SpO2 92%   BMI 24.36 kg/m²      Physical Exam    There is diffuse ttp to lower sacrum and sacrococcygeal region  No palpable step-off or deformity  No obvious ecchymosis  Lumbar spine ROM diminished by 25 % in a multiplanar fashion  Motor strength 5/5 b/l LE's  Sensation intact to light touch and pinprick  DTRs 1+  No Clonus  Straight leg raise is negative  Distal pulses are intact  Gait is non-antalgic            RADIOLOGY RESULTS   DX-SACRUM AND COCCYX 2+    Result Date: 1/20/2023 1/20/2023 3:25 PM HISTORY/REASON FOR EXAM:  Pelvic pain. Multiple falls. TECHNIQUE/EXAM DESCRIPTION AND NUMBER OF VIEWS:  3 views of the sacrum and coccyx. COMPARISON: None. FINDINGS: There are no fractures or dislocations.  No blastic or lytic lesions.  The sacral neural arches are intact. "     Negative sacrum and coccyx exam.         *X-rays were reviewed and interpreted independently by me. I agree with the radiologist's findings                Assessment & Plan        1. Coccygeal pain, acute    - DX-SACRUM AND COCCYX 2+; Future  - celecoxib (CELEBREX) 200 MG Cap; Take 1 Capsule by mouth 1 time a day as needed for Moderate Pain.  Dispense: 30 Capsule; Refill: 0    2. Contusion of coccyx, initial encounter      3. Contusion of sacrum, initial encounter      4. Fall due to slipping on ice or snow, initial encounter       No radiographic evidence of bony abnormality  Please allow frequent sit to stand as needed, usage of pillow for sitting  Ice 20 minutes every 1-2 hours to affected area  Please limit walking to minimum and workplace  Patient does not tolerate ibuprofen or Aleve, Rx Celebrex prescribed due to GI intolerance  Recommend donut pillow or soft pillow when sitting  Return in 1 week for follow-up   Greater than 30 minutes were spent filling out C4 D 39 forms, physical examination, interpretation of diagnostic imaging, education regarding injury.

## 2023-01-27 ENCOUNTER — OCCUPATIONAL MEDICINE (OUTPATIENT)
Dept: URGENT CARE | Facility: CLINIC | Age: 63
End: 2023-01-27
Payer: COMMERCIAL

## 2023-01-27 VITALS
DIASTOLIC BLOOD PRESSURE: 80 MMHG | HEART RATE: 74 BPM | TEMPERATURE: 97.3 F | SYSTOLIC BLOOD PRESSURE: 122 MMHG | OXYGEN SATURATION: 99 % | RESPIRATION RATE: 12 BRPM | BODY MASS INDEX: 24.31 KG/M2 | HEIGHT: 63 IN | WEIGHT: 137.2 LBS

## 2023-01-27 DIAGNOSIS — M53.3 COCCYGEAL PAIN, ACUTE: ICD-10-CM

## 2023-01-27 DIAGNOSIS — S30.0XXD CONTUSION OF SACRUM, SUBSEQUENT ENCOUNTER: ICD-10-CM

## 2023-01-27 PROCEDURE — 99213 OFFICE O/P EST LOW 20 MIN: CPT | Performed by: PHYSICIAN ASSISTANT

## 2023-01-27 RX ORDER — LIDOCAINE 4 G/G
PATCH TOPICAL
Qty: 15 PATCH | Refills: 0 | Status: SHIPPED | OUTPATIENT
Start: 2023-01-27

## 2023-01-27 ASSESSMENT — FIBROSIS 4 INDEX: FIB4 SCORE: 0.64

## 2023-01-27 NOTE — PROGRESS NOTES
"Subjective:     Shona Garcia is a 62 y.o. female who presents for Follow-Up (WC [facility] DOI: 1/17/23 Lower back pain feels a bit better)      DOI 1/17/21  Employer: [employer]  Job title   Copied from initial visit on 1/20/23: \"This is a pleasant 62-year-old female who slipped and fell on ice on her way in from the parking area while walking on a walkway.  She was walking into start working the day.  She reports she fell onto curb with buttock.  No loss of consciousness.  She experienced acute pain in the tailbone area.  She crawled and got up.  Pain has persisted since then.  She believes that it would have gone away by now.  Pain is aggravated with sitting and lying down.  Tried tylenol.  Does not tolerate ibuprofen due to GI side effects.  Pain is located at tailbone.  No leg pain, n/t, or weakness.  No bowel or bladder dysfunction.  Tried a donut type pillow and ice.\"     Negative x-ray     Follow up today 1/27: Patient reports improvement of her low back/tailbone pain.  About 50% improvement.  Tolerating work restrictions.  Anti-inflammatory helps with the pain temporarily.  She has been applying ice.  Heat helps more.  Currently, pain is 5/10 in severity with sitting on it worse with certain positions and laying backwards.  Denies any numbness, tingling, weakness, loss of bowel or bladder control, radiation pain to extremities.  Denies a second job.  Denies a prior low back/tailbone injury.    PMH:   No pertinent past medical history to this problem  MEDS:  Medications were reviewed in EMR  ALLERGIES:  Allergies were reviewed in EMR  SOCHX:  Works as a .   FH:   No pertinent family history to this problem       Objective:     /80 (BP Location: Left arm, Patient Position: Sitting, BP Cuff Size: Adult)   Pulse 74   Temp 36.3 °C (97.3 °F) (Temporal)   Resp 12   Ht 1.6 m (5' 3\")   Wt 62.2 kg (137 lb 3.2 oz)   LMP 08/20/2011   SpO2 99%   BMI 24.30 kg/m² "     Constitutional: Well-appearing in no acute distress  Back: Full range of flexion, extension, rotation, lateralization.   Mild diffuse tenderness to sacral area and coccyx.   Negative midline spinal tenderness, crepitus, deformities, or step-offs.  Neuro: strength 5/5 bilateral lower extremities. Normal gait.             Assessment/Plan:       1. Coccygeal pain, acute  - Lidocaine 4 % Patch; Apply patch to painful area. Patch may remain in place for up to 12 hours in any 24-hour period. No more than 1 patch can be used in a 24-hour period.  Dispense: 15 Patch; Refill: 0    2. Contusion of sacrum, subsequent encounter    Released to Restricted Duty FROM 1/27/2023 TO 2/3/2023  -Please allow frequent sit to stand as needed, usage of pillow for sitting  -Allow Ice and heat 10 minutes every 1-2 hours to affected area as needed   - No lifting more than 10 lbs. No repetitive bending.       Plan:   - trial of lidocaine patches.  Continue ice and then heat, gentle massage, gentle range of motion exercises and stretches as tolerated.  Continue NSAID for pain as prescribed since this helps.  -Continue work restrictions per D 39  -Follow-up in 1 week    Differential diagnosis, natural history, supportive care, and indications for immediate follow-up discussed.

## 2023-01-27 NOTE — LETTER
"   [facility]  [address] [place] [address]  Phone:  [phone] - Fax:  [phone]   [facility] Progress Report and Disability Certification  Date of Service: 1/27/2023   No Show:  No  Date / Time of Next Visit: 2/3/2023   Claim Information   Patient Name: Shona Garcia  Claim Number:     Employer: [employer] Date of Injury: 1/17/2023     Insurer / TPA: [facility]  ID / SSN:     Occupation: [employer]IOR [employer]  Diagnosis: Diagnoses of Coccygeal pain, acute and Contusion of sacrum, subsequent encounter were pertinent to this visit.    Medical Information   Related to Industrial Injury? Yes    Subjective Complaints:  DOI 1/17/21  Employer: [employer]  Job title   Copied from initial visit on 1/20/23: \"This is a pleasant 62-year-old female who slipped and fell on ice on her way in from the parking area while walking on a walkway.  She was walking into start working the day.  She reports she fell onto curb with buttock.  No loss of consciousness.  She experienced acute pain in the tailbone area.  She crawled and got up.  Pain has persisted since then.  She believes that it would have gone away by now.  Pain is aggravated with sitting and lying down.  Tried tylenol.  Does not tolerate ibuprofen due to GI side effects.  Pain is located at tailbone.  No leg pain, n/t, or weakness.  No bowel or bladder dysfunction.  Tried a donut type pillow and ice.\"     Negative x-ray     Follow up today 1/27: Patient reports improvement of her low back/tailbone pain.  About 50% improvement.  Tolerating work restrictions.  Anti-inflammatory helps with the pain temporarily.  She has been applying ice.  Heat helps more.  Currently, pain is 5/10 in severity with sitting on it worse with certain positions and laying backwards.  Denies any numbness, tingling, weakness, loss of bowel or bladder control, radiation pain to extremities.  Denies a second job.  Denies a " prior low back/tailbone injury.   Objective Findings: Constitutional: Well-appearing in no acute distress  Back: Full range of flexion, extension, rotation, lateralization.   Mild diffuse tenderness to sacral area and coccyx.   Negative midline spinal tenderness, crepitus, deformities, or step-offs.  Neuro: strength 5/5 bilateral lower extremities. Normal gait.            Pre-Existing Condition(s):     Assessment:   Condition Improved    Status: Additional Care Required  Permanent Disability:No    Plan:      Diagnostics:      Comments:  Plan:   - trial of lidocaine patches.  Continue ice and then heat, gentle massage, gentle range of motion exercises and stretches as tolerated.  Continue NSAID for pain as prescribed since this helps.  -Continue work restrictions per D 39  -Follow-up in 1 week    Disability Information   Status: Released to Restricted Duty    From:  1/27/2023  Through: 2/3/2023 Restrictions are: Temporary   Physical Restrictions   Sitting:    Standing:    Stooping:    Bending:      Squatting:    Walking:    Climbing:    Pushing:      Pulling:    Other:    Reaching Above Shoulder (L):   Reaching Above Shoulder (R):       Reaching Below Shoulder (L):    Reaching Below Shoulder (R):      Not to exceed Weight Limits   Carrying(hrs):   Weight Limit(lb):   Lifting(hrs):   Weight  Limit(lb):     Comments: -Please allow frequent sit to stand as needed, usage of pillow for sitting  -Allow Ice and heat 10 minutes every 1-2 hours to affected area as needed   - No lifting more than 10 lbs. No repetitive bending.         Repetitive Actions   Hands: i.e. Fine Manipulations from Grasping:     Feet: i.e. Operating Foot Controls:     Driving / Operate Machinery:     Health Care Provider’s Original or Electronic Signature  Fei Rosenthal P.A.-C. Health Care Provider’s Original or Electronic Signature    Filiberto Ruggiero DO MPH     Clinic Name / Location: [address]  [place]  [address] Clinic Phone Number: Dept: [phone]   Appointment Time: 9:00 Am Visit Start Time: 9:37 AM   Check-In Time:  8:18 Am Visit Discharge Time: 10:23 AM    Original-Treating Physician or Chiropractor    Page 2-Insurer/TPA    Page 3-Employer    Page 4-Employee

## 2023-02-10 ENCOUNTER — OCCUPATIONAL MEDICINE (OUTPATIENT)
Dept: URGENT CARE | Facility: CLINIC | Age: 63
End: 2023-02-10
Payer: COMMERCIAL

## 2023-02-10 VITALS
WEIGHT: 138.8 LBS | RESPIRATION RATE: 14 BRPM | SYSTOLIC BLOOD PRESSURE: 162 MMHG | HEART RATE: 79 BPM | HEIGHT: 63 IN | TEMPERATURE: 98.2 F | DIASTOLIC BLOOD PRESSURE: 90 MMHG | OXYGEN SATURATION: 98 % | BODY MASS INDEX: 24.59 KG/M2

## 2023-02-10 DIAGNOSIS — R03.0 ELEVATED BLOOD PRESSURE READING: ICD-10-CM
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
## 2023-02-10 DIAGNOSIS — S30.0XXD: ICD-10-CM

## 2023-02-10 PROCEDURE — 99213 OFFICE O/P EST LOW 20 MIN: CPT | Performed by: FAMILY MEDICINE

## 2023-02-10 ASSESSMENT — FIBROSIS 4 INDEX: FIB4 SCORE: 0.64

## 2023-02-10 NOTE — PROGRESS NOTES
"Subjective     Anathasia Israel Garcia is a 62 y.o. female who presents with Follow-Up (WC [facility] DOI: 1/17/23 Back feels better)      \" DOI 1/17/21 Employer: [employer] Job title . his is a pleasant 62-year-old female who slipped and fell on ice on her way in from the parking area while walking on a walkway.  She was walking into start working the day.  She reports she fell onto curb with buttock.  No loss of consciousness.  She experienced acute pain in the tailbone area.  She crawled and got up.  Pain has persisted since then.  She believes that it would have gone away by now.  Pain is aggravated with sitting and lying down.  Tried tylenol.  Does not tolerate ibuprofen due to GI side effects.  Pain is located at tailbone.  No leg pain, n/t, or weakness.  No bowel or bladder dysfunction.  Tried a donut type pillow and ice. \"    * Today 2/10/2023 feels about 90% better, mild ache now to lower back sacrum region      HPI    Review of Systems   All other systems reviewed and are negative.           Objective     BP (!) 162/90 (BP Location: Left arm, Patient Position: Sitting, BP Cuff Size: Adult)   Pulse 79   Temp 36.8 °C (98.2 °F) (Temporal)   Resp 14   Ht 1.6 m (5' 3\")   Wt 63 kg (138 lb 12.8 oz)   LMP 08/20/2011   SpO2 98%   BMI 24.59 kg/m²      Physical Exam  Vitals and nursing note reviewed.   Constitutional:       General: She is not in acute distress.     Appearance: Normal appearance. She is well-developed.   HENT:      Head: Normocephalic.   Pulmonary:      Effort: Pulmonary effort is normal. No respiratory distress.   Neurological:      Mental Status: She is alert.      Motor: No abnormal muscle tone.   Psychiatric:         Mood and Affect: Mood normal.         Behavior: Behavior normal.                           Assessment & Plan     1. Fall due to slipping on ice or snow, subsequent encounter        2. Contusion of sacrococcygeal region, subsequent encounter        3. Elevated blood " pressure reading          Full Duty trial    3 week recheck, sooner if needed

## 2023-02-10 NOTE — LETTER
"   [facility]  [address] [place] [address]  Phone:  [phone] - Fax:  [phone]   [facility] Progress Report and Disability Certification  Date of Service: 2/10/2023   No Show:  No  Date / Time of Next Visit: 3/3/2023 @ 8:00am   Claim Information   Patient Name: Shona Garcia  Claim Number:     Employer: [employer]  Date of Injury: 1/17/2023     Insurer / TPA: [employer]  ID / SSN:     Occupation: [employer]  Diagnosis: Diagnoses of Fall due to slipping on ice or snow, subsequent encounter, Contusion of sacrococcygeal region, subsequent encounter, and Elevated blood pressure reading were pertinent to this visit.    Medical Information   Related to Industrial Injury? Yes    Subjective Complaints:  \" DOI 1/17/21 Employer: [employer] Job title . his is a pleasant 62-year-old female who slipped and fell on ice on her way in from the parking area while walking on a walkway.  She was walking into start working the day.  She reports she fell onto curb with buttock.  No loss of consciousness.  She experienced acute pain in the tailbone area.  She crawled and got up.  Pain has persisted since then.  She believes that it would have gone away by now.  Pain is aggravated with sitting and lying down.  Tried tylenol.  Does not tolerate ibuprofen due to GI side effects.  Pain is located at tailbone.  No leg pain, n/t, or weakness.  No bowel or bladder dysfunction.  Tried a donut type pillow and ice. \"    * Today 2/10/2023 feels about 90% better, mild ache now to lower back sacrum region    Objective Findings:     Pre-Existing Condition(s):     Assessment:   Condition Improved    Status: Additional Care Required  Permanent Disability:No    Plan:      Diagnostics:      Comments:       Disability Information   Status: Released to Full Duty    From:  2/10/2023  Through: 3/2/2023 Restrictions are:     Physical Restrictions   Sitting:    " Standing:    Stooping:    Bending:      Squatting:    Walking:    Climbing:    Pushing:      Pulling:    Other:    Reaching Above Shoulder (L):   Reaching Above Shoulder (R):       Reaching Below Shoulder (L):    Reaching Below Shoulder (R):      Not to exceed Weight Limits   Carrying(hrs):   Weight Limit(lb):   Lifting(hrs):   Weight  Limit(lb):     Comments:      Repetitive Actions   Hands: i.e. Fine Manipulations from Grasping:     Feet: i.e. Operating Foot Controls:     Driving / Operate Machinery:     Health Care Provider’s Original or Electronic Signature  Huber Hopson M.D. Health Care Provider’s Original or Electronic Signature    Filiberto Ruggiero DO MPH     Clinic Name / Location: [place] [address]  [place] [address] Clinic Phone Number: Dept: [phone]   Appointment Time: 9:15 Am Visit Start Time: 8:42 AM   Check-In Time:  8:31 Am Visit Discharge Time:     Original-Treating Physician or Chiropractor    Page 2-Insurer/TPA    Page 3-Employer    Page 4-Employee

## 2023-03-03 ENCOUNTER — OCCUPATIONAL MEDICINE (OUTPATIENT)
Dept: OCCUPATIONAL MEDICINE | Facility: CLINIC | Age: 63
End: 2023-03-03
Payer: COMMERCIAL

## 2023-03-03 VITALS
HEART RATE: 80 BPM | OXYGEN SATURATION: 97 % | DIASTOLIC BLOOD PRESSURE: 72 MMHG | RESPIRATION RATE: 14 BRPM | BODY MASS INDEX: 23.92 KG/M2 | TEMPERATURE: 97.6 F | HEIGHT: 63 IN | SYSTOLIC BLOOD PRESSURE: 118 MMHG | WEIGHT: 135 LBS

## 2023-03-03 DIAGNOSIS — S30.0XXD CONTUSION OF SACRUM, SUBSEQUENT ENCOUNTER: ICD-10-CM

## 2023-03-03 DIAGNOSIS — M53.3 COCCYGEAL PAIN, ACUTE: ICD-10-CM

## 2023-03-03 PROCEDURE — 99202 OFFICE O/P NEW SF 15 MIN: CPT | Performed by: PREVENTIVE MEDICINE

## 2023-03-03 RX ORDER — CELECOXIB 200 MG/1
200 CAPSULE ORAL
Qty: 30 CAPSULE | Refills: 0 | Status: SHIPPED | OUTPATIENT
Start: 2023-03-03 | End: 2023-06-30

## 2023-03-03 ASSESSMENT — FIBROSIS 4 INDEX: FIB4 SCORE: 0.64

## 2023-03-03 NOTE — PROGRESS NOTES
"Subjective:     Shona Garcia is a 62 y.o. female who presents for Follow-Up (Better -  16/)      DOI 1/17/21 Employer: [employer] title . his is a pleasant 62-year-old female who slipped and fell on ice on her way in from the parking area while walking on a walkway.  She was walking into start working the day.  She reports she fell onto curb with buttock.  Patient states that overall she feels over 90% improved.  She states pain is minimal at this point.  However she does occasionally have some flareups for which she uses medication.  Working full duty.  Feels okay being released and coming back as needed.    ROS: All systems were reviewed on intake form, form was reviewed and signed. See scanned documents in media. Pertinent positives and negatives included in HPI.    PMH: No pertinent past medical history to this problem  MEDS: Medications were reviewed in Epic  ALLERGIES:   Allergies   Allergen Reactions    Metformin      vertigo    Pcn [Penicillins]      SOCHX: Works as an  at [employer]  FH: No pertinent family history to this problem       Objective:     /72   Pulse 80   Temp 36.4 °C (97.6 °F) (Temporal)   Resp 14   Ht 1.6 m (5' 3\")   Wt 61.2 kg (135 lb)   LMP 08/20/2011   SpO2 97%   BMI 23.91 kg/m²     Constitutional: Patient is in no acute distress. Appears well-developed and well-nourished.   HENT: Normocephalic and atraumatic. EOM are normal. No scleral icterus.   Cardiovascular: Normal rate.    Pulmonary/Chest: Effort normal. No respiratory distress.   Neurological: Patient is alert and oriented to person, place, and time.   Skin: Skin is warm and dry.   Psychiatric: Normal mood and affect. Behavior is normal.     Lumbosacral: No gross deformity.  Area of tenderness over the inferior sacrum.  Normal gait.    Assessment/Plan:       1. Contusion of sacrum, subsequent encounter    2. Coccygeal pain, acute  - celecoxib (CELEBREX) 200 MG Cap; Take 1 " Capsule by mouth 1 time a day as needed for Moderate Pain.  Dispense: 30 Capsule; Refill: 0    Released to Full Duty FROM 3/3/2023 TO       Released from care  Full duty  Expect gradual resolution of remaining symptoms.  Follow-up as needed    Differential diagnosis, natural history, supportive care, and indications for immediate follow-up discussed.    Approximately 25 minutes were spent in reviewing notes, preparing for visit, obtaining history, exam and evaluation, patient counseling/education and post visit documentation/orders.

## 2023-03-03 NOTE — LETTER
[address],   [address] [place] [address]  Phone:  [phone] - Fax:  [phone]   Occupational Health [facility] Progress Report and Disability Certification  Date of Service: 3/3/2023   No Show:  No  Date / Time of Next Visit:  Release from care   Claim Information   Patient Name: Shona Garcia  Claim Number:     Employer: [employer]  Date of Injury: 1/17/2023     Insurer / TPA: [employer]  ID / SSN:     Occupation: [employer]  Diagnosis: Diagnoses of Contusion of sacrum, subsequent encounter and Coccygeal pain, acute were pertinent to this visit.    Medical Information   Related to Industrial Injury? Yes    Subjective Complaints:  DOI 1/17/21 Employer: [employer] Job title . his is a pleasant 62-year-old female who slipped and fell on ice on her way in from the parking area while walking on a walkway.  She was walking into start working the day.  She reports she fell onto curb with buttock.  Patient states that overall she feels over 90% improved.  She states pain is minimal at this point.  However she does occasionally have some flareups for which she uses medication.  Working full duty.  Feels okay being released and coming back as needed.   Objective Findings: Lumbosacral: No gross deformity.  Area of tenderness over the inferior sacrum.  Normal gait.   Pre-Existing Condition(s):     Assessment:   Condition Improved    Status: Discharged /  MMI  Permanent Disability:No    Plan:      Diagnostics:      Comments:  Released from care  Full duty  Expect gradual resolution of remaining symptoms.  Follow-up as needed    Disability Information   Status: Released to Full Duty    From:  3/3/2023  Through:   Restrictions are:     Physical Restrictions   Sitting:    Standing:    Stooping:    Bending:      Squatting:    Walking:    Climbing:    Pushing:      Pulling:    Other:    Reaching Above Shoulder (L):   Reaching Above Shoulder (R):        Reaching Below Shoulder (L):    Reaching Below Shoulder (R):      Not to exceed Weight Limits   Carrying(hrs):   Weight Limit(lb):   Lifting(hrs):   Weight  Limit(lb):     Comments:      Repetitive Actions   Hands: i.e. Fine Manipulations from Grasping:     Feet: i.e. Operating Foot Controls:     Driving / Operate Machinery:     Health Care Provider’s Original or Electronic Signature  Filiberto Ruggiero D.O. Health Care Provider’s Original or Electronic Signature    Filiberto Ruggiero DO MPH     Clinic Name / Location: [address],   [address] [address] Clinic Phone Number: Dept: [phone]   Appointment Time: 8:00 Am Visit Start Time: 8:00 AM   Check-In Time:  7:55 Am Visit Discharge Time:  8:18AM   Original-Treating Physician or Chiropractor    Page 2-Insurer/TPA    Page 3-Employer    Page 4-Employee

## 2023-03-20 ENCOUNTER — OFFICE VISIT (OUTPATIENT)
Dept: URGENT CARE | Facility: CLINIC | Age: 63
End: 2023-03-20
Payer: COMMERCIAL

## 2023-03-20 VITALS
SYSTOLIC BLOOD PRESSURE: 132 MMHG | OXYGEN SATURATION: 98 % | HEART RATE: 77 BPM | WEIGHT: 137 LBS | RESPIRATION RATE: 16 BRPM | BODY MASS INDEX: 24.27 KG/M2 | TEMPERATURE: 97.6 F | DIASTOLIC BLOOD PRESSURE: 70 MMHG | HEIGHT: 63 IN

## 2023-03-20 DIAGNOSIS — J06.9 UPPER RESPIRATORY TRACT INFECTION, UNSPECIFIED TYPE: ICD-10-CM

## 2023-03-20 DIAGNOSIS — J02.9 SORE THROAT: ICD-10-CM

## 2023-03-20 LAB
FLUAV RNA SPEC QL NAA+PROBE: NEGATIVE
FLUBV RNA SPEC QL NAA+PROBE: NEGATIVE
RSV RNA SPEC QL NAA+PROBE: NEGATIVE
S PYO DNA SPEC NAA+PROBE: NOT DETECTED
SARS-COV-2 RNA RESP QL NAA+PROBE: NEGATIVE

## 2023-03-20 PROCEDURE — 0241U POCT CEPHEID COV-2, FLU A/B, RSV - PCR: CPT | Performed by: NURSE PRACTITIONER

## 2023-03-20 PROCEDURE — 99213 OFFICE O/P EST LOW 20 MIN: CPT | Performed by: NURSE PRACTITIONER

## 2023-03-20 PROCEDURE — 87651 STREP A DNA AMP PROBE: CPT | Performed by: NURSE PRACTITIONER

## 2023-03-20 RX ORDER — LIDOCAINE HYDROCHLORIDE 20 MG/ML
15 SOLUTION OROPHARYNGEAL
Qty: 100 ML | Refills: 0 | Status: SHIPPED | OUTPATIENT
Start: 2023-03-20 | End: 2023-06-30

## 2023-03-20 ASSESSMENT — VISUAL ACUITY: OU: 1

## 2023-03-20 ASSESSMENT — ENCOUNTER SYMPTOMS
RESPIRATORY NEGATIVE: 1
SORE THROAT: 1
CONSTITUTIONAL NEGATIVE: 1
NECK PAIN: 1
CARDIOVASCULAR NEGATIVE: 1
FEVER: 0

## 2023-03-20 ASSESSMENT — FIBROSIS 4 INDEX: FIB4 SCORE: 0.64

## 2023-03-21 NOTE — PROGRESS NOTES
Subjective:     Shona Garcia is a 62 y.o. female who presents for Sore Throat (X 1 wk )       Pharyngitis   This is a new problem. The problem has been gradually worsening. Associated symptoms include congestion, ear pain and neck pain. She has tried NSAIDs and acetaminophen for the symptoms. The treatment provided mild relief.     1 week ago, patient started to develop a sore throat.  Reports left-sided neck pain.  Reports left ear pain.  Had a runny nose custodial through her illness.  Home COVID test negative.    Review of Systems   Constitutional: Negative.  Negative for fever and malaise/fatigue.   HENT:  Positive for congestion, ear pain and sore throat.    Respiratory: Negative.     Cardiovascular: Negative.    Musculoskeletal:  Positive for neck pain.   All other systems reviewed and are negative.    Refer to hospitals for additional details.    During this visit, appropriate PPE was worn, hand hygiene was performed, and the patient and any visitors were masked.    PMH:  has a past medical history of Allergy, Anemia, Blood transfusion (1986), Diabetes mellitus type II (4/18/2011), Dyslipidemia (8/28/2013), GERD (gastroesophageal reflux disease), Headache(784.0), Heart murmur, Measles, Pyelonephritis (2002), S/P colonoscopy (2010), Thyroid disease, Uterine fibroid (1986), Uterine fibroid, and Varicella.    She has no past medical history of Addisons disease ([facility]), Adrenal disorder ([facility]), Anxiety, Arrhythmia, ASTHMA, CATARACT, CHF (congestive heart failure) ([facility]), Clotting disorder ([facility]), COPD, Cushings syndrome ([facility]), Depression, EMPHYSEMA, Glaucoma, Goiter, Heart attack ([facility]), Hypertension, IBD (inflammatory bowel disease), Meningitis, Migraine, Muscle disorder, OSTEOPOROSIS, Parathyroid disorder ([facility]), Pituitary disease ([facility]), Seizure ([facility]), Stroke ([facility]), Substance abuse ([facility]), Tuberculosis, Ulcer, or Urinary tract infection, site not specified.    MEDS:   Current Outpatient Medications:      "lidocaine (XYLOCAINE) 2 % Solution, Take 15 mL by mouth every 3 hours as needed for Throat/Mouth Pain. Swish/gargle well, then spit out, Disp: 100 mL, Rfl: 0    celecoxib (CELEBREX) 200 MG Cap, Take 1 Capsule by mouth 1 time a day as needed for Moderate Pain., Disp: 30 Capsule, Rfl: 0    glimepiride (AMARYL) 4 MG Tab, Take 1 Tablet by mouth every morning., Disp: 90 Tablet, Rfl: 1    Empagliflozin 10 MG Tab, Take 1 Tablet by mouth every day. Prior to first meal of the day, Disp: 90 Tablet, Rfl: 1    levothyroxine (SYNTHROID) 75 MCG Tab, Take 1 Tablet by mouth every morning on an empty stomach., Disp: 90 Tablet, Rfl: 3    azelastine (ASTELIN) 137 MCG/SPRAY nasal spray, Administer 2 Sprays into affected nostril(S) 2 times a day., Disp: 30 mL, Rfl: 5    Omega-3 Fatty Acids (OMEGA 3 PO), Take  by mouth., Disp: , Rfl:     vitamin D (CHOLECALCIFEROL) 1000 UNIT TABS, Take 2 Tablets by mouth every day., Disp: , Rfl:     Lidocaine 4 % Patch, Apply patch to painful area. Patch may remain in place for up to 12 hours in any 24-hour period. No more than 1 patch can be used in a 24-hour period., Disp: 15 Patch, Rfl: 0    Diclofenac Sodium 1 % Gel, Apply to 2-4 gram to affected area 4 times daily, Disp: 100 g, Rfl: 1    ALLERGIES:   Allergies   Allergen Reactions    Metformin      vertigo    Pcn [Penicillins]      SURGHX:   Past Surgical History:   Procedure Laterality Date    LUNG BIOPSY OPEN  1995    Parasite    TUBE & ECTOPIC PREG., REMOVAL  1989    PRIMARY C SECTION       SOCHX:  reports that she has never smoked. She has never used smokeless tobacco. She reports that she does not drink alcohol and does not use drugs.    FH: Per HPI as applicable/pertinent.      Objective:     /70   Pulse 77   Temp 36.4 °C (97.6 °F)   Resp 16   Ht 1.6 m (5' 3\")   Wt 62.1 kg (137 lb)   LMP 08/20/2011   SpO2 98%   BMI 24.27 kg/m²     Physical Exam  Nursing note reviewed.   Constitutional:       General: She is not in acute " distress.     Appearance: She is well-developed. She is not ill-appearing or toxic-appearing.   HENT:      Right Ear: Tympanic membrane normal.      Left Ear: Tympanic membrane normal.      Nose: Nose normal.      Mouth/Throat:      Mouth: Mucous membranes are moist.      Pharynx: Oropharynx is clear. Uvula midline. No pharyngeal swelling, oropharyngeal exudate or posterior oropharyngeal erythema.   Eyes:      General: Vision grossly intact.   Cardiovascular:      Rate and Rhythm: Normal rate.   Pulmonary:      Effort: Pulmonary effort is normal. No respiratory distress.      Comments: Phonation normal, speaks in full sentences, no audible wheeze or stridor   Musculoskeletal:         General: No deformity. Normal range of motion.      Cervical back: Neck supple.   Lymphadenopathy:      Cervical: No cervical adenopathy.   Skin:     General: Skin is warm and dry.      Coloration: Skin is not pale.   Neurological:      Mental Status: She is alert and oriented to person, place, and time.      Motor: No weakness.   Psychiatric:         Behavior: Behavior normal. Behavior is cooperative.       Assessment/Plan:     1. Upper respiratory tract infection, unspecified type  - POCT CoV-2, Flu A/B, RSV by PCR  - POCT GROUP A STREP, PCR    2. Sore throat  - lidocaine (XYLOCAINE) 2 % Solution; Take 15 mL by mouth every 3 hours as needed for Throat/Mouth Pain. Swish/gargle well, then spit out  Dispense: 100 mL; Refill: 0    Discussed likely self-limiting viral etiology and expected course and duration of illness. Vital signs stable, afebrile, no acute distress at this time. Rx as above sent electronically.     Differential diagnosis, natural history, supportive care, rest, fluids, gargles, over-the-counter symptom management per 's instructions, ibuprofen, APAP, close monitoring, and indications for immediate follow-up discussed.     Swabs pending. Will call patient and treat appropriately.    Warning signs reviewed.  Return precautions discussed.     All questions answered. Patient agrees with the plan of care.    Discharge summary provided.

## 2023-03-28 ENCOUNTER — HOSPITAL ENCOUNTER (OUTPATIENT)
Dept: LAB | Facility: MEDICAL CENTER | Age: 63
End: 2023-03-28
Attending: FAMILY MEDICINE
Payer: COMMERCIAL

## 2023-03-28 DIAGNOSIS — E11.9 TYPE 2 DIABETES MELLITUS WITHOUT COMPLICATION, WITHOUT LONG-TERM CURRENT USE OF INSULIN (HCC): ICD-10-CM

## 2023-03-28 DIAGNOSIS — E78.1 PURE HYPERTRIGLYCERIDEMIA: ICD-10-CM

## 2023-03-28 DIAGNOSIS — E03.9 ACQUIRED HYPOTHYROIDISM: ICD-10-CM

## 2023-03-28 LAB
ALBUMIN SERPL BCP-MCNC: 4.1 G/DL (ref 3.2–4.9)
ALBUMIN/GLOB SERPL: 1.3 G/DL
ALP SERPL-CCNC: 84 U/L (ref 30–99)
ALT SERPL-CCNC: 15 U/L (ref 2–50)
ANION GAP SERPL CALC-SCNC: 11 MMOL/L (ref 7–16)
AST SERPL-CCNC: 15 U/L (ref 12–45)
BASOPHILS # BLD AUTO: 0.6 % (ref 0–1.8)
BASOPHILS # BLD: 0.05 K/UL (ref 0–0.12)
BILIRUB SERPL-MCNC: 0.3 MG/DL (ref 0.1–1.5)
BUN SERPL-MCNC: 12 MG/DL (ref 8–22)
CALCIUM ALBUM COR SERPL-MCNC: 9 MG/DL (ref 8.5–10.5)
CALCIUM SERPL-MCNC: 9.1 MG/DL (ref 8.4–10.2)
CHLORIDE SERPL-SCNC: 99 MMOL/L (ref 96–112)
CHOLEST SERPL-MCNC: 162 MG/DL (ref 100–199)
CO2 SERPL-SCNC: 24 MMOL/L (ref 20–33)
CREAT SERPL-MCNC: 0.54 MG/DL (ref 0.5–1.4)
EOSINOPHIL # BLD AUTO: 0.09 K/UL (ref 0–0.51)
EOSINOPHIL NFR BLD: 1 % (ref 0–6.9)
ERYTHROCYTE [DISTWIDTH] IN BLOOD BY AUTOMATED COUNT: 39.8 FL (ref 35.9–50)
EST. AVERAGE GLUCOSE BLD GHB EST-MCNC: 174 MG/DL
FASTING STATUS PATIENT QL REPORTED: NORMAL
GFR SERPLBLD CREATININE-BSD FMLA CKD-EPI: 103 ML/MIN/1.73 M 2
GLOBULIN SER CALC-MCNC: 3.1 G/DL (ref 1.9–3.5)
GLUCOSE SERPL-MCNC: 135 MG/DL (ref 65–99)
HBA1C MFR BLD: 7.7 % (ref 4–5.6)
HCT VFR BLD AUTO: 40.1 % (ref 37–47)
HDLC SERPL-MCNC: 46 MG/DL
HGB BLD-MCNC: 13.4 G/DL (ref 12–16)
IMM GRANULOCYTES # BLD AUTO: 0.04 K/UL (ref 0–0.11)
IMM GRANULOCYTES NFR BLD AUTO: 0.5 % (ref 0–0.9)
LDLC SERPL CALC-MCNC: 87 MG/DL
LYMPHOCYTES # BLD AUTO: 2.41 K/UL (ref 1–4.8)
LYMPHOCYTES NFR BLD: 27.3 % (ref 22–41)
MCH RBC QN AUTO: 28 PG (ref 27–33)
MCHC RBC AUTO-ENTMCNC: 33.4 G/DL (ref 33.6–35)
MCV RBC AUTO: 83.9 FL (ref 81.4–97.8)
MONOCYTES # BLD AUTO: 0.42 K/UL (ref 0–0.85)
MONOCYTES NFR BLD AUTO: 4.8 % (ref 0–13.4)
NEUTROPHILS # BLD AUTO: 5.82 K/UL (ref 2–7.15)
NEUTROPHILS NFR BLD: 65.8 % (ref 44–72)
NRBC # BLD AUTO: 0 K/UL
NRBC BLD-RTO: 0 /100 WBC
PLATELET # BLD AUTO: 370 K/UL (ref 164–446)
PMV BLD AUTO: 8.7 FL (ref 9–12.9)
POTASSIUM SERPL-SCNC: 4.2 MMOL/L (ref 3.6–5.5)
PROT SERPL-MCNC: 7.2 G/DL (ref 6–8.2)
RBC # BLD AUTO: 4.78 M/UL (ref 4.2–5.4)
SODIUM SERPL-SCNC: 134 MMOL/L (ref 135–145)
TRIGL SERPL-MCNC: 147 MG/DL (ref 0–149)
TSH SERPL DL<=0.005 MIU/L-ACNC: 2.55 UIU/ML (ref 0.38–5.33)
WBC # BLD AUTO: 8.8 K/UL (ref 4.8–10.8)

## 2023-03-28 PROCEDURE — 36415 COLL VENOUS BLD VENIPUNCTURE: CPT

## 2023-03-28 PROCEDURE — 84443 ASSAY THYROID STIM HORMONE: CPT

## 2023-03-28 PROCEDURE — 85025 COMPLETE CBC W/AUTO DIFF WBC: CPT

## 2023-03-28 PROCEDURE — 80053 COMPREHEN METABOLIC PANEL: CPT

## 2023-03-28 PROCEDURE — 83036 HEMOGLOBIN GLYCOSYLATED A1C: CPT

## 2023-03-28 PROCEDURE — 80061 LIPID PANEL: CPT

## 2023-03-30 ENCOUNTER — OFFICE VISIT (OUTPATIENT)
Dept: MEDICAL GROUP | Age: 63
End: 2023-03-30
Payer: COMMERCIAL

## 2023-03-30 VITALS
WEIGHT: 138 LBS | DIASTOLIC BLOOD PRESSURE: 70 MMHG | OXYGEN SATURATION: 98 % | TEMPERATURE: 96.7 F | SYSTOLIC BLOOD PRESSURE: 130 MMHG | BODY MASS INDEX: 24.45 KG/M2 | HEIGHT: 63 IN | HEART RATE: 70 BPM

## 2023-03-30 DIAGNOSIS — M19.032 LOCALIZED PRIMARY OSTEOARTHRITIS OF CARPOMETACARPAL (CMC) JOINT OF LEFT WRIST: ICD-10-CM

## 2023-03-30 DIAGNOSIS — M25.562 CHRONIC PAIN OF BOTH KNEES: ICD-10-CM

## 2023-03-30 DIAGNOSIS — G89.29 CHRONIC PAIN OF BOTH KNEES: ICD-10-CM

## 2023-03-30 DIAGNOSIS — Z00.00 PE (PHYSICAL EXAM), ANNUAL: Primary | ICD-10-CM

## 2023-03-30 DIAGNOSIS — E78.1 PURE HYPERTRIGLYCERIDEMIA: ICD-10-CM

## 2023-03-30 DIAGNOSIS — E11.9 TYPE 2 DIABETES MELLITUS WITHOUT COMPLICATION, WITHOUT LONG-TERM CURRENT USE OF INSULIN (HCC): ICD-10-CM

## 2023-03-30 DIAGNOSIS — J30.1 NON-SEASONAL ALLERGIC RHINITIS DUE TO POLLEN: ICD-10-CM

## 2023-03-30 DIAGNOSIS — M25.561 CHRONIC PAIN OF BOTH KNEES: ICD-10-CM

## 2023-03-30 DIAGNOSIS — E03.9 ACQUIRED HYPOTHYROIDISM: ICD-10-CM

## 2023-03-30 DIAGNOSIS — Z53.20 STATIN DECLINED: ICD-10-CM

## 2023-03-30 PROCEDURE — 99214 OFFICE O/P EST MOD 30 MIN: CPT | Mod: 25 | Performed by: FAMILY MEDICINE

## 2023-03-30 PROCEDURE — 99396 PREV VISIT EST AGE 40-64: CPT | Performed by: FAMILY MEDICINE

## 2023-03-30 RX ORDER — GLIMEPIRIDE 4 MG/1
4 TABLET ORAL EVERY MORNING
Qty: 90 TABLET | Refills: 1 | Status: SHIPPED | OUTPATIENT
Start: 2023-03-30 | End: 2023-06-30 | Stop reason: SDUPTHER

## 2023-03-30 RX ORDER — LEVOTHYROXINE SODIUM 0.07 MG/1
75 TABLET ORAL
Qty: 90 TABLET | Refills: 3 | Status: SHIPPED | OUTPATIENT
Start: 2023-03-30

## 2023-03-30 RX ORDER — FEXOFENADINE HCL 180 MG/1
180 TABLET ORAL DAILY
Qty: 90 TABLET | Refills: 3 | Status: SHIPPED | OUTPATIENT
Start: 2023-03-30 | End: 2023-06-30

## 2023-03-30 RX ORDER — GLUCOSAMINE HCL/CHONDROITIN SU 500-400 MG
CAPSULE ORAL
Qty: 100 STRIP | Refills: 11 | Status: SHIPPED | OUTPATIENT
Start: 2023-03-30 | End: 2023-06-30

## 2023-03-30 ASSESSMENT — PATIENT HEALTH QUESTIONNAIRE - PHQ9: CLINICAL INTERPRETATION OF PHQ2 SCORE: 0

## 2023-03-30 ASSESSMENT — FIBROSIS 4 INDEX: FIB4 SCORE: 0.66

## 2023-03-30 NOTE — PROGRESS NOTES
Subjective:   CC: aPE, diabetes, tender neck mass    HPI:     Shona Garcia is a 62 y.o. female, established patient of the clinic.     Patient has chronic type 2 diabetes, hypothyroidism, hyperlipidemia, chronic bilateral knee pain secondary to osteoarthritis.  Patient is taking all medications as directed.  However, patient has not been compliant to diabetic diet.  Her A1c increases from 7.5 to 7.7 per recent labs.  Patient is active and try to exercise regularly.    Patient has the following acute concerns:  Patient has been suffering left first CMC joint pain for a number of months.  Patient is wearing immobilizing brace for pain control.  However, every time she uses the left hand, this joint is bothering her. No history of prior surgery or injury to the affected joint.    Patient also complains of tender submandibular mass on the left.  Patient went to urgent care and was prescribed oral solution to help with throat discomfort.  However, her symptoms failed to improve.  She also disliked the taste of the oral solution.       Current medicines (including changes today)  Current Outpatient Medications   Medication Sig Dispense Refill    Empagliflozin (JARDIANCE) 10 MG Tab tablet Take 1 Tablet by mouth every day. Prior to first meal of the day 90 Tablet 1    glimepiride (AMARYL) 4 MG Tab Take 1 Tablet by mouth every morning. 90 Tablet 1    levothyroxine (SYNTHROID) 75 MCG Tab Take 1 Tablet by mouth every morning on an empty stomach. 90 Tablet 3    Blood Glucose Monitoring Suppl Device Meter: Dispense Device of Insurance Preference. Sig. Use as directed for blood sugar monitoring. #1. NR. 1 Each 0    Glucose Blood (BLOOD GLUCOSE TEST STRIPS) Strip Test your blood sugar 1 daily and PRN for symptoms of high or low blood sugar 100 Strip 11    fexofenadine (ALLEGRA) 180 MG tablet Take 1 Tablet by mouth every day. 90 Tablet 3    lidocaine (XYLOCAINE) 2 % Solution Take 15 mL by mouth every 3 hours as  needed for Throat/Mouth Pain. Swish/gargle well, then spit out 100 mL 0    celecoxib (CELEBREX) 200 MG Cap Take 1 Capsule by mouth 1 time a day as needed for Moderate Pain. 30 Capsule 0    Lidocaine 4 % Patch Apply patch to painful area. Patch may remain in place for up to 12 hours in any 24-hour period. No more than 1 patch can be used in a 24-hour period. 15 Patch 0    Diclofenac Sodium 1 % Gel Apply to 2-4 gram to affected area 4 times daily 100 g 1    Omega-3 Fatty Acids (OMEGA 3 PO) Take  by mouth.      vitamin D (CHOLECALCIFEROL) 1000 UNIT TABS Take 2 Tablets by mouth every day.      azelastine (ASTELIN) 137 MCG/SPRAY nasal spray Administer 2 Sprays into affected nostril(S) 2 times a day. 30 mL 5     No current facility-administered medications for this visit.     She  has a past medical history of Allergy, Anemia, Blood transfusion (1986), Diabetes mellitus type II (4/18/2011), Dyslipidemia (8/28/2013), GERD (gastroesophageal reflux disease), Headache(784.0), Heart murmur, Measles, Pyelonephritis (2002), S/P colonoscopy (2010), Thyroid disease, Uterine fibroid (1986), Uterine fibroid, and Varicella.    She has no past medical history of Addisons disease ([facility]), Adrenal disorder ([facility]), Anxiety, Arrhythmia, ASTHMA, CATARACT, CHF (congestive heart failure) ([facility]), Clotting disorder ([facility]), COPD, Cushings syndrome ([facility]), Depression, EMPHYSEMA, Glaucoma, Goiter, Heart attack ([facility]), Hypertension, IBD (inflammatory bowel disease), Meningitis, Migraine, Muscle disorder, OSTEOPOROSIS, Parathyroid disorder ([facility]), Pituitary disease ([facility]), Seizure ([facility]), Stroke ([facility]), Substance abuse ([facility]), Tuberculosis, Ulcer, or Urinary tract infection, site not specified.    I reviewed patient's problem list, allergies, medications, family hx, social hx with patient and update EPIC.        Objective:     /70 (BP Location: Left arm, Patient Position: Sitting, BP Cuff Size: Adult)   Pulse 70   Temp 35.9 °C (96.7 °F) (Temporal)    "Ht 1.6 m (5' 3\")   Wt 62.6 kg (138 lb)   SpO2 98%  Body mass index is 24.45 kg/m².    Physical Exam:  Constitutional: awake, alert, in no distress.  Skin: Warm, dry, good turgor, no rashes, bruises, ulcers in visible areas.  Eye: conjunctiva clear, lids neg for edema or lesions.  ENMT: TM and auditory canals wnl.  Pale and congested nasal mucosa with inferior nasal turbinate hypertrophy. Lips without lesions, good dentition, hyperemic oropharynx, tonsils are not well visualized.  Neck: Trachea midline, no masses, no thyromegaly. No cervical or supraclavicular lymphadenopathy.  -Mildly tender submandibular lymphadenopathy bilaterally, left worse than right  Respiratory: Unlabored respiratory effort, lungs clear to auscultation, no wheezes, no rales.  Cardiovascular: Normal S1, S2, grade 2/6 systolic ejection murmur best heard at the right upper sternum, no pedal edema.  Psych: Oriented x3, affect and mood wnl, intact judgement and insight.   MSK: tender, enlarged first left CMC joint w/o edema, effusion, or erythema    Monofilament testing with a 10 gram force: sensation intact: intact bilaterally  Visual Inspection: Feet without maceration, ulcers, fissures.  Pedal pulses: intact bilaterally     Assessment and Plan:   The following treatment plan was discussed    1. Type 2 diabetes mellitus without complication, without long-term current use of insulin (HCC)  Chronic, not controlled, A1c increases from 7.5 a few months ago to 7.7 per recent labs. Patient is noncompliant to diabetic diet.  She is active and tries to exercise regularly. She is compliant to pharmacotherapy.   Patient is not interested in escalation of pharmacotherapy.  She will try to adhere strictly to diabetic diet to control her blood sugar.  - Diabetic Monofilament Lower Extremity Exam  - HEMOGLOBIN A1C; Future  -Continue empagliflozin (JARDIANCE) 10 MG Tab tablet; Take 1 Tablet by mouth every day. Prior to first meal of the day  Dispense: 90 " Tablet; Refill: 1  -Continue glimepiride (AMARYL) 4 MG Tab; Take 1 Tablet by mouth every morning.  Dispense: 90 Tablet; Refill: 1  - Blood Glucose Monitoring Suppl Device; Meter: Dispense Device of Insurance Preference. Sig. Use as directed for blood sugar monitoring. #1. NR.  Dispense: 1 Each; Refill: 0  - Glucose Blood (BLOOD GLUCOSE TEST STRIPS) Strip; Test your blood sugar 1 daily and PRN for symptoms of high or low blood sugar  Dispense: 100 Strip; Refill: 11  - dietary modification, exercise, weight loss  - Annual eye exam  - Regular foot exam  - Discussed prevention and management of hypoglycemia  - Side effects of all medications discussed with patient  - Follow up in 3 months.     2. Acquired hypothyroidism  Chronic, controlled with levothyroxine 75 mcg daily, no s/e reported, will continue.    - levothyroxine (SYNTHROID) 75 MCG Tab; Take 1 Tablet by mouth every morning on an empty stomach.  Dispense: 90 Tablet; Refill: 3    3. Pure hypertriglyceridemia  4. Statin declined  Chronic, diet controlled, declines statin despite known history of type 2 diabetes.  Appropriate counseling previously provided.  We will continue to monitor.    5. Chronic pain of both knees  Chronic, secondary to osteoarthritis, stable, controlled with behavioral modification and Celebrex as needed.    6. PE (physical exam), annual  Labs per orders  Immunization per orders   Patient was counseled about skin care, diet, supplements, exercises.   Preventive cares reviewed, discussed, and updated as appropriate.       7. Localized primary osteoarthritis of carpometacarpal (CMC) joint of left wrist  History and exam are concerning for osteoarthritis of the left first CMC joint.  Symptoms are not controlled with conservative management.  - referred to hand surgeon for consultation.    8. Non-seasonal allergic rhinitis due to pollen  History and exam are concerning for chronic allergic rhinitis   - fexofenadine (ALLEGRA) 180 MG tablet; Take  1 Tablet by mouth every day.  Dispense: 90 Tablet; Refill: 3  - continue nasal steroid.  - Nasal saline irrigation  - follow up LAILAN       Jasmin Hall M.D.      Followup: Return in about 3 months (around 6/30/2023) for Diabetes.    Please note that this dictation was created using voice recognition software. I have made every reasonable attempt to correct obvious errors, but I expect that there are errors of grammar and possibly content that I did not discover before finalizing the note.

## 2023-03-30 NOTE — PATIENT INSTRUCTIONS
[facility]  [address]  [place] [address]  Phone: [phone]

## 2023-05-25 ENCOUNTER — PATIENT MESSAGE (OUTPATIENT)
Dept: MEDICAL GROUP | Age: 63
End: 2023-05-25
Payer: COMMERCIAL

## 2023-06-30 ENCOUNTER — OFFICE VISIT (OUTPATIENT)
Dept: MEDICAL GROUP | Age: 63
End: 2023-06-30
Payer: COMMERCIAL

## 2023-06-30 ENCOUNTER — HOSPITAL ENCOUNTER (OUTPATIENT)
Facility: MEDICAL CENTER | Age: 63
End: 2023-06-30
Attending: FAMILY MEDICINE
Payer: COMMERCIAL

## 2023-06-30 VITALS
OXYGEN SATURATION: 99 % | BODY MASS INDEX: 24.45 KG/M2 | WEIGHT: 138 LBS | HEART RATE: 87 BPM | HEIGHT: 63 IN | DIASTOLIC BLOOD PRESSURE: 70 MMHG | SYSTOLIC BLOOD PRESSURE: 130 MMHG | TEMPERATURE: 97.8 F

## 2023-06-30 DIAGNOSIS — E11.9 TYPE 2 DIABETES MELLITUS WITHOUT COMPLICATION, WITHOUT LONG-TERM CURRENT USE OF INSULIN (HCC): ICD-10-CM

## 2023-06-30 DIAGNOSIS — Z53.20 STATIN DECLINED: ICD-10-CM

## 2023-06-30 DIAGNOSIS — E03.9 ACQUIRED HYPOTHYROIDISM: ICD-10-CM

## 2023-06-30 DIAGNOSIS — E78.1 PURE HYPERTRIGLYCERIDEMIA: ICD-10-CM

## 2023-06-30 PROBLEM — M17.0 PRIMARY OSTEOARTHRITIS OF BOTH KNEES: Status: ACTIVE | Noted: 2020-02-04

## 2023-06-30 PROBLEM — M17.11 PRIMARY OSTEOARTHRITIS OF RIGHT KNEE: Status: RESOLVED | Noted: 2023-06-30 | Resolved: 2023-06-30

## 2023-06-30 PROBLEM — M17.11 PRIMARY OSTEOARTHRITIS OF RIGHT KNEE: Status: ACTIVE | Noted: 2023-06-30

## 2023-06-30 PROCEDURE — 83036 HEMOGLOBIN GLYCOSYLATED A1C: CPT | Performed by: FAMILY MEDICINE

## 2023-06-30 PROCEDURE — 82043 UR ALBUMIN QUANTITATIVE: CPT

## 2023-06-30 PROCEDURE — 3075F SYST BP GE 130 - 139MM HG: CPT | Performed by: FAMILY MEDICINE

## 2023-06-30 PROCEDURE — 82570 ASSAY OF URINE CREATININE: CPT

## 2023-06-30 PROCEDURE — 99214 OFFICE O/P EST MOD 30 MIN: CPT | Performed by: FAMILY MEDICINE

## 2023-06-30 PROCEDURE — 3078F DIAST BP <80 MM HG: CPT | Performed by: FAMILY MEDICINE

## 2023-06-30 RX ORDER — GLUCOSAMINE HCL/CHONDROITIN SU 500-400 MG
CAPSULE ORAL
Qty: 100 STRIP | Refills: 5 | Status: SHIPPED | OUTPATIENT
Start: 2023-06-30 | End: 2024-01-21 | Stop reason: SDUPTHER

## 2023-06-30 RX ORDER — GLIMEPIRIDE 4 MG/1
4 TABLET ORAL EVERY MORNING
Qty: 90 TABLET | Refills: 1 | Status: SHIPPED | OUTPATIENT
Start: 2023-06-30

## 2023-06-30 RX ORDER — SODIUM PHOSPHATE,MONO-DIBASIC 19G-7G/118
500 ENEMA (ML) RECTAL
COMMUNITY

## 2023-06-30 ASSESSMENT — FIBROSIS 4 INDEX: FIB4 SCORE: 0.66

## 2023-07-01 LAB
CREAT UR-MCNC: 9.29 MG/DL
MICROALBUMIN UR-MCNC: <1.2 MG/DL
MICROALBUMIN/CREAT UR: NORMAL MG/G (ref 0–30)

## 2023-07-06 LAB
HBA1C MFR BLD: 7.3 % (ref ?–5.8)
POCT INT CON NEG: NEGATIVE
POCT INT CON POS: POSITIVE

## 2023-07-28 ENCOUNTER — TELEPHONE (OUTPATIENT)
Dept: MEDICAL GROUP | Facility: IMAGING CENTER | Age: 63
End: 2023-07-28
Payer: COMMERCIAL

## 2023-07-28 NOTE — TELEPHONE ENCOUNTER
LVM with patient to schedule a new patient appointment with Dr. Tovar. Patient was advised to call back at their earliest convenience to schedule an appointment.

## 2023-10-15 ENCOUNTER — PHARMACY VISIT (OUTPATIENT)
Dept: PHARMACY | Facility: MEDICAL CENTER | Age: 63
End: 2023-10-15
Payer: COMMERCIAL

## 2023-10-15 PROCEDURE — RXMED WILLOW AMBULATORY MEDICATION CHARGE: Performed by: INTERNAL MEDICINE

## 2023-10-15 RX ORDER — INFLUENZA A VIRUS A/BRISBANE/02/2018 IVR-190 (H1N1) ANTIGEN (FORMALDEHYDE INACTIVATED), INFLUENZA A VIRUS A/KANSAS/14/2017 X-327 (H3N2) ANTIGEN (FORMALDEHYDE INACTIVATED), INFLUENZA B VIRUS B/PHUKET/3073/2013 ANTIGEN (FORMALDEHYDE INACTIVATED), AND INFLUENZA B VIRUS B/MARYLAND/15/2016 BX-69A ANTIGEN (FORMALDEHYDE INACTIVATED) 15; 15; 15; 15 UG/.5ML; UG/.5ML; UG/.5ML; UG/.5ML
INJECTION, SUSPENSION INTRAMUSCULAR
Qty: 0.5 ML | Refills: 0 | OUTPATIENT
Start: 2023-10-12

## 2023-10-19 ENCOUNTER — HOSPITAL ENCOUNTER (OUTPATIENT)
Dept: RADIOLOGY | Facility: MEDICAL CENTER | Age: 63
End: 2023-10-19
Attending: FAMILY MEDICINE
Payer: COMMERCIAL

## 2023-10-19 DIAGNOSIS — Z12.31 VISIT FOR SCREENING MAMMOGRAM: ICD-10-CM

## 2023-10-19 PROCEDURE — 77063 BREAST TOMOSYNTHESIS BI: CPT

## 2023-11-24 SDOH — ECONOMIC STABILITY: FOOD INSECURITY: WITHIN THE PAST 12 MONTHS, THE FOOD YOU BOUGHT JUST DIDN'T LAST AND YOU DIDN'T HAVE MONEY TO GET MORE.: NEVER TRUE

## 2023-11-24 SDOH — HEALTH STABILITY: PHYSICAL HEALTH: ON AVERAGE, HOW MANY MINUTES DO YOU ENGAGE IN EXERCISE AT THIS LEVEL?: 10 MIN

## 2023-11-24 SDOH — ECONOMIC STABILITY: FOOD INSECURITY: WITHIN THE PAST 12 MONTHS, YOU WORRIED THAT YOUR FOOD WOULD RUN OUT BEFORE YOU GOT MONEY TO BUY MORE.: NEVER TRUE

## 2023-11-24 SDOH — HEALTH STABILITY: PHYSICAL HEALTH: ON AVERAGE, HOW MANY DAYS PER WEEK DO YOU ENGAGE IN MODERATE TO STRENUOUS EXERCISE (LIKE A BRISK WALK)?: 4 DAYS

## 2023-11-24 SDOH — ECONOMIC STABILITY: INCOME INSECURITY: IN THE LAST 12 MONTHS, WAS THERE A TIME WHEN YOU WERE NOT ABLE TO PAY THE MORTGAGE OR RENT ON TIME?: NO

## 2023-11-24 SDOH — HEALTH STABILITY: MENTAL HEALTH
STRESS IS WHEN SOMEONE FEELS TENSE, NERVOUS, ANXIOUS, OR CAN'T SLEEP AT NIGHT BECAUSE THEIR MIND IS TROUBLED. HOW STRESSED ARE YOU?: TO SOME EXTENT

## 2023-11-24 SDOH — ECONOMIC STABILITY: INCOME INSECURITY: HOW HARD IS IT FOR YOU TO PAY FOR THE VERY BASICS LIKE FOOD, HOUSING, MEDICAL CARE, AND HEATING?: NOT VERY HARD

## 2023-11-24 SDOH — ECONOMIC STABILITY: TRANSPORTATION INSECURITY
IN THE PAST 12 MONTHS, HAS LACK OF TRANSPORTATION KEPT YOU FROM MEETINGS, WORK, OR FROM GETTING THINGS NEEDED FOR DAILY LIVING?: NO

## 2023-11-24 SDOH — ECONOMIC STABILITY: HOUSING INSECURITY

## 2023-11-24 ASSESSMENT — SOCIAL DETERMINANTS OF HEALTH (SDOH)
HOW OFTEN DO YOU HAVE A DRINK CONTAINING ALCOHOL: NEVER
IN A TYPICAL WEEK, HOW MANY TIMES DO YOU TALK ON THE PHONE WITH FAMILY, FRIENDS, OR NEIGHBORS?: MORE THAN THREE TIMES A WEEK
DO YOU BELONG TO ANY CLUBS OR ORGANIZATIONS SUCH AS CHURCH GROUPS UNIONS, FRATERNAL OR ATHLETIC GROUPS, OR SCHOOL GROUPS?: NO
HOW OFTEN DO YOU GET TOGETHER WITH FRIENDS OR RELATIVES?: ONCE A WEEK
HOW OFTEN DO YOU HAVE SIX OR MORE DRINKS ON ONE OCCASION: NEVER
HOW HARD IS IT FOR YOU TO PAY FOR THE VERY BASICS LIKE FOOD, HOUSING, MEDICAL CARE, AND HEATING?: NOT VERY HARD
DO YOU BELONG TO ANY CLUBS OR ORGANIZATIONS SUCH AS CHURCH GROUPS UNIONS, FRATERNAL OR ATHLETIC GROUPS, OR SCHOOL GROUPS?: NO
WITHIN THE PAST 12 MONTHS, YOU WORRIED THAT YOUR FOOD WOULD RUN OUT BEFORE YOU GOT THE MONEY TO BUY MORE: NEVER TRUE
HOW OFTEN DO YOU ATTEND CHURCH OR RELIGIOUS SERVICES?: MORE THAN 4 TIMES PER YEAR
HOW OFTEN DO YOU ATTEND CHURCH OR RELIGIOUS SERVICES?: MORE THAN 4 TIMES PER YEAR
HOW MANY DRINKS CONTAINING ALCOHOL DO YOU HAVE ON A TYPICAL DAY WHEN YOU ARE DRINKING: PATIENT DOES NOT DRINK
HOW OFTEN DO YOU GET TOGETHER WITH FRIENDS OR RELATIVES?: ONCE A WEEK
HOW OFTEN DO YOU ATTENT MEETINGS OF THE CLUB OR ORGANIZATION YOU BELONG TO?: NEVER
IN A TYPICAL WEEK, HOW MANY TIMES DO YOU TALK ON THE PHONE WITH FAMILY, FRIENDS, OR NEIGHBORS?: MORE THAN THREE TIMES A WEEK
HOW OFTEN DO YOU ATTENT MEETINGS OF THE CLUB OR ORGANIZATION YOU BELONG TO?: NEVER

## 2023-11-24 ASSESSMENT — LIFESTYLE VARIABLES
SKIP TO QUESTIONS 9-10: 1
HOW OFTEN DO YOU HAVE SIX OR MORE DRINKS ON ONE OCCASION: NEVER
HOW OFTEN DO YOU HAVE A DRINK CONTAINING ALCOHOL: NEVER
HOW MANY STANDARD DRINKS CONTAINING ALCOHOL DO YOU HAVE ON A TYPICAL DAY: PATIENT DOES NOT DRINK
AUDIT-C TOTAL SCORE: 0

## 2023-11-27 ENCOUNTER — OFFICE VISIT (OUTPATIENT)
Dept: MEDICAL GROUP | Facility: MEDICAL CENTER | Age: 63
End: 2023-11-27
Payer: COMMERCIAL

## 2023-11-27 VITALS
HEART RATE: 83 BPM | BODY MASS INDEX: 24.98 KG/M2 | OXYGEN SATURATION: 98 % | TEMPERATURE: 98 F | WEIGHT: 141 LBS | DIASTOLIC BLOOD PRESSURE: 68 MMHG | HEIGHT: 63 IN | RESPIRATION RATE: 16 BRPM | SYSTOLIC BLOOD PRESSURE: 134 MMHG

## 2023-11-27 DIAGNOSIS — E11.9 TYPE 2 DIABETES MELLITUS WITHOUT COMPLICATION, WITHOUT LONG-TERM CURRENT USE OF INSULIN (HCC): ICD-10-CM

## 2023-11-27 DIAGNOSIS — R14.0 ABDOMINAL BLOATING: ICD-10-CM

## 2023-11-27 DIAGNOSIS — Z00.00 ENCOUNTER FOR MEDICAL EXAMINATION TO ESTABLISH CARE: ICD-10-CM

## 2023-11-27 DIAGNOSIS — E78.1 PURE HYPERTRIGLYCERIDEMIA: ICD-10-CM

## 2023-11-27 DIAGNOSIS — D25.9 UTERINE LEIOMYOMA, UNSPECIFIED LOCATION: ICD-10-CM

## 2023-11-27 DIAGNOSIS — M17.0 PRIMARY OSTEOARTHRITIS OF BOTH KNEES: ICD-10-CM

## 2023-11-27 DIAGNOSIS — Z11.4 ENCOUNTER FOR SCREENING FOR HIV: ICD-10-CM

## 2023-11-27 DIAGNOSIS — J30.1 NON-SEASONAL ALLERGIC RHINITIS DUE TO POLLEN: ICD-10-CM

## 2023-11-27 DIAGNOSIS — E03.9 ACQUIRED HYPOTHYROIDISM: ICD-10-CM

## 2023-11-27 LAB
HBA1C MFR BLD: 6.9 % (ref ?–5.8)
POCT INT CON NEG: NEGATIVE
POCT INT CON POS: POSITIVE

## 2023-11-27 PROCEDURE — 83036 HEMOGLOBIN GLYCOSYLATED A1C: CPT | Performed by: STUDENT IN AN ORGANIZED HEALTH CARE EDUCATION/TRAINING PROGRAM

## 2023-11-27 PROCEDURE — 3075F SYST BP GE 130 - 139MM HG: CPT | Performed by: STUDENT IN AN ORGANIZED HEALTH CARE EDUCATION/TRAINING PROGRAM

## 2023-11-27 PROCEDURE — 99214 OFFICE O/P EST MOD 30 MIN: CPT | Performed by: STUDENT IN AN ORGANIZED HEALTH CARE EDUCATION/TRAINING PROGRAM

## 2023-11-27 PROCEDURE — 3078F DIAST BP <80 MM HG: CPT | Performed by: STUDENT IN AN ORGANIZED HEALTH CARE EDUCATION/TRAINING PROGRAM

## 2023-11-27 RX ORDER — AZELASTINE 1 MG/ML
2 SPRAY, METERED NASAL 2 TIMES DAILY
Qty: 30 ML | Refills: 5 | Status: SHIPPED | OUTPATIENT
Start: 2023-11-27

## 2023-11-27 ASSESSMENT — FIBROSIS 4 INDEX: FIB4 SCORE: 0.67

## 2023-11-27 NOTE — PROGRESS NOTES
"Subjective:     CC:  Diagnoses of Type 2 diabetes mellitus without complication, without long-term current use of insulin (HCC), Acquired hypothyroidism, Encounter for screening for HIV, Primary osteoarthritis of both knees, Pure hypertriglyceridemia, Uterine leiomyoma, unspecified location, Non-seasonal allergic rhinitis due to pollen, and Abdominal bloating were pertinent to this visit.    HISTORY OF THE PRESENT ILLNESS: Patient is a 63 y.o. female. This pleasant patient is here today to establish care and discuss th following    Problem   Abdominal Bloating    Chronic condition.  She also chronic constipation.  She feels like both are worsening.  She does use Metamucil daily and works on eating a high-fiber diet.  She is requesting imaging.     Non-Seasonal Allergic Rhinitis Due to Pollen    Chronic condition, currently uses azelastine nasal spray as needed     Primary Osteoarthritis of Both Knees    Chronic condition, takes  MoveFree, tumeric, Diclofenac gel, Tylenol PRN for pain   Working with [facility], improving with physical therapy.        Type 2 Diabetes Mellitus Without Complication, Without Long-Term Current Use of Insulin ([facility])    Chronic condition, A1c is currently 6.9.  She is currently on Jardiance 10 mg daily and glimepiride 4 mg daily     Pure Hypertriglyceridemia    Chronic condition, previously declined statin, no recent labs     Fibroid uterus_Dr. Clancy    Patient has history of chronic, recurrent uterine fibroid, status post fibroidectomy during pregnancy in [place]. She had repeat pelvic ultrasound in February 2017, which noted for one large fibroid approximately 8 cm and 2 small endocervical canal masses. Patient is asymptomatic.  Due for repeat ultrasound       Hypothyroidism    Chronic condition, currently levothyroxine 75 mcg every morning, no recent labs       ROS:   ROS      Objective:     Exam: /68   Pulse 83   Temp 36.7 °C (98 °F) (Temporal)   Resp 16   Ht 1.6 m (5' 3\")   Wt " 64 kg (141 lb)   SpO2 98%  Body mass index is 24.98 kg/m².    Physical Exam  Vitals reviewed.   Constitutional:       General: She is not in acute distress.     Appearance: She is not toxic-appearing.   HENT:      Head: Normocephalic and atraumatic.      Right Ear: External ear normal.      Left Ear: External ear normal.   Eyes:      General:         Right eye: No discharge.         Left eye: No discharge.      Extraocular Movements: Extraocular movements intact.      Conjunctiva/sclera: Conjunctivae normal.   Cardiovascular:      Rate and Rhythm: Normal rate and regular rhythm.      Heart sounds: Murmur heard.   Pulmonary:      Effort: Pulmonary effort is normal. No respiratory distress.      Breath sounds: Normal breath sounds. No wheezing or rales.   Abdominal:      General: Bowel sounds are normal.      Palpations: Abdomen is soft. There is no mass.      Tenderness: There is no abdominal tenderness. There is no guarding.   Skin:     General: Skin is warm and dry.   Neurological:      Mental Status: She is alert.   Psychiatric:         Mood and Affect: Mood normal.         Behavior: Behavior normal.         Thought Content: Thought content normal.         Judgment: Judgment normal.           Assessment & Plan:   63 y.o. female with the following -    1. Encounter for medical examination to establish care    2. Type 2 diabetes mellitus without complication, without long-term current use of insulin (HCC)  Well-controlled, recheck labs, continue Amaryl and Jardiance at current dosing  - POCT  A1C  - POCT Retinal Eye Exam  - Lipid Profile; Future  - Comp Metabolic Panel; Future    3. Acquired hypothyroidism  Recheck labs, continue levothyroxine at current dosing  - TSH WITH REFLEX TO FT4; Future    4. Primary osteoarthritis of both knees  Refill given  - diclofenac sodium (VOLTAREN) 1 % Gel; APPLY 2-4 GRAMS  TOPICALLY TO AFFECTED AREA 4 TIMES DAILY  Dispense: 100 g; Refill: 5    5. Pure  hypertriglyceridemia  Previously declined statin, recheck labs    6. Uterine leiomyoma, unspecified location  Recheck pelvic ultrasound  - US-PELVIC COMPLETE (TRANSABDOMINAL/TRANSVAGINAL) (COM[facility]); Future    7. Non-seasonal allergic rhinitis due to pollen  Refill given  - azelastine (ASTELIN) 137 MCG/SPRAY nasal spray; Administer 2 Sprays into affected nostril(S) 2 times a day.  Dispense: 30 mL; Refill: 5    8. Abdominal bloating  Abdominal ultrasound ordered due to worsening of bloating constipation  - US-ABDOMEN COMPLETE SURVEY; Future    9. Encounter for screening for HIV  - HIV AG/AB COMBO ASSAY SCREENING; Future      No follow-ups on file.    Please note that this dictation was created using voice recognition software. I have made every reasonable attempt to correct obvious errors, but I expect that there are errors of grammar and possibly content that I did not discover before finalizing the note.

## 2023-12-07 ENCOUNTER — HOSPITAL ENCOUNTER (OUTPATIENT)
Dept: LAB | Facility: MEDICAL CENTER | Age: 63
End: 2023-12-07
Attending: STUDENT IN AN ORGANIZED HEALTH CARE EDUCATION/TRAINING PROGRAM
Payer: COMMERCIAL

## 2023-12-07 DIAGNOSIS — Z11.4 ENCOUNTER FOR SCREENING FOR HIV: ICD-10-CM

## 2023-12-07 DIAGNOSIS — E11.9 TYPE 2 DIABETES MELLITUS WITHOUT COMPLICATION, WITHOUT LONG-TERM CURRENT USE OF INSULIN (HCC): ICD-10-CM

## 2023-12-07 DIAGNOSIS — E03.9 ACQUIRED HYPOTHYROIDISM: ICD-10-CM

## 2023-12-07 LAB
ALBUMIN SERPL BCP-MCNC: 4.5 G/DL (ref 3.2–4.9)
ALBUMIN/GLOB SERPL: 1.5 G/DL
ALP SERPL-CCNC: 73 U/L (ref 30–99)
ALT SERPL-CCNC: 21 U/L (ref 2–50)
ANION GAP SERPL CALC-SCNC: 10 MMOL/L (ref 7–16)
AST SERPL-CCNC: 16 U/L (ref 12–45)
BILIRUB SERPL-MCNC: 0.3 MG/DL (ref 0.1–1.5)
BUN SERPL-MCNC: 13 MG/DL (ref 8–22)
CALCIUM ALBUM COR SERPL-MCNC: 8.9 MG/DL (ref 8.5–10.5)
CALCIUM SERPL-MCNC: 9.3 MG/DL (ref 8.4–10.2)
CHLORIDE SERPL-SCNC: 100 MMOL/L (ref 96–112)
CHOLEST SERPL-MCNC: 172 MG/DL (ref 100–199)
CO2 SERPL-SCNC: 28 MMOL/L (ref 20–33)
CREAT SERPL-MCNC: 0.49 MG/DL (ref 0.5–1.4)
FASTING STATUS PATIENT QL REPORTED: NORMAL
GFR SERPLBLD CREATININE-BSD FMLA CKD-EPI: 105 ML/MIN/1.73 M 2
GLOBULIN SER CALC-MCNC: 3.1 G/DL (ref 1.9–3.5)
GLUCOSE SERPL-MCNC: 142 MG/DL (ref 65–99)
HDLC SERPL-MCNC: 45 MG/DL
HIV 1+2 AB+HIV1 P24 AG SERPL QL IA: NORMAL
LDLC SERPL CALC-MCNC: 68 MG/DL
POTASSIUM SERPL-SCNC: 4.4 MMOL/L (ref 3.6–5.5)
PROT SERPL-MCNC: 7.6 G/DL (ref 6–8.2)
SODIUM SERPL-SCNC: 138 MMOL/L (ref 135–145)
TRIGL SERPL-MCNC: 293 MG/DL (ref 0–149)
TSH SERPL DL<=0.005 MIU/L-ACNC: 1.35 UIU/ML (ref 0.38–5.33)

## 2023-12-07 PROCEDURE — 80053 COMPREHEN METABOLIC PANEL: CPT

## 2023-12-07 PROCEDURE — 36415 COLL VENOUS BLD VENIPUNCTURE: CPT

## 2023-12-07 PROCEDURE — 80061 LIPID PANEL: CPT

## 2023-12-07 PROCEDURE — 84443 ASSAY THYROID STIM HORMONE: CPT

## 2023-12-07 PROCEDURE — 87389 HIV-1 AG W/HIV-1&-2 AB AG IA: CPT

## 2023-12-18 ENCOUNTER — OFFICE VISIT (OUTPATIENT)
Dept: MEDICAL GROUP | Facility: MEDICAL CENTER | Age: 63
End: 2023-12-18
Payer: COMMERCIAL

## 2023-12-18 ENCOUNTER — HOSPITAL ENCOUNTER (OUTPATIENT)
Facility: MEDICAL CENTER | Age: 63
End: 2023-12-18
Attending: STUDENT IN AN ORGANIZED HEALTH CARE EDUCATION/TRAINING PROGRAM
Payer: COMMERCIAL

## 2023-12-18 VITALS
RESPIRATION RATE: 16 BRPM | SYSTOLIC BLOOD PRESSURE: 128 MMHG | OXYGEN SATURATION: 98 % | DIASTOLIC BLOOD PRESSURE: 66 MMHG | HEART RATE: 90 BPM | BODY MASS INDEX: 24.63 KG/M2 | HEIGHT: 63 IN | WEIGHT: 139 LBS | TEMPERATURE: 98 F

## 2023-12-18 DIAGNOSIS — E55.9 VITAMIN D DEFICIENCY: ICD-10-CM

## 2023-12-18 DIAGNOSIS — Z01.419 WOMEN'S ANNUAL ROUTINE GYNECOLOGICAL EXAMINATION: ICD-10-CM

## 2023-12-18 DIAGNOSIS — Z12.4 SCREENING FOR MALIGNANT NEOPLASM OF CERVIX: ICD-10-CM

## 2023-12-18 DIAGNOSIS — G89.29 CHRONIC RIGHT SHOULDER PAIN: ICD-10-CM

## 2023-12-18 DIAGNOSIS — M25.511 CHRONIC RIGHT SHOULDER PAIN: ICD-10-CM

## 2023-12-18 DIAGNOSIS — D64.9 ANEMIA, UNSPECIFIED TYPE: ICD-10-CM

## 2023-12-18 PROCEDURE — 99396 PREV VISIT EST AGE 40-64: CPT | Performed by: STUDENT IN AN ORGANIZED HEALTH CARE EDUCATION/TRAINING PROGRAM

## 2023-12-18 PROCEDURE — 3078F DIAST BP <80 MM HG: CPT | Performed by: STUDENT IN AN ORGANIZED HEALTH CARE EDUCATION/TRAINING PROGRAM

## 2023-12-18 PROCEDURE — 88175 CYTOPATH C/V AUTO FLUID REDO: CPT

## 2023-12-18 PROCEDURE — 3074F SYST BP LT 130 MM HG: CPT | Performed by: STUDENT IN AN ORGANIZED HEALTH CARE EDUCATION/TRAINING PROGRAM

## 2023-12-18 PROCEDURE — 87624 HPV HI-RISK TYP POOLED RSLT: CPT

## 2023-12-18 ASSESSMENT — FIBROSIS 4 INDEX: FIB4 SCORE: 0.6

## 2023-12-18 NOTE — PROGRESS NOTES
Subjective:     CC: Annual gyn      HPI:   Shona Garcia is a 63 y.o. female who presents for annual exam. She is feeling well and denies any complaints.    Ob-Gyn/ History:    Patient has GYN provider: no  Last Pap Smear:  2018. No history of abnormal pap smears.  Gyn Surgery:  ectopic pregnancy.  Current Contraceptive Method:  Postmenopausal.     Cancer screening  Colorectal Cancer Screening: Up to date    Lung Cancer Screening: NA    Cervical Cancer Screening: Today   Breast Cancer Screening: Up to date     She  has a past medical history of Allergy, Anemia, Blood transfusion (1986), Diabetes mellitus type II (4/18/2011), Dyslipidemia (8/28/2013), GERD (gastroesophageal reflux disease), Headache(784.0), Heart murmur, Measles, Pyelonephritis (2002), S/P colonoscopy (2010), Thyroid disease, Uterine fibroid (1986), Uterine fibroid, and Varicella.    She has no past medical history of Addisons disease (HCC), Adrenal disorder (HCC), Anxiety, Arrhythmia, ASTHMA, CATARACT, CHF (congestive heart failure) (HCC), Clotting disorder (HCC), COPD, Cushings syndrome (HCC), Depression, EMPHYSEMA, Glaucoma, Goiter, Heart attack (HCC), Hypertension, IBD (inflammatory bowel disease), Meningitis, Migraine, Muscle disorder, OSTEOPOROSIS, Parathyroid disorder (HCC), Pituitary disease (HCC), Seizure (HCC), Stroke (HCC), Substance abuse (HCC), Tuberculosis, Ulcer, or Urinary tract infection, site not specified.  She  has a past surgical history that includes primary c section; tube & ectopic preg., removal (1989); and lung biopsy open (1995).    Family History   Problem Relation Age of Onset    Hypertension Mother     Diabetes Father     Heart Disease Sister         Congenital Valve disease    Cancer Maternal Aunt         Lung       Social History     Socioeconomic History    Marital status:      Spouse name: Not on file    Number of children: Not on file    Years of education: Not on file    Highest education  level: Master's degree (e.g., MA, MS, Carole, MEd, MSW, RUSTY)   Occupational History    Occupation:      Employer: [employer]   Tobacco Use    Smoking status: Never    Smokeless tobacco: Never   Vaping Use    Vaping Use: Never used   Substance and Sexual Activity    Alcohol use: No    Drug use: No    Sexual activity: Yes     Partners: Male     Birth control/protection: Surgical   Other Topics Concern    Not on file   Social History Narrative    Not on file     Social Determinants of Health     Financial Resource Strain: Low Risk  (11/24/2023)    Overall Financial Resource Strain (CARDIA)     Difficulty of Paying Living Expenses: Not very hard   Food Insecurity: No Food Insecurity (11/24/2023)    Hunger Vital Sign     Worried About Running Out of Food in the Last Year: Never true     Ran Out of Food in the Last Year: Never true   Transportation Needs: No Transportation Needs (11/24/2023)    PRAPARE - Transportation     Lack of Transportation (Medical): No     Lack of Transportation (Non-Medical): No   Physical Activity: Insufficiently Active (11/24/2023)    Exercise Vital Sign     Days of Exercise per Week: 4 days     Minutes of Exercise per Session: 10 min   Stress: Stress Concern Present (11/24/2023)    Citizen of [place] [employer] [employer] - Occupational Stress Questionnaire     Feeling of Stress : To some extent   Social Connections: Moderately Integrated (11/24/2023)    Social Connection and Isolation Panel [NHANES]     Frequency of Communication with Friends and Family: More than three times a week     Frequency of Social Gatherings with Friends and Family: Once a week     Attends Zoroastrianism Services: More than 4 times per year     Active Member of Clubs or Organizations: No     Attends Club or Organization Meetings: Never     Marital Status:    Intimate Partner Violence: Not on file   Housing Stability: Unknown (11/24/2023)    Housing Stability Vital Sign     Unable to Pay for Housing in the  Last Year: No     Number of Places Lived in the Last Year: Not on file     Unstable Housing in the Last Year: No       Patient Active Problem List    Diagnosis Date Noted    Abdominal bloating 11/27/2023    Non-seasonal allergic rhinitis due to pollen 03/30/2023    Statin declined 09/26/2022    Noncompliance with medications 08/04/2022    Bruxism (teeth grinding) 06/30/2022    TMJ syndrome 06/30/2022    Heart murmur 09/23/2021    Primary osteoarthritis of both knees 02/04/2020    Type 2 diabetes mellitus without complication, without long-term current use of insulin ([facility]) 07/14/2017    Pure hypertriglyceridemia 08/28/2013    Fibroid uterus_Dr. Clancy 09/14/2010    Hypothyroidism 09/14/2010         Current Outpatient Medications   Medication Sig Dispense Refill    diclofenac sodium (VOLTAREN) 1 % Gel APPLY 2-4 GRAMS  TOPICALLY TO AFFECTED AREA 4 TIMES DAILY 100 g 5    azelastine (ASTELIN) 137 MCG/SPRAY nasal spray Administer 2 Sprays into affected nostril(S) 2 times a day. 30 mL 5    influenza vaccine quad (FLUZONE QUADRIVALENT) 0.5 ML Suspension Prefilled Syringe injection Inject  into the shoulder, thigh, or buttocks. 0.5 mL 0    multivitamin Tab Take 1 Tablet by mouth every day.      glucosamine Sulfate 500 MG Cap Take 500 mg by mouth 3 times a day with meals.      TURMERIC PO Take  by mouth.      Blood Glucose Monitoring Suppl Device Meter: Dispense Device of Insurance Preference. Sig. Check blood sugar 1 daily and PRN for symptoms of low or high blood sugar. 1 Each 0    Glucose Blood (BLOOD GLUCOSE TEST STRIPS) Strip Test fasting blood sugar once daily and PRN for symptoms of high or low blood sugar 100 Strip 5    Empagliflozin (JARDIANCE) 10 MG Tab tablet Take 1 Tablet by mouth every day. Prior to first meal of the day 90 Tablet 1    glimepiride (AMARYL) 4 MG Tab Take 1 Tablet by mouth every morning. 90 Tablet 1    levothyroxine (SYNTHROID) 75 MCG Tab Take 1 Tablet by mouth every morning on an empty stomach.  "90 Tablet 3    Lidocaine 4 % Patch Apply patch to painful area. Patch may remain in place for up to 12 hours in any 24-hour period. No more than 1 patch can be used in a 24-hour period. 15 Patch 0    Omega-3 Fatty Acids (OMEGA 3 PO) Take  by mouth.      vitamin D (CHOLECALCIFEROL) 1000 UNIT TABS Take 2 Tablets by mouth every day.       No current facility-administered medications for this visit.     Allergies   Allergen Reactions    Metformin      vertigo    Pcn [Penicillins]        Objective:     /66   Pulse 90   Temp 36.7 °C (98 °F) (Temporal)   Resp 16   Ht 1.6 m (5' 3\")   Wt 63 kg (139 lb)   LMP 08/20/2011   SpO2 98%   BMI 24.62 kg/m²   Body mass index is 24.62 kg/m².  Wt Readings from Last 4 Encounters:   12/18/23 63 kg (139 lb)   11/27/23 64 kg (141 lb)   06/30/23 62.6 kg (138 lb)   03/30/23 62.6 kg (138 lb)       Physical Exam:  Constitutional: Well-developed and well-nourished. Not diaphoretic. No distress.   Skin: Skin is warm and dry. No rash noted.  Head: Atraumatic without lesions.  Eyes: Conjunctivae and extraocular motions are normal.   Ears:  External ears unremarkable.   Nose: Nares patent. Septum midline.   Neck: Supple, trachea midline. Normal range of motion.  Lungs: Normal inspiratory effort  :Perineum and external genitalia normal without rash. Vagina with normal and physiologic discharge. Cervix without visible lesions or discharge. Exam without adnexal masses or cervical motion tenderness.  Extremities: No cyanosis, clubbing, erythema, nor edema.   Musculoskeletal: All major joints AROM full in all directions without pain.  Neurological: Alert and oriented x 3.   Psychiatric:  Behavior, mood, and affect are appropriate.    Patient did have some discomfort when opening the speculum.  I asked her if she would like me to stop the exam and she told me that it was okay to continue.     A chaperone was offered to the patient during today's exam. Chaperone name: Rosie WALL was " present.    Assessment and Plan:     1. Women's annual routine gynecological examination        2. Screening for malignant neoplasm of cervix  THINPREP PAP WITH HPV      3. Vitamin D deficiency  VITAMIN D,25 HYDROXY (DEFICIENCY)      4. Anemia, unspecified type  CBC WITH DIFFERENTIAL    IRON/TOTAL IRON BIND    FERRITIN      5. Chronic right shoulder pain          Discussed routine screenings, annual GYN exam completed today.  Follow-up in 1 year for annual    Please note that this dictation was created using voice recognition software. I have made every reasonable attempt to correct obvious errors, but I expect that there are errors of grammar and possibly content that I did not discover before finalizing the note.    Follow-up: No follow-ups on file.

## 2023-12-19 ENCOUNTER — HOSPITAL ENCOUNTER (OUTPATIENT)
Dept: RADIOLOGY | Facility: MEDICAL CENTER | Age: 63
End: 2023-12-19
Attending: STUDENT IN AN ORGANIZED HEALTH CARE EDUCATION/TRAINING PROGRAM
Payer: COMMERCIAL

## 2023-12-19 DIAGNOSIS — D25.9 UTERINE LEIOMYOMA, UNSPECIFIED LOCATION: ICD-10-CM

## 2023-12-19 PROCEDURE — 76830 TRANSVAGINAL US NON-OB: CPT

## 2023-12-21 LAB
CYTOLOGIST CVX/VAG CYTO: NORMAL
CYTOLOGY CVX/VAG DOC CYTO: NORMAL
CYTOLOGY CVX/VAG DOC THIN PREP: NORMAL
HPV I/H RISK 4 DNA CVX QL PROBE+SIG AMP: NEGATIVE
NOTE NL11727A: NORMAL
OTHER STN SPEC: NORMAL
STAT OF ADQ CVX/VAG CYTO-IMP: NORMAL

## 2023-12-24 ENCOUNTER — HOSPITAL ENCOUNTER (OUTPATIENT)
Dept: RADIOLOGY | Facility: MEDICAL CENTER | Age: 63
End: 2023-12-24
Attending: STUDENT IN AN ORGANIZED HEALTH CARE EDUCATION/TRAINING PROGRAM
Payer: COMMERCIAL

## 2023-12-24 DIAGNOSIS — R14.0 ABDOMINAL BLOATING: ICD-10-CM

## 2023-12-24 PROCEDURE — 76700 US EXAM ABDOM COMPLETE: CPT

## 2024-01-01 ENCOUNTER — OFFICE VISIT (OUTPATIENT)
Dept: URGENT CARE | Facility: PHYSICIAN GROUP | Age: 64
End: 2024-01-01
Payer: COMMERCIAL

## 2024-01-01 ENCOUNTER — HOSPITAL ENCOUNTER (OUTPATIENT)
Facility: MEDICAL CENTER | Age: 64
End: 2024-01-01
Attending: PHYSICIAN ASSISTANT
Payer: COMMERCIAL

## 2024-01-01 VITALS
SYSTOLIC BLOOD PRESSURE: 126 MMHG | HEIGHT: 63 IN | OXYGEN SATURATION: 98 % | HEART RATE: 76 BPM | DIASTOLIC BLOOD PRESSURE: 78 MMHG | BODY MASS INDEX: 24.98 KG/M2 | WEIGHT: 141 LBS | RESPIRATION RATE: 16 BRPM | TEMPERATURE: 98.2 F

## 2024-01-01 DIAGNOSIS — R30.0 DYSURIA: ICD-10-CM

## 2024-01-01 DIAGNOSIS — N30.01 ACUTE CYSTITIS WITH HEMATURIA: ICD-10-CM

## 2024-01-01 LAB
APPEARANCE UR: CLEAR
BILIRUB UR STRIP-MCNC: NORMAL MG/DL
COLOR UR AUTO: YELLOW
GLUCOSE UR STRIP.AUTO-MCNC: 500 MG/DL
KETONES UR STRIP.AUTO-MCNC: NORMAL MG/DL
LEUKOCYTE ESTERASE UR QL STRIP.AUTO: NORMAL
NITRITE UR QL STRIP.AUTO: NORMAL
PH UR STRIP.AUTO: 5.5 [PH] (ref 5–8)
PROT UR QL STRIP: NORMAL MG/DL
RBC UR QL AUTO: NORMAL
SP GR UR STRIP.AUTO: 1
UROBILINOGEN UR STRIP-MCNC: 0.2 MG/DL

## 2024-01-01 PROCEDURE — 99213 OFFICE O/P EST LOW 20 MIN: CPT | Performed by: PHYSICIAN ASSISTANT

## 2024-01-01 PROCEDURE — 3074F SYST BP LT 130 MM HG: CPT | Performed by: PHYSICIAN ASSISTANT

## 2024-01-01 PROCEDURE — 87086 URINE CULTURE/COLONY COUNT: CPT

## 2024-01-01 PROCEDURE — 87186 SC STD MICRODIL/AGAR DIL: CPT

## 2024-01-01 PROCEDURE — 87077 CULTURE AEROBIC IDENTIFY: CPT

## 2024-01-01 PROCEDURE — 3078F DIAST BP <80 MM HG: CPT | Performed by: PHYSICIAN ASSISTANT

## 2024-01-01 PROCEDURE — 81002 URINALYSIS NONAUTO W/O SCOPE: CPT | Performed by: PHYSICIAN ASSISTANT

## 2024-01-01 RX ORDER — NITROFURANTOIN 25; 75 MG/1; MG/1
100 CAPSULE ORAL 2 TIMES DAILY
Qty: 14 CAPSULE | Refills: 0 | Status: SHIPPED | OUTPATIENT
Start: 2024-01-01 | End: 2024-01-08

## 2024-01-01 ASSESSMENT — ENCOUNTER SYMPTOMS: FEVER: 0

## 2024-01-01 ASSESSMENT — FIBROSIS 4 INDEX: FIB4 SCORE: 0.6

## 2024-01-01 NOTE — PROGRESS NOTES
"Subjective     Anathasiemery Garcia is a 63 y.o. female who presents with UTI (X 1 day)            Patient presents with dysuria urinary frequency since last night.  Patient denies fever, chills, flank pain or nausea, vomiting or diarrhea.  Patient has had UTI in the past, states it feels very similar to her.  Patient has not taken any over-the-counter medications for her symptoms.  No other complaints      UTI  This is a new problem. The current episode started yesterday. The problem occurs constantly. The problem has been gradually worsening. Associated symptoms include urinary symptoms. Pertinent negatives include no fever. She has tried drinking for the symptoms. The treatment provided no relief.       Review of Systems   Constitutional:  Negative for fever.   Genitourinary:  Positive for dysuria and frequency.   All other systems reviewed and are negative.             Objective     /78 (BP Location: Right arm, Patient Position: Sitting, BP Cuff Size: Adult)   Pulse 76   Temp 36.8 °C (98.2 °F) (Temporal)   Resp 16   Ht 1.6 m (5' 3\")   Wt 64 kg (141 lb)   LMP 08/20/2011   SpO2 98%   BMI 24.98 kg/m²      Physical Exam  Vitals and nursing note reviewed.   Constitutional:       General: She is not in acute distress.     Appearance: Normal appearance. She is well-developed and normal weight. She is not ill-appearing or toxic-appearing.   HENT:      Head: Normocephalic and atraumatic.      Right Ear: Tympanic membrane normal.      Left Ear: Tympanic membrane normal.      Nose: Nose normal.      Mouth/Throat:      Lips: Pink.      Mouth: Mucous membranes are moist.      Pharynx: Oropharynx is clear. Uvula midline.   Eyes:      Extraocular Movements: Extraocular movements intact.      Conjunctiva/sclera: Conjunctivae normal.      Pupils: Pupils are equal, round, and reactive to light.   Cardiovascular:      Rate and Rhythm: Normal rate and regular rhythm.      Pulses: Normal pulses.      Heart " sounds: Normal heart sounds.   Pulmonary:      Effort: Pulmonary effort is normal.      Breath sounds: Normal breath sounds.   Abdominal:      General: Bowel sounds are normal.      Palpations: Abdomen is soft.   Musculoskeletal:         General: Normal range of motion.      Cervical back: Normal range of motion and neck supple.   Skin:     General: Skin is warm and dry.      Capillary Refill: Capillary refill takes less than 2 seconds.   Neurological:      General: No focal deficit present.      Mental Status: She is alert and oriented to person, place, and time.      Cranial Nerves: No cranial nerve deficit.      Motor: Motor function is intact.      Coordination: Coordination is intact.      Gait: Gait normal.   Psychiatric:         Mood and Affect: Mood normal.                             Assessment & Plan                1. Dysuria  URINE CULTURE(NEW)    nitrofurantoin (MACROBID) 100 MG Cap      2. Acute cystitis with hematuria  URINE CULTURE(NEW)    nitrofurantoin (MACROBID) 100 MG Cap        UA: Positive leukocytes, positive blood, cloudy    Patient HPI physical exam POCT UA support diagnosis of acute cystitis with hematuria.  I will start patient on Macrobid twice daily x 7 days.    Culture sent to lab, will call with any necessary treatment or treatment changes.     Differential diagnosis, supportive care, and indications for immediate follow-up discussed with patient.  Instructed to return to clinic or nearest emergency department for any change in condition, further concerns, or worsening of symptoms.    I personally reviewed prior external notes and test results pertinent to today's visit.  I have independently reviewed and interpreted all diagnostics ordered during this urgent care visit.    PT should follow up with PCP in 1-2 days for re-evaluation if symptoms have not improved.      Discussed red flags and reasons to return to UC or ED.      Pt and/or family verbalized understanding of diagnosis and  follow up instructions and was offered informational handout on diagnosis.  PT discharged.     Please note that this dictation was created using voice recognition software. I have made every reasonable attempt to correct obvious errors, but I expect that there may be errors of grammar and possibly content that I did not discover before finalizing the note.

## 2024-01-02 DIAGNOSIS — N30.01 ACUTE CYSTITIS WITH HEMATURIA: ICD-10-CM

## 2024-01-02 DIAGNOSIS — R30.0 DYSURIA: ICD-10-CM

## 2024-01-04 LAB
BACTERIA UR CULT: ABNORMAL
BACTERIA UR CULT: ABNORMAL
SIGNIFICANT IND 70042: ABNORMAL
SITE SITE: ABNORMAL
SOURCE SOURCE: ABNORMAL

## 2024-01-21 ENCOUNTER — PATIENT MESSAGE (OUTPATIENT)
Dept: MEDICAL GROUP | Facility: MEDICAL CENTER | Age: 64
End: 2024-01-21
Payer: COMMERCIAL

## 2024-01-21 DIAGNOSIS — E11.9 TYPE 2 DIABETES MELLITUS WITHOUT COMPLICATION, WITHOUT LONG-TERM CURRENT USE OF INSULIN (HCC): ICD-10-CM

## 2024-01-21 DIAGNOSIS — K82.4 GALLBLADDER POLYP: ICD-10-CM

## 2024-01-21 DIAGNOSIS — R21 RASH: ICD-10-CM

## 2024-01-22 ENCOUNTER — OFFICE VISIT (OUTPATIENT)
Dept: URGENT CARE | Facility: CLINIC | Age: 64
End: 2024-01-22
Payer: COMMERCIAL

## 2024-01-22 VITALS
RESPIRATION RATE: 20 BRPM | OXYGEN SATURATION: 98 % | HEIGHT: 63 IN | WEIGHT: 140 LBS | HEART RATE: 77 BPM | TEMPERATURE: 98 F | BODY MASS INDEX: 24.8 KG/M2 | SYSTOLIC BLOOD PRESSURE: 122 MMHG | DIASTOLIC BLOOD PRESSURE: 80 MMHG

## 2024-01-22 DIAGNOSIS — L03.90 CELLULITIS, UNSPECIFIED CELLULITIS SITE: ICD-10-CM

## 2024-01-22 PROBLEM — K82.4 GALLBLADDER POLYP: Status: ACTIVE | Noted: 2024-01-22

## 2024-01-22 PROCEDURE — 99213 OFFICE O/P EST LOW 20 MIN: CPT | Performed by: FAMILY MEDICINE

## 2024-01-22 PROCEDURE — 3079F DIAST BP 80-89 MM HG: CPT | Performed by: FAMILY MEDICINE

## 2024-01-22 PROCEDURE — 3074F SYST BP LT 130 MM HG: CPT | Performed by: FAMILY MEDICINE

## 2024-01-22 RX ORDER — GLUCOSAMINE HCL/CHONDROITIN SU 500-400 MG
CAPSULE ORAL
Qty: 100 STRIP | Refills: 5 | Status: SHIPPED | OUTPATIENT
Start: 2024-01-22

## 2024-01-22 RX ORDER — SULFAMETHOXAZOLE AND TRIMETHOPRIM 800; 160 MG/1; MG/1
1 TABLET ORAL 2 TIMES DAILY
Qty: 10 TABLET | Refills: 0 | Status: SHIPPED | OUTPATIENT
Start: 2024-01-22 | End: 2024-01-27

## 2024-01-22 ASSESSMENT — FIBROSIS 4 INDEX: FIB4 SCORE: 0.6

## 2024-01-22 NOTE — TELEPHONE ENCOUNTER
Received request via: Pharmacy    Was the patient seen in the last year in this department? Yes    Does the patient have an active prescription (recently filled or refills available) for medication(s) requested? No    Pharmacy Name: [facility]    Does the patient have senior living Plus and need 100 day supply (blood pressure, diabetes and cholesterol meds only)? Patient does not have SCP

## 2024-01-22 NOTE — PROGRESS NOTES
"  Subjective:      63 y.o. female presents to urgent care for rash that has been present for about 3 weeks.  There is no inciting event or trauma at that time.  Rash initially started on her upper extremities bilaterally, and has since spread to her lower extremities as well.  There is associated itch.  She has tried cortisone and Benadryl with only some relief in symptoms.  She remains afebrile.        She denies any other questions or concerns at this time.    Current problem list, medication, and past medical/surgical history were reviewed in Epic.    ROS  See HPI     Objective:      /80   Pulse 77   Temp 36.7 °C (98 °F) (Temporal)   Resp 20   Ht 1.6 m (5' 3\")   Wt 63.5 kg (140 lb)   LMP 08/20/2011   SpO2 98%   BMI 24.80 kg/m²     Physical Exam  Constitutional:       General: She is not in acute distress.     Appearance: She is not diaphoretic.   Cardiovascular:      Rate and Rhythm: Normal rate and regular rhythm.      Heart sounds: Normal heart sounds.   Pulmonary:      Effort: Pulmonary effort is normal. No respiratory distress.      Breath sounds: Normal breath sounds.   Skin:     Findings: Rash (to the mid region of her upper extremities bilaterally as well as her shins.  Rash is erythematous and warm to touch.  No open areas or discharge.) present.   Neurological:      Mental Status: She is alert.   Psychiatric:         Mood and Affect: Affect normal.         Judgment: Judgment normal.       Assessment/Plan:     1. Cellulitis, unspecified cellulitis site  Prescription for Bactrim has been sent.  I do suspect there is a component of eczema as well, we discussed lingering CeraVe with Vaseline.  May use cortisone creams as needed.   - sulfamethoxazole-trimethoprim (BACTRIM DS) 800-160 MG tablet; Take 1 Tablet by mouth 2 times a day for 5 days.  Dispense: 10 Tablet; Refill: 0      Instructed to return to Urgent Care or nearest Emergency Department if symptoms fail to improve, for any change in " condition, further concerns, or new concerning symptoms. Patient states understanding of the plan of care and discharge instructions.    Andree Bo M.D.

## 2024-01-22 NOTE — LETTER
January 22, 2024    To Whom It May Concern:         This is confirmation that Madisonemery Wood Radha attended her scheduled appointment with Andree Bo M.D. on 1/22/24. She may return to work tomorrow without any restrictions.          If you have any questions please do not hesitate to call me at the phone number listed below.    Sincerely,          Andree Bo M.D.  [phone]

## 2024-02-01 ENCOUNTER — PATIENT MESSAGE (OUTPATIENT)
Dept: HEALTH INFORMATION MANAGEMENT | Facility: OTHER | Age: 64
End: 2024-02-01

## 2024-02-15 ENCOUNTER — HOSPITAL ENCOUNTER (OUTPATIENT)
Dept: LAB | Facility: MEDICAL CENTER | Age: 64
End: 2024-02-15
Attending: STUDENT IN AN ORGANIZED HEALTH CARE EDUCATION/TRAINING PROGRAM
Payer: COMMERCIAL

## 2024-02-15 DIAGNOSIS — E55.9 VITAMIN D DEFICIENCY: ICD-10-CM

## 2024-02-15 DIAGNOSIS — D64.9 ANEMIA, UNSPECIFIED TYPE: ICD-10-CM

## 2024-02-15 DIAGNOSIS — E11.9 TYPE 2 DIABETES MELLITUS WITHOUT COMPLICATION, WITHOUT LONG-TERM CURRENT USE OF INSULIN (HCC): ICD-10-CM

## 2024-02-15 LAB
25(OH)D3 SERPL-MCNC: 51 NG/ML (ref 30–100)
BASOPHILS # BLD AUTO: 0.5 % (ref 0–1.8)
BASOPHILS # BLD: 0.03 K/UL (ref 0–0.12)
EOSINOPHIL # BLD AUTO: 0.09 K/UL (ref 0–0.51)
EOSINOPHIL NFR BLD: 1.5 % (ref 0–6.9)
ERYTHROCYTE [DISTWIDTH] IN BLOOD BY AUTOMATED COUNT: 43.7 FL (ref 35.9–50)
FERRITIN SERPL-MCNC: 61.8 NG/ML (ref 10–291)
HCT VFR BLD AUTO: 42.9 % (ref 37–47)
HGB BLD-MCNC: 13.9 G/DL (ref 12–16)
IMM GRANULOCYTES # BLD AUTO: 0.01 K/UL (ref 0–0.11)
IMM GRANULOCYTES NFR BLD AUTO: 0.2 % (ref 0–0.9)
IRON SATN MFR SERPL: 41 % (ref 15–55)
IRON SERPL-MCNC: 138 UG/DL (ref 40–170)
LYMPHOCYTES # BLD AUTO: 2.44 K/UL (ref 1–4.8)
LYMPHOCYTES NFR BLD: 40.2 % (ref 22–41)
MCH RBC QN AUTO: 28.4 PG (ref 27–33)
MCHC RBC AUTO-ENTMCNC: 32.4 G/DL (ref 32.2–35.5)
MCV RBC AUTO: 87.7 FL (ref 81.4–97.8)
MONOCYTES # BLD AUTO: 0.42 K/UL (ref 0–0.85)
MONOCYTES NFR BLD AUTO: 6.9 % (ref 0–13.4)
NEUTROPHILS # BLD AUTO: 3.08 K/UL (ref 1.82–7.42)
NEUTROPHILS NFR BLD: 50.7 % (ref 44–72)
NRBC # BLD AUTO: 0 K/UL
NRBC BLD-RTO: 0 /100 WBC (ref 0–0.2)
PLATELET # BLD AUTO: 332 K/UL (ref 164–446)
PMV BLD AUTO: 9.1 FL (ref 9–12.9)
RBC # BLD AUTO: 4.89 M/UL (ref 4.2–5.4)
TIBC SERPL-MCNC: 338 UG/DL (ref 250–450)
UIBC SERPL-MCNC: 200 UG/DL (ref 110–370)
WBC # BLD AUTO: 6.1 K/UL (ref 4.8–10.8)

## 2024-02-15 PROCEDURE — 36415 COLL VENOUS BLD VENIPUNCTURE: CPT

## 2024-02-15 PROCEDURE — 82728 ASSAY OF FERRITIN: CPT

## 2024-02-15 PROCEDURE — 83550 IRON BINDING TEST: CPT

## 2024-02-15 PROCEDURE — 82306 VITAMIN D 25 HYDROXY: CPT

## 2024-02-15 PROCEDURE — 85025 COMPLETE CBC W/AUTO DIFF WBC: CPT

## 2024-02-15 PROCEDURE — 83540 ASSAY OF IRON: CPT

## 2024-03-29 DIAGNOSIS — E11.9 TYPE 2 DIABETES MELLITUS WITHOUT COMPLICATION, WITHOUT LONG-TERM CURRENT USE OF INSULIN (HCC): ICD-10-CM

## 2024-03-29 RX ORDER — EMPAGLIFLOZIN 10 MG/1
TABLET, FILM COATED ORAL
Qty: 90 TABLET | Refills: 3 | Status: SHIPPED | OUTPATIENT
Start: 2024-03-29

## 2024-04-21 DIAGNOSIS — E03.9 ACQUIRED HYPOTHYROIDISM: ICD-10-CM

## 2024-04-21 DIAGNOSIS — E11.9 TYPE 2 DIABETES MELLITUS WITHOUT COMPLICATION, WITHOUT LONG-TERM CURRENT USE OF INSULIN (HCC): ICD-10-CM

## 2024-04-22 RX ORDER — LEVOTHYROXINE SODIUM 0.07 MG/1
75 TABLET ORAL
Qty: 30 TABLET | Refills: 0 | Status: SHIPPED | OUTPATIENT
Start: 2024-04-22 | End: 2024-04-26 | Stop reason: SDUPTHER

## 2024-04-22 RX ORDER — GLIMEPIRIDE 4 MG/1
4 TABLET ORAL EVERY MORNING
Qty: 30 TABLET | Refills: 0 | Status: SHIPPED | OUTPATIENT
Start: 2024-04-22

## 2024-04-26 DIAGNOSIS — E03.9 ACQUIRED HYPOTHYROIDISM: ICD-10-CM

## 2024-04-26 RX ORDER — LEVOTHYROXINE SODIUM 0.07 MG/1
75 TABLET ORAL
Qty: 90 TABLET | Refills: 1 | Status: SHIPPED | OUTPATIENT
Start: 2024-04-26

## 2024-04-26 NOTE — TELEPHONE ENCOUNTER
Received request via: Pharmacy    Was the patient seen in the last year in this department? Yes    Does the patient have an active prescription (recently filled or refills available) for medication(s) requested? No    Pharmacy Name: [facility]    Does the patient have senior living Plus and need 100 day supply (blood pressure, diabetes and csterol meds only)? Patient does not have SCP

## 2024-05-17 DIAGNOSIS — E11.9 TYPE 2 DIABETES MELLITUS WITHOUT COMPLICATION, WITHOUT LONG-TERM CURRENT USE OF INSULIN (HCC): ICD-10-CM

## 2024-05-17 RX ORDER — GLIMEPIRIDE 4 MG/1
4 TABLET ORAL EVERY MORNING
Qty: 90 TABLET | Refills: 3 | Status: SHIPPED | OUTPATIENT
Start: 2024-05-17

## 2024-05-20 ENCOUNTER — OFFICE VISIT (OUTPATIENT)
Dept: MEDICAL GROUP | Facility: MEDICAL CENTER | Age: 64
End: 2024-05-20
Payer: COMMERCIAL

## 2024-05-20 VITALS
WEIGHT: 136.8 LBS | SYSTOLIC BLOOD PRESSURE: 136 MMHG | HEIGHT: 63 IN | BODY MASS INDEX: 24.24 KG/M2 | DIASTOLIC BLOOD PRESSURE: 78 MMHG | TEMPERATURE: 97.9 F | OXYGEN SATURATION: 99 % | HEART RATE: 75 BPM

## 2024-05-20 DIAGNOSIS — K82.4 GALLBLADDER POLYP: ICD-10-CM

## 2024-05-20 DIAGNOSIS — E11.9 TYPE 2 DIABETES MELLITUS WITHOUT COMPLICATION, WITHOUT LONG-TERM CURRENT USE OF INSULIN (HCC): ICD-10-CM

## 2024-05-20 LAB
HBA1C MFR BLD: 7.6 % (ref ?–5.8)
POCT INT CON NEG: NEGATIVE
POCT INT CON POS: POSITIVE

## 2024-05-20 PROCEDURE — 3075F SYST BP GE 130 - 139MM HG: CPT | Performed by: STUDENT IN AN ORGANIZED HEALTH CARE EDUCATION/TRAINING PROGRAM

## 2024-05-20 PROCEDURE — 3078F DIAST BP <80 MM HG: CPT | Performed by: STUDENT IN AN ORGANIZED HEALTH CARE EDUCATION/TRAINING PROGRAM

## 2024-05-20 PROCEDURE — 99214 OFFICE O/P EST MOD 30 MIN: CPT | Performed by: STUDENT IN AN ORGANIZED HEALTH CARE EDUCATION/TRAINING PROGRAM

## 2024-05-20 PROCEDURE — 83036 HEMOGLOBIN GLYCOSYLATED A1C: CPT | Performed by: STUDENT IN AN ORGANIZED HEALTH CARE EDUCATION/TRAINING PROGRAM

## 2024-05-20 RX ORDER — GLUCOSAMINE HCL/CHONDROITIN SU 500-400 MG
CAPSULE ORAL
Qty: 100 STRIP | Refills: 5 | Status: SHIPPED | OUTPATIENT
Start: 2024-05-20

## 2024-05-20 ASSESSMENT — PATIENT HEALTH QUESTIONNAIRE - PHQ9: CLINICAL INTERPRETATION OF PHQ2 SCORE: 0

## 2024-05-20 ASSESSMENT — FIBROSIS 4 INDEX: FIB4 SCORE: 0.67

## 2024-05-20 NOTE — PROGRESS NOTES
"Subjective:     CC: Gallbladder polyp, diabetes    HPI:   Anathasia presents today with    Problem   Gallbladder Polyp    This is a new condition seen on recent imaging.  2 mm gallbladder polyp seen.  She did follow with surgery who recommended gallbladder removal but she is hoping for second opinion and to monitor     Type 2 Diabetes Mellitus Without Complication, Without Long-Term Current Use of Insulin (Hcc)    Chronic condition, A1c is currently 7.8.  She is currently on Jardiance 10 mg daily and glimepiride 4 mg daily       ROS:  ROS    Objective:     Exam:  /78   Pulse 75   Temp 36.6 °C (97.9 °F) (Temporal)   Ht 1.6 m (5' 3\")   Wt 62.1 kg (136 lb 12.8 oz)   LMP 08/20/2011   SpO2 99%   BMI 24.23 kg/m²  Body mass index is 24.23 kg/m².    Physical Exam  Vitals reviewed.   Constitutional:       General: She is not in acute distress.     Appearance: She is not toxic-appearing.   HENT:      Head: Normocephalic and atraumatic.      Right Ear: External ear normal.      Left Ear: External ear normal.   Eyes:      General:         Right eye: No discharge.         Left eye: No discharge.      Extraocular Movements: Extraocular movements intact.      Conjunctiva/sclera: Conjunctivae normal.   Pulmonary:      Effort: Pulmonary effort is normal. No respiratory distress.   Feet:      Right foot:      Protective Sensation: 3 sites tested.  3 sites sensed.      Left foot:      Protective Sensation: 3 sites tested.  3 sites sensed.   Skin:     General: Skin is warm and dry.   Neurological:      Mental Status: She is alert.   Psychiatric:         Mood and Affect: Mood normal.         Behavior: Behavior normal.         Thought Content: Thought content normal.         Judgment: Judgment normal.           Assessment & Plan:     63 y.o. female with the following -     1. Type 2 diabetes mellitus without complication, without long-term current use of insulin (HCC)  A1c today is 7.8 which is increased from previous.  We " discussed keeping glimepiride the same and increasing Jardiance.  Monofilament exam completed today, retina view results requested from provider  - POCT  A1C  - Diabetic Monofilament LE Exam  - Empagliflozin 25 MG Tab; Take 25 mg by mouth every day.  Dispense: 90 Tablet; Refill: 1  - Glucose Blood (BLOOD GLUCOSE TEST STRIPS) Strip; Test fasting blood sugar once daily and PRN for symptoms of high or low blood sugar  Dispense: 100 Strip; Refill: 5    2. Gallbladder polyp  Recheck ultrasound to assess stability and referral to another general surgeon for second opinion  - US-RUQ; Future  - Referral to General Surgery    No follow-ups on file.    Please note that this dictation was created using voice recognition software. I have made every reasonable attempt to correct obvious errors, but I expect that there are errors of grammar and possibly content that I did not discover before finalizing the note.

## 2024-05-20 NOTE — LETTER
formerly [facility]  Yovana Grey M.D.  [address]  [place] [address]  Fax: [phone]   Authorization for Release/Disclosure of   Protected Health Information   Name: ADELFO KAUFMAN : 1960 SSN: [id number]   Address: [address] [place] [address] [address] [place] [address] Phone:    [phone] (home) [phone] (work)   I authorize the entity listed below to release/disclose the PHI below to:   formerly [facility]/Yovana Grey M.D. and Yovana Grey M.D.   Provider or Entity Name:  [facility]   Address   City, State, [facility]   Phone:      Fax:     Reason for request: continuity of care   Information to be released:    [  ] LAST COLONOSCOPY,  including any PATH REPORT and follow-up  [  ] LAST FIT/COLOGUARD RESULT [  ] LAST DEXA  [  ] LAST MAMMOGRAM  [  ] LAST PAP  [  ] LAST LABS [ XX ] RETINA EXAM REPORT  [  ] IMMUNIZATION RECORDS  [  ] Release all info      [  ] Check here and initial the line next to each item to release ALL health information INCLUDING  _____ Care and treatment for drug and / or alcohol abuse  _____ HIV testing, infection status, or AIDS  _____ Genetic Testing    DATES OF SERVICE OR TIME PERIOD TO BE DISCLOSED: _____________  I understand and acknowledge that:  * This Authorization may be revoked at any time by you in writing, except if your health information has already been used or disclosed.  * Your health information that will be used or disclosed as a result of you signing this authorization could be re-disclosed by the recipient. If this occurs, your re-disclosed health information may no longer be protected by State or Federal laws.  * You may refuse to sign this Authorization. Your refusal will not affect your ability to obtain treatment.  * This Authorization becomes effective upon signing and will  on (date) __________.      If no date is indicated, this Authorization will  one (1) year from the signature date.    Name: Adelfo  Israel Garcia  Signature: Date:   5/20/2024     PLEASE FAX REQUESTED RECORDS BACK TO: [phone]

## 2024-06-06 DIAGNOSIS — E11.9 TYPE 2 DIABETES MELLITUS WITHOUT COMPLICATION, WITHOUT LONG-TERM CURRENT USE OF INSULIN (HCC): ICD-10-CM

## 2024-06-10 RX ORDER — EMPAGLIFLOZIN 10 MG/1
TABLET, FILM COATED ORAL
Qty: 90 TABLET | Refills: 0 | OUTPATIENT
Start: 2024-06-10

## 2024-06-12 ENCOUNTER — TELEPHONE (OUTPATIENT)
Dept: MEDICAL GROUP | Facility: MEDICAL CENTER | Age: 64
End: 2024-06-12
Payer: COMMERCIAL

## 2024-06-12 DIAGNOSIS — E11.9 TYPE 2 DIABETES MELLITUS WITHOUT COMPLICATION, WITHOUT LONG-TERM CURRENT USE OF INSULIN (HCC): ICD-10-CM

## 2024-06-12 NOTE — TELEPHONE ENCOUNTER
[facility] RX insurance called req a RX for jardiance to be faxed to [facility]. They stated it will be covered for a 30 day supply. FAX # [phone].

## 2024-06-16 ENCOUNTER — HOSPITAL ENCOUNTER (OUTPATIENT)
Dept: RADIOLOGY | Facility: MEDICAL CENTER | Age: 64
End: 2024-06-16
Attending: STUDENT IN AN ORGANIZED HEALTH CARE EDUCATION/TRAINING PROGRAM
Payer: COMMERCIAL

## 2024-06-16 DIAGNOSIS — K82.4 GALLBLADDER POLYP: ICD-10-CM

## 2024-06-16 PROCEDURE — 76705 ECHO EXAM OF ABDOMEN: CPT

## 2024-06-18 DIAGNOSIS — E11.9 TYPE 2 DIABETES MELLITUS WITHOUT COMPLICATION, WITHOUT LONG-TERM CURRENT USE OF INSULIN (HCC): ICD-10-CM

## 2024-06-18 NOTE — TELEPHONE ENCOUNTER
Received request via: Pharmacy    Was the patient seen in the last year in this department? Yes    Does the patient have an active prescription (recently filled or refills available) for medication(s) requested? No    [facility]    Does the patient have [facility]way Plus and need 100 day supply (blood pressure, diabetes and cholesterol meds only)? Patient does not have SCP

## 2024-08-22 ENCOUNTER — OFFICE VISIT (OUTPATIENT)
Dept: MEDICAL GROUP | Facility: MEDICAL CENTER | Age: 64
End: 2024-08-22
Payer: COMMERCIAL

## 2024-08-22 ENCOUNTER — HOSPITAL ENCOUNTER (OUTPATIENT)
Facility: MEDICAL CENTER | Age: 64
End: 2024-08-22
Attending: STUDENT IN AN ORGANIZED HEALTH CARE EDUCATION/TRAINING PROGRAM
Payer: COMMERCIAL

## 2024-08-22 VITALS
RESPIRATION RATE: 16 BRPM | WEIGHT: 140 LBS | DIASTOLIC BLOOD PRESSURE: 66 MMHG | HEART RATE: 85 BPM | OXYGEN SATURATION: 100 % | SYSTOLIC BLOOD PRESSURE: 130 MMHG | TEMPERATURE: 97.3 F | BODY MASS INDEX: 24.8 KG/M2 | HEIGHT: 63 IN

## 2024-08-22 DIAGNOSIS — N39.0 RECURRENT UTI: ICD-10-CM

## 2024-08-22 DIAGNOSIS — E11.9 TYPE 2 DIABETES MELLITUS WITHOUT COMPLICATION, WITHOUT LONG-TERM CURRENT USE OF INSULIN (HCC): ICD-10-CM

## 2024-08-22 DIAGNOSIS — Z13.21 ENCOUNTER FOR VITAMIN DEFICIENCY SCREENING: ICD-10-CM

## 2024-08-22 DIAGNOSIS — Z23 IMMUNIZATION DUE: ICD-10-CM

## 2024-08-22 DIAGNOSIS — E03.9 ACQUIRED HYPOTHYROIDISM: ICD-10-CM

## 2024-08-22 DIAGNOSIS — Z78.0 POSTMENOPAUSAL: ICD-10-CM

## 2024-08-22 DIAGNOSIS — Z29.89 NEED FOR MALARIA PROPHYLAXIS: ICD-10-CM

## 2024-08-22 DIAGNOSIS — E78.1 PURE HYPERTRIGLYCERIDEMIA: ICD-10-CM

## 2024-08-22 LAB
CREAT UR-MCNC: 10.07 MG/DL
HBA1C MFR BLD: 7.6 % (ref ?–5.8)
MICROALBUMIN UR-MCNC: <1.2 MG/DL
MICROALBUMIN/CREAT UR: NORMAL MG/G (ref 0–30)
POCT INT CON NEG: NEGATIVE
POCT INT CON POS: POSITIVE

## 2024-08-22 PROCEDURE — 3078F DIAST BP <80 MM HG: CPT | Performed by: STUDENT IN AN ORGANIZED HEALTH CARE EDUCATION/TRAINING PROGRAM

## 2024-08-22 PROCEDURE — 83036 HEMOGLOBIN GLYCOSYLATED A1C: CPT | Performed by: STUDENT IN AN ORGANIZED HEALTH CARE EDUCATION/TRAINING PROGRAM

## 2024-08-22 PROCEDURE — 82043 UR ALBUMIN QUANTITATIVE: CPT

## 2024-08-22 PROCEDURE — 99214 OFFICE O/P EST MOD 30 MIN: CPT | Mod: 25 | Performed by: STUDENT IN AN ORGANIZED HEALTH CARE EDUCATION/TRAINING PROGRAM

## 2024-08-22 PROCEDURE — 90677 PCV20 VACCINE IM: CPT | Performed by: STUDENT IN AN ORGANIZED HEALTH CARE EDUCATION/TRAINING PROGRAM

## 2024-08-22 PROCEDURE — 90471 IMMUNIZATION ADMIN: CPT | Performed by: STUDENT IN AN ORGANIZED HEALTH CARE EDUCATION/TRAINING PROGRAM

## 2024-08-22 PROCEDURE — 90472 IMMUNIZATION ADMIN EACH ADD: CPT | Performed by: STUDENT IN AN ORGANIZED HEALTH CARE EDUCATION/TRAINING PROGRAM

## 2024-08-22 PROCEDURE — 82570 ASSAY OF URINE CREATININE: CPT

## 2024-08-22 PROCEDURE — 3075F SYST BP GE 130 - 139MM HG: CPT | Performed by: STUDENT IN AN ORGANIZED HEALTH CARE EDUCATION/TRAINING PROGRAM

## 2024-08-22 PROCEDURE — 90715 TDAP VACCINE 7 YRS/> IM: CPT | Performed by: STUDENT IN AN ORGANIZED HEALTH CARE EDUCATION/TRAINING PROGRAM

## 2024-08-22 RX ORDER — SULFAMETHOXAZOLE/TRIMETHOPRIM 800-160 MG
1 TABLET ORAL 2 TIMES DAILY
Qty: 6 TABLET | Refills: 0 | Status: SHIPPED | OUTPATIENT
Start: 2024-08-22

## 2024-08-22 RX ORDER — DOXYCYCLINE HYCLATE 100 MG
100 TABLET ORAL DAILY
Qty: 52 TABLET | Refills: 0 | Status: SHIPPED | OUTPATIENT
Start: 2024-08-22

## 2024-08-22 ASSESSMENT — FIBROSIS 4 INDEX: FIB4 SCORE: 0.68

## 2024-08-22 NOTE — PROGRESS NOTES
"Subjective:     CC: Recurrent UTI, malaria prophylaxis, diabetes, hypertriglyceridemia, updating shots    HPI:   Shona presents today to discuss the following.    She will be traveling to [place] and needs malaria prophylaxis.  She also needs to get updated on some shots prior to traveling.    She has a history of recurrent UTIs especially with taking Jardiance and is hoping to have an antibiotic to take with her while she is traveling in case she gets symptoms.    Problem   Type 2 Diabetes Mellitus Without Complication, Without Long-Term Current Use of Insulin (Hcc)    Chronic condition. She is currently on Jardiance 10 mg daily and glimepiride 4 mg daily     Pure Hypertriglyceridemia    Chronic condition, previously declined statin, no recent labs    Lab Results   Component Value Date/Time    CHOLSTRLTOT 172 12/07/2023 08:16 AM    LDL 68 12/07/2023 08:16 AM    HDL 45 12/07/2023 08:16 AM    TRIGLYCERIDE 293 (H) 12/07/2023 08:16 AM       Lab Results   Component Value Date/Time    SODIUM 138 12/07/2023 08:16 AM    POTASSIUM 4.4 12/07/2023 08:16 AM    CHLORIDE 100 12/07/2023 08:16 AM    CO2 28 12/07/2023 08:16 AM    GLUCOSE 142 (H) 12/07/2023 08:16 AM    BUN 13 12/07/2023 08:16 AM    CREATININE 0.49 (L) 12/07/2023 08:16 AM    CREATININE 0.72 09/02/2011 08:50 AM    BUNCREATRAT 26 (H) 09/02/2011 08:50 AM    GLOMRATE >59 09/23/2010 08:37 AM     Lab Results   Component Value Date/Time    ALKPHOSPHAT 73 12/07/2023 08:16 AM    ASTSGOT 16 12/07/2023 08:16 AM    ALTSGPT 21 12/07/2023 08:16 AM    TBILIRUBIN 0.3 12/07/2023 08:16 AM           Hypothyroidism    Chronic condition, currently levothyroxine 75 mcg every morning,          ROS:  ROS    Objective:     Exam:  /66 (BP Location: Left arm, Patient Position: Sitting, BP Cuff Size: Adult)   Pulse 85   Temp 36.3 °C (97.3 °F) (Temporal)   Resp 16   Ht 1.6 m (5' 3\")   Wt 63.5 kg (140 lb)   LMP 08/20/2011   SpO2 100%   BMI 24.80 kg/m²  Body mass index is 24.8 " kg/m².    Physical Exam  Vitals reviewed.   Constitutional:       General: She is not in acute distress.     Appearance: She is not toxic-appearing.   HENT:      Head: Normocephalic and atraumatic.      Right Ear: External ear normal.      Left Ear: External ear normal.   Eyes:      General:         Right eye: No discharge.         Left eye: No discharge.      Extraocular Movements: Extraocular movements intact.      Conjunctiva/sclera: Conjunctivae normal.   Pulmonary:      Effort: Pulmonary effort is normal. No respiratory distress.   Skin:     General: Skin is warm and dry.   Neurological:      Mental Status: She is alert.   Psychiatric:         Mood and Affect: Mood normal.         Behavior: Behavior normal.         Thought Content: Thought content normal.         Judgment: Judgment normal.           Assessment & Plan:     64 y.o. female with the following -     1. Type 2 diabetes mellitus without complication, without long-term current use of insulin ([facility])  A1c today is 7.6.  Recheck labs in 3 months.  If this does not come down further we can add another medication.  I will see her in office to discuss this  - MICROALBUMIN CREAT RATIO URINE; Future  - POCT  A1C  - Lipid Profile; Future  - Comp Metabolic Panel; Future  - HEMOGLOBIN A1C; Future    2. Pure hypertriglyceridemia  Previously declined statin, recheck labs  - Lipid Profile; Future    3. Acquired hypothyroidism  Continue levothyroxine at current dosing, recheck labs  - TSH WITH REFLEX TO FT4; Future    4. Need for malaria prophylaxis  Discussed to start doxycycline once a day 1 to 2 days prior to travel and continuing through the duration of travel and for 4 weeks after  - doxycycline (VIBRAMYCIN) 100 MG Tab; Take 1 Tablet by mouth every day.  Dispense: 52 Tablet; Refill: 0    5. Recurrent UTI  Bactrim given in case she develops UTI symptoms while traveling  - sulfamethoxazole-trimethoprim (BACTRIM DS) 800-160 MG tablet; Take 1 Tablet by mouth 2 times  a day.  Dispense: 6 Tablet; Refill: 0    6. Postmenopausal  - DS-BONE DENSITY STUDY (DEXA); Future    7. Encounter for vitamin deficiency screening  - VITAMIN D,25 HYDROXY (DEFICIENCY); Future    8. Immunization due  - Pneumococcal Conjugate Vaccine 20-Valent (6 wks+)  - Tdap =>6yo IM        No follow-ups on file.    Please note that this dictation was created using voice recognition software. I have made every reasonable attempt to correct obvious errors, but I expect that there are errors of grammar and possibly content that I did not discover before finalizing the note.

## 2024-09-30 ENCOUNTER — APPOINTMENT (OUTPATIENT)
Dept: RADIOLOGY | Facility: MEDICAL CENTER | Age: 64
End: 2024-09-30
Attending: STUDENT IN AN ORGANIZED HEALTH CARE EDUCATION/TRAINING PROGRAM
Payer: COMMERCIAL

## 2024-10-25 DIAGNOSIS — E03.9 ACQUIRED HYPOTHYROIDISM: ICD-10-CM

## 2024-10-28 RX ORDER — LEVOTHYROXINE SODIUM 75 UG/1
75 TABLET ORAL
Qty: 90 TABLET | Refills: 0 | Status: SHIPPED | OUTPATIENT
Start: 2024-10-28 | End: 2024-10-30 | Stop reason: SDUPTHER

## 2024-10-30 DIAGNOSIS — E03.9 ACQUIRED HYPOTHYROIDISM: ICD-10-CM

## 2024-10-31 RX ORDER — LEVOTHYROXINE SODIUM 75 UG/1
75 TABLET ORAL
Qty: 90 TABLET | Refills: 0 | Status: SHIPPED | OUTPATIENT
Start: 2024-10-31

## 2024-11-04 ENCOUNTER — HOSPITAL ENCOUNTER (OUTPATIENT)
Dept: RADIOLOGY | Facility: MEDICAL CENTER | Age: 64
End: 2024-11-04
Attending: STUDENT IN AN ORGANIZED HEALTH CARE EDUCATION/TRAINING PROGRAM
Payer: COMMERCIAL

## 2024-11-04 DIAGNOSIS — Z78.0 POSTMENOPAUSAL: ICD-10-CM

## 2024-11-04 PROCEDURE — 77080 DXA BONE DENSITY AXIAL: CPT

## 2024-11-14 ENCOUNTER — HOSPITAL ENCOUNTER (OUTPATIENT)
Dept: RADIOLOGY | Facility: MEDICAL CENTER | Age: 64
End: 2024-11-14
Attending: STUDENT IN AN ORGANIZED HEALTH CARE EDUCATION/TRAINING PROGRAM
Payer: COMMERCIAL

## 2024-11-14 DIAGNOSIS — Z12.31 ENCOUNTER FOR SCREENING MAMMOGRAM FOR MALIGNANT NEOPLASM OF BREAST: ICD-10-CM

## 2024-11-14 PROCEDURE — 77067 SCR MAMMO BI INCL CAD: CPT

## 2024-12-17 ENCOUNTER — PATIENT MESSAGE (OUTPATIENT)
Dept: MEDICAL GROUP | Facility: MEDICAL CENTER | Age: 64
End: 2024-12-17
Payer: COMMERCIAL

## 2024-12-17 DIAGNOSIS — E11.9 TYPE 2 DIABETES MELLITUS WITHOUT COMPLICATION, WITHOUT LONG-TERM CURRENT USE OF INSULIN (HCC): ICD-10-CM

## 2025-01-02 DIAGNOSIS — E11.9 TYPE 2 DIABETES MELLITUS WITHOUT COMPLICATION, WITHOUT LONG-TERM CURRENT USE OF INSULIN (HCC): ICD-10-CM

## 2025-01-02 NOTE — TELEPHONE ENCOUNTER
Received request via: Pharmacy    Was the patient seen in the last year in this department? Yes    Does the patient have an active prescription (recently filled or refills available) for medication(s) requested? Yes. Requested to different pharmacy.    Pharmacy Name: [facility]    Does the patient have skilled nursing Plus and need 100-day supply? (This applies to ALL medications) Patient does not have SCP    Pharmacy is not enrolled in electronic prescribing. Pharmacy is requesting manually faxed prescription.

## 2025-01-06 ENCOUNTER — HOSPITAL ENCOUNTER (OUTPATIENT)
Dept: LAB | Facility: MEDICAL CENTER | Age: 65
End: 2025-01-06
Attending: STUDENT IN AN ORGANIZED HEALTH CARE EDUCATION/TRAINING PROGRAM
Payer: COMMERCIAL

## 2025-01-06 DIAGNOSIS — Z13.21 ENCOUNTER FOR VITAMIN DEFICIENCY SCREENING: ICD-10-CM

## 2025-01-06 DIAGNOSIS — E03.9 ACQUIRED HYPOTHYROIDISM: ICD-10-CM

## 2025-01-06 DIAGNOSIS — E11.9 TYPE 2 DIABETES MELLITUS WITHOUT COMPLICATION, WITHOUT LONG-TERM CURRENT USE OF INSULIN (HCC): ICD-10-CM

## 2025-01-06 DIAGNOSIS — E78.1 PURE HYPERTRIGLYCERIDEMIA: ICD-10-CM

## 2025-01-06 LAB
25(OH)D3 SERPL-MCNC: 47 NG/ML (ref 30–100)
ALBUMIN SERPL BCP-MCNC: 4.2 G/DL (ref 3.2–4.9)
ALBUMIN/GLOB SERPL: 1.4 G/DL
ALP SERPL-CCNC: 74 U/L (ref 30–99)
ALT SERPL-CCNC: 18 U/L (ref 2–50)
ANION GAP SERPL CALC-SCNC: 10 MMOL/L (ref 7–16)
AST SERPL-CCNC: 18 U/L (ref 12–45)
BILIRUB SERPL-MCNC: 0.3 MG/DL (ref 0.1–1.5)
BUN SERPL-MCNC: 11 MG/DL (ref 8–22)
CALCIUM ALBUM COR SERPL-MCNC: 9.1 MG/DL (ref 8.5–10.5)
CALCIUM SERPL-MCNC: 9.3 MG/DL (ref 8.4–10.2)
CHLORIDE SERPL-SCNC: 101 MMOL/L (ref 96–112)
CHOLEST SERPL-MCNC: 184 MG/DL (ref 100–199)
CO2 SERPL-SCNC: 25 MMOL/L (ref 20–33)
CREAT SERPL-MCNC: 0.59 MG/DL (ref 0.5–1.4)
EST. AVERAGE GLUCOSE BLD GHB EST-MCNC: 171 MG/DL
FASTING STATUS PATIENT QL REPORTED: NORMAL
GFR SERPLBLD CREATININE-BSD FMLA CKD-EPI: 100 ML/MIN/1.73 M 2
GLOBULIN SER CALC-MCNC: 3 G/DL (ref 1.9–3.5)
GLUCOSE SERPL-MCNC: 147 MG/DL (ref 65–99)
HBA1C MFR BLD: 7.6 % (ref 4–5.6)
HDLC SERPL-MCNC: 57 MG/DL
LDLC SERPL CALC-MCNC: 83 MG/DL
POTASSIUM SERPL-SCNC: 4.7 MMOL/L (ref 3.6–5.5)
PROT SERPL-MCNC: 7.2 G/DL (ref 6–8.2)
SODIUM SERPL-SCNC: 136 MMOL/L (ref 135–145)
TRIGL SERPL-MCNC: 222 MG/DL (ref 0–149)
TSH SERPL DL<=0.005 MIU/L-ACNC: 2.09 UIU/ML (ref 0.38–5.33)

## 2025-01-06 PROCEDURE — 82306 VITAMIN D 25 HYDROXY: CPT

## 2025-01-06 PROCEDURE — 84443 ASSAY THYROID STIM HORMONE: CPT

## 2025-01-06 PROCEDURE — 80053 COMPREHEN METABOLIC PANEL: CPT

## 2025-01-06 PROCEDURE — 36415 COLL VENOUS BLD VENIPUNCTURE: CPT

## 2025-01-06 PROCEDURE — 80061 LIPID PANEL: CPT

## 2025-01-06 PROCEDURE — 83036 HEMOGLOBIN GLYCOSYLATED A1C: CPT

## 2025-01-15 ENCOUNTER — OFFICE VISIT (OUTPATIENT)
Dept: MEDICAL GROUP | Facility: MEDICAL CENTER | Age: 65
End: 2025-01-15
Payer: COMMERCIAL

## 2025-01-15 VITALS
HEIGHT: 63 IN | DIASTOLIC BLOOD PRESSURE: 76 MMHG | HEART RATE: 79 BPM | OXYGEN SATURATION: 98 % | SYSTOLIC BLOOD PRESSURE: 130 MMHG | BODY MASS INDEX: 24.95 KG/M2 | TEMPERATURE: 97.7 F | WEIGHT: 140.8 LBS

## 2025-01-15 DIAGNOSIS — M17.0 PRIMARY OSTEOARTHRITIS OF BOTH KNEES: ICD-10-CM

## 2025-01-15 DIAGNOSIS — E11.9 TYPE 2 DIABETES MELLITUS WITHOUT COMPLICATION, WITHOUT LONG-TERM CURRENT USE OF INSULIN (HCC): ICD-10-CM

## 2025-01-15 DIAGNOSIS — Z13.0 SCREENING, ANEMIA, DEFICIENCY, IRON: ICD-10-CM

## 2025-01-15 DIAGNOSIS — K82.4 GALLBLADDER POLYP: ICD-10-CM

## 2025-01-15 DIAGNOSIS — E78.1 PURE HYPERTRIGLYCERIDEMIA: ICD-10-CM

## 2025-01-15 PROCEDURE — 3075F SYST BP GE 130 - 139MM HG: CPT | Performed by: STUDENT IN AN ORGANIZED HEALTH CARE EDUCATION/TRAINING PROGRAM

## 2025-01-15 PROCEDURE — 3078F DIAST BP <80 MM HG: CPT | Performed by: STUDENT IN AN ORGANIZED HEALTH CARE EDUCATION/TRAINING PROGRAM

## 2025-01-15 PROCEDURE — 99214 OFFICE O/P EST MOD 30 MIN: CPT | Performed by: STUDENT IN AN ORGANIZED HEALTH CARE EDUCATION/TRAINING PROGRAM

## 2025-01-15 ASSESSMENT — PATIENT HEALTH QUESTIONNAIRE - PHQ9: CLINICAL INTERPRETATION OF PHQ2 SCORE: 0

## 2025-01-15 ASSESSMENT — FIBROSIS 4 INDEX: FIB4 SCORE: 0.83

## 2025-01-15 NOTE — PROGRESS NOTES
"Subjective:     CC: Diabetes, hyperlipidemia, gallbladder polyp    HPI:   Anathasia presents today with    Problem   Gallbladder Polyp    Known gallbladder polyps, follows with general surgery who recommended cholecystectomy.  She has opted for monitoring.  Due for repeat imaging     Type 2 Diabetes Mellitus Without Complication, Without Long-Term Current Use of Insulin (Hcc)    Chronic condition.  A1c is 7.6.  She is currently on Jardiance 25 mg daily and glimepiride 4 mg daily     Pure Hypertriglyceridemia    Chronic condition, previously declined statin, no recent labs    Lab Results   Component Value Date/Time    CHOLSTRLTOT 184 01/06/2025 08:34 AM    LDL 83 01/06/2025 08:34 AM    HDL 57 01/06/2025 08:34 AM    TRIGLYCERIDE 222 (H) 01/06/2025 08:34 AM       Lab Results   Component Value Date/Time    SODIUM 136 01/06/2025 08:34 AM    POTASSIUM 4.7 01/06/2025 08:34 AM    CHLORIDE 101 01/06/2025 08:34 AM    CO2 25 01/06/2025 08:34 AM    GLUCOSE 147 (H) 01/06/2025 08:34 AM    BUN 11 01/06/2025 08:34 AM    CREATININE 0.59 01/06/2025 08:34 AM    CREATININE 0.72 09/02/2011 08:50 AM    BUNCREATRAT 26 (H) 09/02/2011 08:50 AM    GLOMRATE >59 09/23/2010 08:37 AM     Lab Results   Component Value Date/Time    ALKPHOSPHAT 74 01/06/2025 08:34 AM    ASTSGOT 18 01/06/2025 08:34 AM    ALTSGPT 18 01/06/2025 08:34 AM    TBILIRUBIN 0.3 01/06/2025 08:34 AM             ROS:  ROS    Objective:     Exam:  /76 (BP Location: Left arm, Patient Position: Sitting, BP Cuff Size: Adult)   Pulse 79   Temp 36.5 °C (97.7 °F) (Temporal)   Ht 1.6 m (5' 3\")   Wt 63.9 kg (140 lb 12.8 oz)   LMP 08/20/2011   SpO2 98%   BMI 24.94 kg/m²  Body mass index is 24.94 kg/m².    Physical Exam  Vitals reviewed.   Constitutional:       General: She is not in acute distress.     Appearance: She is not toxic-appearing.   HENT:      Head: Normocephalic and atraumatic.      Right Ear: External ear normal.      Left Ear: External ear normal.   Eyes:      " General:         Right eye: No discharge.         Left eye: No discharge.      Extraocular Movements: Extraocular movements intact.      Conjunctiva/sclera: Conjunctivae normal.   Cardiovascular:      Rate and Rhythm: Normal rate and regular rhythm.      Heart sounds: Murmur heard.   Pulmonary:      Effort: Pulmonary effort is normal. No respiratory distress.      Breath sounds: Normal breath sounds. No wheezing or rales.   Skin:     General: Skin is warm and dry.   Neurological:      Mental Status: She is alert.   Psychiatric:         Mood and Affect: Mood normal.         Behavior: Behavior normal.         Thought Content: Thought content normal.         Judgment: Judgment normal.           Assessment & Plan:     64 y.o. female with the following -     1. Type 2 diabetes mellitus without complication, without long-term current use of insulin ([facility])  A1c elevated at 7.6, recheck in 3 months.  If still elevated may adjust medications  - Empagliflozin 25 MG Tab; Take 25 mg by mouth every day.  Dispense: 90 Tablet; Refill: 3  - HEMOGLOBIN A1C; Future  - Lipid Profile; Future    2. Screening, anemia, deficiency, iron  - CBC WITH DIFFERENTIAL; Future    3. Primary osteoarthritis of both knees  - diclofenac sodium (VOLTAREN) 1 % Gel; APPLY 2-4 GRAMS  TOPICALLY TO AFFECTED AREA 4 TIMES DAILY  Dispense: 100 g; Refill: 5    4. Gallbladder polyp  Ultrasound ordered to recheck polyps  - US-RUQ; Future    5. Pure hypertriglyceridemia  Recheck labs      No follow-ups on file.    Please note that this dictation was created using voice recognition software. I have made every reasonable attempt to correct obvious errors, but I expect that there are errors of grammar and possibly content that I did not discover before finalizing the note.

## 2025-01-15 NOTE — LETTER
[facility]  Yovana Grey M.D.  [address]  [place] [address]  Fax: [phone]   Authorization for Release/Disclosure of   Protected Health Information   Name: ADELFO KAUFMAN : 1960 SSN: [id number]   Address: [address] [place] [address] [address] [place] [address] Phone:    [phone] (home) [phone] (work)   I authorize the entity listed below to release/disclose the PHI below to:   [facility]/Yovana Grey M.D. and Yovana Grey M.D.   Provider or Entity Name:  [facility]   Address   City, State, Zip   Phone:      Fax:     Reason for request: continuity of care   Information to be released:    [  ] LAST COLONOSCOPY,  including any PATH REPORT and follow-up  [  ] LAST FIT/COLOGUARD RESULT [  ] LAST DEXA  [  ] LAST MAMMOGRAM  [  ] LAST PAP  [  ] LAST LABS [ XX ] RETINA EXAM REPORT  [  ] IMMUNIZATION RECORDS  [  ] Release all info      [  ] Check here and initial the line next to each item to release ALL health information INCLUDING  _____ Care and treatment for drug and / or alcohol abuse  _____ HIV testing, infection status, or AIDS  _____ Genetic Testing    DATES OF SERVICE OR TIME PERIOD TO BE DISCLOSED: _____________  I understand and acknowledge that:  * This Authorization may be revoked at any time by you in writing, except if your health information has already been used or disclosed.  * Your health information that will be used or disclosed as a result of you signing this authorization could be re-disclosed by the recipient. If this occurs, your re-disclosed health information may no longer be protected by State or Federal laws.  * You may refuse to sign this Authorization. Your refusal will not affect your ability to obtain treatment.  * This Authorization becomes effective upon signing and will  on (date) __________.      If no date is indicated, this Authorization will  one (1) year from the signature date.    Name: Adelfo Wood  Radha  Signature: Date:   1/15/2025     PLEASE FAX REQUESTED RECORDS BACK TO: [phone]

## 2025-02-20 ENCOUNTER — RESULTS FOLLOW-UP (OUTPATIENT)
Dept: MEDICAL GROUP | Facility: MEDICAL CENTER | Age: 65
End: 2025-02-20

## 2025-02-20 ENCOUNTER — HOSPITAL ENCOUNTER (OUTPATIENT)
Dept: RADIOLOGY | Facility: MEDICAL CENTER | Age: 65
End: 2025-02-20
Attending: STUDENT IN AN ORGANIZED HEALTH CARE EDUCATION/TRAINING PROGRAM
Payer: COMMERCIAL

## 2025-02-20 DIAGNOSIS — K82.4 GALLBLADDER POLYP: ICD-10-CM

## 2025-02-20 PROCEDURE — 76705 ECHO EXAM OF ABDOMEN: CPT

## 2025-04-24 ENCOUNTER — HOSPITAL ENCOUNTER (OUTPATIENT)
Facility: MEDICAL CENTER | Age: 65
End: 2025-04-24
Attending: STUDENT IN AN ORGANIZED HEALTH CARE EDUCATION/TRAINING PROGRAM
Payer: COMMERCIAL

## 2025-04-24 DIAGNOSIS — E11.9 TYPE 2 DIABETES MELLITUS WITHOUT COMPLICATION, WITHOUT LONG-TERM CURRENT USE OF INSULIN (HCC): ICD-10-CM

## 2025-04-24 DIAGNOSIS — Z13.0 SCREENING, ANEMIA, DEFICIENCY, IRON: ICD-10-CM

## 2025-04-24 LAB
BASOPHILS # BLD AUTO: 0.6 % (ref 0–1.8)
BASOPHILS # BLD: 0.04 K/UL (ref 0–0.12)
CHOLEST SERPL-MCNC: 179 MG/DL (ref 100–199)
EOSINOPHIL # BLD AUTO: 0.15 K/UL (ref 0–0.51)
EOSINOPHIL NFR BLD: 2.3 % (ref 0–6.9)
ERYTHROCYTE [DISTWIDTH] IN BLOOD BY AUTOMATED COUNT: 40.9 FL (ref 35.9–50)
EST. AVERAGE GLUCOSE BLD GHB EST-MCNC: 200 MG/DL
HBA1C MFR BLD: 8.6 % (ref 4–5.6)
HCT VFR BLD AUTO: 42.5 % (ref 37–47)
HDLC SERPL-MCNC: 46 MG/DL
HGB BLD-MCNC: 13.9 G/DL (ref 12–16)
IMM GRANULOCYTES # BLD AUTO: 0.02 K/UL (ref 0–0.11)
IMM GRANULOCYTES NFR BLD AUTO: 0.3 % (ref 0–0.9)
LDLC SERPL CALC-MCNC: 75 MG/DL
LYMPHOCYTES # BLD AUTO: 2.52 K/UL (ref 1–4.8)
LYMPHOCYTES NFR BLD: 38.7 % (ref 22–41)
MCH RBC QN AUTO: 28.4 PG (ref 27–33)
MCHC RBC AUTO-ENTMCNC: 32.7 G/DL (ref 32.2–35.5)
MCV RBC AUTO: 86.9 FL (ref 81.4–97.8)
MONOCYTES # BLD AUTO: 0.39 K/UL (ref 0–0.85)
MONOCYTES NFR BLD AUTO: 6 % (ref 0–13.4)
NEUTROPHILS # BLD AUTO: 3.4 K/UL (ref 1.82–7.42)
NEUTROPHILS NFR BLD: 52.1 % (ref 44–72)
NRBC # BLD AUTO: 0 K/UL
NRBC BLD-RTO: 0 /100 WBC (ref 0–0.2)
PLATELET # BLD AUTO: 382 K/UL (ref 164–446)
PMV BLD AUTO: 9.2 FL (ref 9–12.9)
RBC # BLD AUTO: 4.89 M/UL (ref 4.2–5.4)
TRIGL SERPL-MCNC: 289 MG/DL (ref 0–149)
WBC # BLD AUTO: 6.5 K/UL (ref 4.8–10.8)

## 2025-04-24 PROCEDURE — 80061 LIPID PANEL: CPT

## 2025-04-24 PROCEDURE — 83036 HEMOGLOBIN GLYCOSYLATED A1C: CPT

## 2025-04-24 PROCEDURE — 36415 COLL VENOUS BLD VENIPUNCTURE: CPT

## 2025-04-24 PROCEDURE — 85025 COMPLETE CBC W/AUTO DIFF WBC: CPT

## 2025-04-25 ENCOUNTER — RESULTS FOLLOW-UP (OUTPATIENT)
Dept: MEDICAL GROUP | Facility: MEDICAL CENTER | Age: 65
End: 2025-04-25
Payer: COMMERCIAL

## 2025-04-30 ENCOUNTER — OFFICE VISIT (OUTPATIENT)
Dept: MEDICAL GROUP | Facility: MEDICAL CENTER | Age: 65
End: 2025-04-30
Payer: COMMERCIAL

## 2025-04-30 VITALS
TEMPERATURE: 97 F | WEIGHT: 139 LBS | HEIGHT: 63 IN | SYSTOLIC BLOOD PRESSURE: 120 MMHG | BODY MASS INDEX: 24.63 KG/M2 | OXYGEN SATURATION: 100 % | HEART RATE: 74 BPM | DIASTOLIC BLOOD PRESSURE: 60 MMHG

## 2025-04-30 DIAGNOSIS — E03.9 ACQUIRED HYPOTHYROIDISM: ICD-10-CM

## 2025-04-30 DIAGNOSIS — R00.2 PALPITATIONS: ICD-10-CM

## 2025-04-30 DIAGNOSIS — E11.9 TYPE 2 DIABETES MELLITUS WITHOUT COMPLICATION, WITHOUT LONG-TERM CURRENT USE OF INSULIN (HCC): ICD-10-CM

## 2025-04-30 RX ORDER — LEVOTHYROXINE SODIUM 75 UG/1
75 TABLET ORAL
Qty: 90 TABLET | Refills: 3 | Status: SHIPPED | OUTPATIENT
Start: 2025-04-30

## 2025-04-30 ASSESSMENT — FIBROSIS 4 INDEX: FIB4 SCORE: 0.72

## 2025-04-30 NOTE — PROGRESS NOTES
"Subjective:     CC: Diabetes, palpitations    HPI:   Shona presents today with palpitations.  She says she gets these occasionally and notices them more when she is under stress.  She does not drink alcohol.  She has had some increased caffeine intake.  She drinks a little coffee and does drink quite a bit of tea.    Problem   Type 2 Diabetes Mellitus Without Complication, Without Long-Term Current Use of Insulin ([facility])    Chronic condition.  Uncontrolled with an A1c of 8.6. She is currently on Jardiance 25 mg daily and glimepiride 4 mg daily       ROS:  ROS    Objective:     Exam:  /60   Pulse 74   Temp 36.1 °C (97 °F) (Temporal)   Ht 1.6 m (5' 3\")   Wt 63 kg (139 lb)   LMP 08/20/2011   SpO2 100%   BMI 24.62 kg/m²  Body mass index is 24.62 kg/m².    Physical Exam  Vitals reviewed.   Constitutional:       General: She is not in acute distress.     Appearance: She is not toxic-appearing.   HENT:      Head: Normocephalic and atraumatic.      Right Ear: External ear normal.      Left Ear: External ear normal.   Eyes:      General:         Right eye: No discharge.         Left eye: No discharge.      Extraocular Movements: Extraocular movements intact.      Conjunctiva/sclera: Conjunctivae normal.   Pulmonary:      Effort: Pulmonary effort is normal. No respiratory distress.   Feet:      Right foot:      Protective Sensation: 3 sites tested.  3 sites sensed.      Left foot:      Protective Sensation: 3 sites tested.  3 sites sensed.   Skin:     General: Skin is warm and dry.   Neurological:      Mental Status: She is alert.   Psychiatric:         Mood and Affect: Mood normal.         Behavior: Behavior normal.         Thought Content: Thought content normal.         Judgment: Judgment normal.           Assessment & Plan:     64 y.o. female with the following -     1. Palpitations  We discussed getting a Zio patch.  She would like to wait till the next appointment and see if with decreasing caffeine use " these palpitations resolved.  She is going to reach out if they worsen or fail to improve.    2. Type 2 diabetes mellitus without complication, without long-term current use of insulin (HCC)  Patient would like to get an appointment with nutrition services prior to changing medication.  I have sent that referral.  We will recheck the A1c in office at the next visit and will adjust medications if it is not significantly improved  - Referral to Nutrition Services  - Diabetic Monofilament LE Exam    3. Acquired hypothyroidism  Chronic condition, stable, medication refill sent  - levothyroxine (SYNTHROID) 75 MCG Tab; Take 1 Tablet by mouth every morning on an empty stomach.  Dispense: 90 Tablet; Refill: 3        No follow-ups on file.    Please note that this dictation was created using voice recognition software. I have made every reasonable attempt to correct obvious errors, but I expect that there are errors of grammar and possibly content that I did not discover before finalizing the note.

## 2025-05-05 ENCOUNTER — PATIENT MESSAGE (OUTPATIENT)
Dept: MEDICAL GROUP | Facility: MEDICAL CENTER | Age: 65
End: 2025-05-05
Payer: COMMERCIAL

## 2025-05-05 DIAGNOSIS — E11.9 TYPE 2 DIABETES MELLITUS WITHOUT COMPLICATION, WITHOUT LONG-TERM CURRENT USE OF INSULIN (HCC): ICD-10-CM

## 2025-05-05 DIAGNOSIS — M25.569 KNEE PAIN, UNSPECIFIED CHRONICITY, UNSPECIFIED LATERALITY: ICD-10-CM

## 2025-05-05 RX ORDER — GLIMEPIRIDE 4 MG/1
4 TABLET ORAL EVERY MORNING
Qty: 90 TABLET | Refills: 3 | Status: SHIPPED | OUTPATIENT
Start: 2025-05-05

## 2025-05-05 NOTE — TELEPHONE ENCOUNTER
Received request via: Pharmacy    Was the patient seen in the last year in this department? Yes    Does the patient have an active prescription (recently filled or refills available) for medication(s) requested? No    Pharmacy Name: [id number] - [place], [place] - [address]     Does the patient have penitentiary Plus and need 100-day supply? (This applies to ALL medications) Patient does not have SCP

## 2025-05-06 RX ORDER — MELOXICAM 7.5 MG/1
7.5 TABLET ORAL DAILY
Qty: 30 TABLET | Refills: 0 | Status: SHIPPED | OUTPATIENT
Start: 2025-05-06

## 2025-05-06 NOTE — Clinical Note
REFERRAL APPROVAL NOTICE         Sent on May 6, 2025                   Shona Garcia  [address] [place] [address]                   Dear Ms. Garcia,    After a careful review of the medical information and benefit coverage, [facility] has processed your referral. See below for additional details.    If applicable, you must be actively enrolled with your insurance for coverage of the authorized service. If you have any questions regarding your coverage, please contact your insurance directly.    REFERRAL INFORMATION   Referral #:  [id number]  Referred-To Department    Referred-By Provider:  Juan Manuel Grey M.D.   [facility]      [address]  [address]  [facility] [id number] [phone] [address] [facility] [address] [phone]    Referral Start Date:  04/30/2025  Referral End Date:   04/30/2026             SCHEDULING  If you do not already have an appointment, please call [phone] to make an appointment.     MORE INFORMATION  If you do not already have a [employer] account, sign up at: [email or web address]  You can access your medical information, make appointments, see lab results, billing information, and more.  If you have questions regarding this referral, please contact  the [facility] Referrals department at:             [phone]. Monday - Friday 8:00AM - 5:00PM.     Sincerely,    [facility]

## 2025-08-11 ENCOUNTER — APPOINTMENT (OUTPATIENT)
Dept: MEDICAL GROUP | Facility: MEDICAL CENTER | Age: 65
End: 2025-08-11
Payer: COMMERCIAL

## 2025-08-12 ENCOUNTER — APPOINTMENT (OUTPATIENT)
Dept: MEDICAL GROUP | Facility: MEDICAL CENTER | Age: 65
End: 2025-08-12
Payer: COMMERCIAL

## 2025-08-12 ENCOUNTER — HOSPITAL ENCOUNTER (OUTPATIENT)
Facility: MEDICAL CENTER | Age: 65
End: 2025-08-12
Attending: STUDENT IN AN ORGANIZED HEALTH CARE EDUCATION/TRAINING PROGRAM
Payer: COMMERCIAL

## 2025-08-12 ENCOUNTER — PATIENT MESSAGE (OUTPATIENT)
Dept: MEDICAL GROUP | Facility: MEDICAL CENTER | Age: 65
End: 2025-08-12

## 2025-08-12 DIAGNOSIS — E11.9 TYPE 2 DIABETES MELLITUS WITHOUT COMPLICATION, WITHOUT LONG-TERM CURRENT USE OF INSULIN (HCC): ICD-10-CM

## 2025-08-12 PROBLEM — M25.562 LEFT KNEE PAIN: Status: ACTIVE | Noted: 2025-08-12

## 2025-08-12 LAB
CREAT UR-MCNC: 12.5 MG/DL
MICROALBUMIN UR-MCNC: <1.2 MG/DL
MICROALBUMIN/CREAT UR: NORMAL MG/G (ref 0–30)

## 2025-08-12 PROCEDURE — 82570 ASSAY OF URINE CREATININE: CPT

## 2025-08-12 PROCEDURE — 82043 UR ALBUMIN QUANTITATIVE: CPT

## 2025-08-12 ASSESSMENT — FIBROSIS 4 INDEX: FIB4 SCORE: 0.73

## 2025-08-14 ENCOUNTER — RESULTS FOLLOW-UP (OUTPATIENT)
Dept: MEDICAL GROUP | Facility: MEDICAL CENTER | Age: 65
End: 2025-08-14
Payer: COMMERCIAL